# Patient Record
Sex: MALE | Race: WHITE | Employment: UNEMPLOYED | URBAN - NONMETROPOLITAN AREA
[De-identification: names, ages, dates, MRNs, and addresses within clinical notes are randomized per-mention and may not be internally consistent; named-entity substitution may affect disease eponyms.]

---

## 2017-01-19 ENCOUNTER — HOSPITAL ENCOUNTER (EMERGENCY)
Facility: HOSPITAL | Age: 42
Discharge: HOME | End: 2017-01-19
Attending: NURSE PRACTITIONER | Admitting: NURSE PRACTITIONER
Payer: COMMERCIAL

## 2017-01-19 VITALS
TEMPERATURE: 99.9 F | SYSTOLIC BLOOD PRESSURE: 141 MMHG | OXYGEN SATURATION: 96 % | DIASTOLIC BLOOD PRESSURE: 99 MMHG | RESPIRATION RATE: 18 BRPM

## 2017-01-19 DIAGNOSIS — J11.1 INFLUENZA-LIKE ILLNESS: ICD-10-CM

## 2017-01-19 LAB
FLUAV+FLUBV AG SPEC QL: NEGATIVE
FLUAV+FLUBV AG SPEC QL: NORMAL
SPECIMEN SOURCE: NORMAL

## 2017-01-19 PROCEDURE — 99213 OFFICE O/P EST LOW 20 MIN: CPT | Mod: 25

## 2017-01-19 PROCEDURE — 99213 OFFICE O/P EST LOW 20 MIN: CPT | Performed by: NURSE PRACTITIONER

## 2017-01-19 PROCEDURE — 87804 INFLUENZA ASSAY W/OPTIC: CPT | Performed by: FAMILY MEDICINE

## 2017-01-19 PROCEDURE — 71020 ZZHC CHEST TWO VIEWS, FRONT/LAT: CPT | Mod: TC

## 2017-01-19 RX ORDER — OSELTAMIVIR PHOSPHATE 75 MG/1
75 CAPSULE ORAL 2 TIMES DAILY
Qty: 10 CAPSULE | Refills: 0 | Status: SHIPPED | OUTPATIENT
Start: 2017-01-19 | End: 2017-01-24

## 2017-01-19 ASSESSMENT — ENCOUNTER SYMPTOMS
SORE THROAT: 1
DYSURIA: 0
FEVER: 1
SINUS PRESSURE: 1
ACTIVITY CHANGE: 0
DIZZINESS: 1
SHORTNESS OF BREATH: 1
VOMITING: 0
TROUBLE SWALLOWING: 0
CHILLS: 1
APPETITE CHANGE: 1
FATIGUE: 1
RHINORRHEA: 1
NAUSEA: 0
PSYCHIATRIC NEGATIVE: 1
COUGH: 1

## 2017-01-19 NOTE — ED AVS SNAPSHOT
[facility]    [address]    [place] [address]    Phone:  [phone]                                       Jay Jay Chatman   MRN: [id number]    Department:  [facility]   Date of Visit:  1/19/2017           Patient Information     Date Of Birth          1975        Your diagnoses for this visit were:     Influenza-like illness        You were seen by Jennifer Ortega NP.      Follow-up Information     Follow up with Leno Pathak MD.    Specialty:  Family Practice    Why:  As needed, If symptoms worsen    Contact information:    [facility]  [address]  [place] [address] [phone]          Follow up with [facility].    Specialty:  EMERGENCY MEDICINE    Why:  As needed, If symptoms worsen    Contact information:    [address]  [place] [address] [phone]    Additional information:    From [facility]: Take [address]. Turn left at [address]. Turn left at the first stoplight on [address]. At the first stop sign, take a right onto [address]. Take a left into the parking lot and continue through until you reach the North enterance of the building.       From [place]: Take [address]. Take the [address] ramp towards [place]. Turn left onto [address]. Take a slight right onto [address]. Turn left at the first stoplight on [address]. At the first stop sign, take a right onto [address]. Take a left into the parking lot and continue through until you reach the North enterance of the building.       From [place]: Take [address]. Take a right at [address]. At the first stop sign, take a right onto [address]. Take a left into the parking lot and continue through until you reach the North enterance of the building.         Discharge Instructions       As we discussed your symptoms are consistent with influenza.   Take Tamiflu as  directed.  Take tylenol and or ibuprofen for fever or discomfort.   Rest.   Increase fluid intake.   Good hand washing.   Work note.  Follow up with PCP with an increase in symptoms or concerns.   Return to urgent care or emergency department with any increase in symptoms or concerns.     Discharge References/Attachments     INFLUENZA  (ENGLISH)         Review of your medicines      START taking        Dose / Directions Last dose taken    oseltamivir 75 MG capsule   Commonly known as:  TAMIFLU   Dose:  75 mg   Quantity:  10 capsule        Take 1 capsule (75 mg) by mouth 2 times daily for 5 days   Refills:  0          Our records show that you are taking the medicines listed below. If these are incorrect, please call your family doctor or clinic.        Dose / Directions Last dose taken    amoxicillin 875 MG tablet   Commonly known as:  AMOXIL   Dose:  875 mg   Quantity:  20 tablet        Take 1 tablet (875 mg) by mouth 2 times daily   Refills:  0        mometasone 50 MCG/ACT spray   Commonly known as:  NASONEX   Quantity:  17 g        1 spray to each nostril BID for 5 days   Refills:  0        ZYRTEC PO   Dose:  1 tablet        Take 1 tablet by mouth daily as needed 10mg Tab   Refills:  0                Prescriptions were sent or printed at these locations (1 Prescription)                   [facility]  [address]   [address] [place] [address]    Telephone:  [phone]   Fax:  [phone]   Hours:                  E-Prescribed (1 of 1)         oseltamivir (TAMIFLU) 75 MG capsule                Procedures and tests performed during your visit     Chest XR,  PA & LAT    Influenza A/B antigen      Orders Needing Specimen Collection     None      Pending Results     Date and Time Order Name Status Description    1/19/2017 1941 Chest XR,  PA & LAT In process             Pending Culture Results     No orders found from 1/18/2017 to 1/20/2017.            Thank you for choosing  [facility]       Thank you for choosing [facility] for your care. Our goal is always to provide you with excellent care. Hearing back from our patients is one way we can continue to improve our services. Please take a few minutes to complete the written survey that you may receive in the mail after you visit with us. Thank you!        [facility] Information     [facility] gives you secure access to your electronic health record. If you see a primary care provider, you can also send messages to your care team and make appointments. If you have questions, please call your primary care clinic.  If you do not have a primary care provider, please call [phone] and they will assist you.        Care EveryWhere ID     This is your Care EveryWhere ID. This could be used by other organizations to access your [facility] medical records  [id number]        After Visit Summary       This is your record. Keep this with you and show to your community pharmacist(s) and doctor(s) at your next visit.

## 2017-01-19 NOTE — LETTER
[facility]  [address] [address]  Phone: [phone]    January 19, 2017        Jay Jay Chatman  [address] [place] [address]          To whom it may concern:    RE: Jay Jay Chatman    Patient was seen and treated today at our clinic.    Please excuse from work on 1- & 1- for illness. Able to return to work on 1-.      Sincerely,        ISAIAS Brweer  7:43 PM  January 19, 2017

## 2017-01-19 NOTE — ED AVS SNAPSHOT
[facility]    [address] [place] [address]    Phone:  [phone]                                       Jay Jay Chatman   MRN: [id number]    Department:  [facility]   Date of Visit:  1/19/2017           After Visit Summary Signature Page     I have received my discharge instructions, and my questions have been answered. I have discussed any challenges I see with this plan with the nurse or doctor.    ..........................................................................................................................................  Patient/Patient Representative Signature      ..........................................................................................................................................  Patient Representative Print Name and Relationship to Patient    ..................................................               ................................................  Date                                            Time    ..........................................................................................................................................  Reviewed by Signature/Title    ...................................................              ..............................................  Date                                                            Time

## 2017-01-20 NOTE — DISCHARGE INSTRUCTIONS
As we discussed your symptoms are consistent with influenza.   Take Tamiflu as directed.  Take tylenol and or ibuprofen for fever or discomfort.   Rest.   Increase fluid intake.   Good hand washing.   Work note.  Follow up with PCP with an increase in symptoms or concerns.   Return to urgent care or emergency department with any increase in symptoms or concerns.

## 2017-01-20 NOTE — ED PROVIDER NOTES
History     Chief Complaint   Patient presents with     Cough     Fever     The history is provided by the patient. No  was used.     Jay Jay Chatman is a 41 year old male who presents with a cough, chills, and fever that started yesterday. He has taken aleve with mild relief. Positive for fever, chills, or night sweats. Drinking well. Decreased appetite. Bowel and bladder are working well. He did not receive an influenza vaccine this year.     I have reviewed the Medications, Allergies, Past Medical and Surgical History, and Social History in the Epic system.    Review of Systems   Constitutional: Positive for fever, chills, appetite change and fatigue. Negative for activity change.   HENT: Positive for congestion, ear pain, postnasal drip, rhinorrhea, sinus pressure and sore throat. Negative for ear discharge and trouble swallowing.    Respiratory: Positive for cough and shortness of breath.         SOB with cough.    Gastrointestinal: Negative for nausea and vomiting.   Genitourinary: Negative for dysuria.   Skin: Negative for rash.   Neurological: Positive for dizziness.   Psychiatric/Behavioral: Negative.        Physical Exam   BP: 141/99 mmHg  Heart Rate: 100  Temp: 99.9  F (37.7  C)  Resp: 18  SpO2: 96 %  Physical Exam   Constitutional: He is oriented to person, place, and time. He appears well-developed and well-nourished. No distress.   HENT:   Right Ear: External ear normal.   Left Ear: External ear normal.   Mouth/Throat: Oropharynx is clear and moist. No oropharyngeal exudate.   Eyes: Conjunctivae and EOM are normal. Pupils are equal, round, and reactive to light. Right eye exhibits no discharge. Left eye exhibits no discharge.   Neck: Normal range of motion. Neck supple.   Cardiovascular: Normal rate, regular rhythm, normal heart sounds and intact distal pulses.    Pulmonary/Chest: Effort normal. No respiratory distress. He has no wheezes. He has no rales.   Abdominal: Soft. He  exhibits no distension.   Musculoskeletal: Normal range of motion.   Lymphadenopathy:     He has no cervical adenopathy.   Neurological: He is alert and oriented to person, place, and time. No cranial nerve deficit.   Skin: Skin is warm and dry. No rash noted. He is not diaphoretic.   Psychiatric: He has a normal mood and affect. His behavior is normal.   Nursing note and vitals reviewed.      ED Course   Procedures    Labs Ordered and Resulted from Time of ED Arrival Up to the Time of Departure from the ED   INFLUENZA A/B ANTIGEN     Results for orders placed or performed during the hospital encounter of 01/19/17   Chest XR,  PA & LAT    Narrative    CHEST TWO VIEWS    FINDINGS:  Lungs are clear.  Heart and pulmonary vessels are within  normal limits.    IMPRESSION:  NO EVIDENCE OF ACTIVE CARDIOPULMONARY DISEASE.  Exam Date: Jan 19, 2017 07:53:50 PM  Author: BRENDA SANDY  This report is final and signed     Influenza A/B antigen   Result Value Ref Range    Influenza A/B Agn Specimen Nares     Influenza A Negative NEG    Influenza B  NEG     Negative   Test results must be correlated with clinical data. If necessary, results   should be confirmed by a molecular assay or viral culture.         Assessments & Plan (with Medical Decision Making)     His symptoms are consistent with an influenza illness. Influenza swab was negative, but will treat with Tamiflu given symptoms. He verbalized understanding to plan of care. He will follow up with any increase in symptoms or concerns. He verbalized understanding.     I have reviewed the nursing notes.    I have reviewed the findings, diagnosis, plan and need for follow up with the patient.  Discharged in stable condition.     New Prescriptions    OSELTAMIVIR (TAMIFLU) 75 MG CAPSULE    Take 1 capsule (75 mg) by mouth 2 times daily for 5 days       Final diagnoses:   Influenza-like illness     As we discussed your symptoms are consistent with influenza.   Take Tamiflu as  directed.  Take tylenol and or ibuprofen for fever or discomfort.   Rest.   Increase fluid intake.   Good hand washing.   Work note.  Follow up with PCP with an increase in symptoms or concerns.   Return to urgent care or emergency department with any increase in symptoms or concerns.     ISAIAS Brewer  7:23 PM  January 19, 2017        Jennifer Ortega NP  01/22/17 1558

## 2020-02-13 ENCOUNTER — HOSPITAL ENCOUNTER (EMERGENCY)
Facility: HOSPITAL | Age: 45
Discharge: HOME | End: 2020-02-13
Attending: EMERGENCY MEDICINE | Admitting: EMERGENCY MEDICINE
Payer: COMMERCIAL

## 2020-02-13 ENCOUNTER — APPOINTMENT (OUTPATIENT)
Dept: GENERAL RADIOLOGY | Facility: HOSPITAL | Age: 45
End: 2020-02-13
Attending: EMERGENCY MEDICINE
Payer: COMMERCIAL

## 2020-02-13 VITALS
DIASTOLIC BLOOD PRESSURE: 100 MMHG | TEMPERATURE: 97.5 F | RESPIRATION RATE: 17 BRPM | SYSTOLIC BLOOD PRESSURE: 153 MMHG | OXYGEN SATURATION: 96 % | HEART RATE: 68 BPM

## 2020-02-13 DIAGNOSIS — R07.9 CHEST PAIN, UNSPECIFIED TYPE: ICD-10-CM

## 2020-02-13 LAB
ALBUMIN SERPL-MCNC: 3.9 G/DL (ref 3.4–5)
ALBUMIN UR-MCNC: NEGATIVE MG/DL
ALP SERPL-CCNC: 88 U/L (ref 40–150)
ALT SERPL W P-5'-P-CCNC: 49 U/L (ref 0–70)
ANION GAP SERPL CALCULATED.3IONS-SCNC: 7 MMOL/L (ref 3–14)
APPEARANCE UR: CLEAR
AST SERPL W P-5'-P-CCNC: 18 U/L (ref 0–45)
BASOPHILS # BLD AUTO: 0 10E9/L (ref 0–0.2)
BASOPHILS NFR BLD AUTO: 0.1 %
BILIRUB DIRECT SERPL-MCNC: <0.1 MG/DL (ref 0–0.2)
BILIRUB SERPL-MCNC: 0.4 MG/DL (ref 0.2–1.3)
BILIRUB UR QL STRIP: NEGATIVE
BUN SERPL-MCNC: 11 MG/DL (ref 7–30)
CALCIUM SERPL-MCNC: 8.8 MG/DL (ref 8.5–10.1)
CHLORIDE SERPL-SCNC: 104 MMOL/L (ref 94–109)
CO2 SERPL-SCNC: 27 MMOL/L (ref 20–32)
COLOR UR AUTO: NORMAL
CREAT SERPL-MCNC: 0.97 MG/DL (ref 0.66–1.25)
D DIMER PPP FEU-MCNC: <0.3 UG/ML FEU (ref 0–0.5)
DIFFERENTIAL METHOD BLD: NORMAL
EOSINOPHIL # BLD AUTO: 0.1 10E9/L (ref 0–0.7)
EOSINOPHIL NFR BLD AUTO: 1.4 %
ERYTHROCYTE [DISTWIDTH] IN BLOOD BY AUTOMATED COUNT: 12.5 % (ref 10–15)
GFR SERPL CREATININE-BSD FRML MDRD: >90 ML/MIN/{1.73_M2}
GLUCOSE SERPL-MCNC: 108 MG/DL (ref 70–99)
GLUCOSE UR STRIP-MCNC: NEGATIVE MG/DL
HCT VFR BLD AUTO: 47.1 % (ref 40–53)
HGB BLD-MCNC: 16 G/DL (ref 13.3–17.7)
HGB UR QL STRIP: NEGATIVE
IMM GRANULOCYTES # BLD: 0 10E9/L (ref 0–0.4)
IMM GRANULOCYTES NFR BLD: 0.3 %
KETONES UR STRIP-MCNC: NEGATIVE MG/DL
LACTATE BLD-SCNC: 1.3 MMOL/L (ref 0.7–2)
LEUKOCYTE ESTERASE UR QL STRIP: NEGATIVE
LIPASE SERPL-CCNC: 62 U/L (ref 73–393)
LYMPHOCYTES # BLD AUTO: 2.2 10E9/L (ref 0.8–5.3)
LYMPHOCYTES NFR BLD AUTO: 30.7 %
MCH RBC QN AUTO: 28.3 PG (ref 26.5–33)
MCHC RBC AUTO-ENTMCNC: 34 G/DL (ref 31.5–36.5)
MCV RBC AUTO: 83 FL (ref 78–100)
MONOCYTES # BLD AUTO: 0.5 10E9/L (ref 0–1.3)
MONOCYTES NFR BLD AUTO: 6.8 %
NEUTROPHILS # BLD AUTO: 4.4 10E9/L (ref 1.6–8.3)
NEUTROPHILS NFR BLD AUTO: 60.7 %
NITRATE UR QL: NEGATIVE
NRBC # BLD AUTO: 0 10*3/UL
NRBC BLD AUTO-RTO: 0 /100
PH UR STRIP: 6.5 PH (ref 4.7–8)
PLATELET # BLD AUTO: 227 10E9/L (ref 150–450)
POTASSIUM SERPL-SCNC: 3.4 MMOL/L (ref 3.4–5.3)
PROT SERPL-MCNC: 7.5 G/DL (ref 6.8–8.8)
RBC # BLD AUTO: 5.65 10E12/L (ref 4.4–5.9)
SODIUM SERPL-SCNC: 138 MMOL/L (ref 133–144)
SOURCE: NORMAL
SP GR UR STRIP: 1.02 (ref 1–1.03)
TROPONIN I SERPL-MCNC: <0.015 UG/L (ref 0–0.04)
UROBILINOGEN UR STRIP-MCNC: NORMAL MG/DL (ref 0–2)
WBC # BLD AUTO: 7.2 10E9/L (ref 4–11)

## 2020-02-13 PROCEDURE — 93010 ELECTROCARDIOGRAM REPORT: CPT | Performed by: INTERNAL MEDICINE

## 2020-02-13 PROCEDURE — 85379 FIBRIN DEGRADATION QUANT: CPT | Performed by: EMERGENCY MEDICINE

## 2020-02-13 PROCEDURE — 99285 EMERGENCY DEPT VISIT HI MDM: CPT | Mod: Z6 | Performed by: EMERGENCY MEDICINE

## 2020-02-13 PROCEDURE — 93005 ELECTROCARDIOGRAM TRACING: CPT

## 2020-02-13 PROCEDURE — 83605 ASSAY OF LACTIC ACID: CPT | Performed by: EMERGENCY MEDICINE

## 2020-02-13 PROCEDURE — 83690 ASSAY OF LIPASE: CPT | Performed by: EMERGENCY MEDICINE

## 2020-02-13 PROCEDURE — 71046 X-RAY EXAM CHEST 2 VIEWS: CPT | Mod: TC

## 2020-02-13 PROCEDURE — 84484 ASSAY OF TROPONIN QUANT: CPT | Performed by: EMERGENCY MEDICINE

## 2020-02-13 PROCEDURE — 36415 COLL VENOUS BLD VENIPUNCTURE: CPT | Performed by: EMERGENCY MEDICINE

## 2020-02-13 PROCEDURE — 81003 URINALYSIS AUTO W/O SCOPE: CPT | Performed by: EMERGENCY MEDICINE

## 2020-02-13 PROCEDURE — 85025 COMPLETE CBC W/AUTO DIFF WBC: CPT | Performed by: EMERGENCY MEDICINE

## 2020-02-13 PROCEDURE — 80048 BASIC METABOLIC PNL TOTAL CA: CPT | Performed by: EMERGENCY MEDICINE

## 2020-02-13 PROCEDURE — 80076 HEPATIC FUNCTION PANEL: CPT | Performed by: EMERGENCY MEDICINE

## 2020-02-13 PROCEDURE — 99285 EMERGENCY DEPT VISIT HI MDM: CPT | Mod: 25

## 2020-02-13 NOTE — DISCHARGE INSTRUCTIONS
Follow-up for stress test and ZIO Patch placement.  Thereafter follow-up with your primary doctor.  Return if any new or concerning symptoms.

## 2020-02-13 NOTE — ED AVS SNAPSHOT
[facility]  [address] [address]  Phone:  [phone]                                    Jay Jay Chatman   MRN: [id number]    Department:  [facility]   Date of Visit:  2/13/2020           After Visit Summary Signature Page    I have received my discharge instructions, and my questions have been answered. I have discussed any challenges I see with this plan with the nurse or doctor.    ..........................................................................................................................................  Patient/Patient Representative Signature      ..........................................................................................................................................  Patient Representative Print Name and Relationship to Patient    ..................................................               ................................................  Date                                   Time    ..........................................................................................................................................  Reviewed by Signature/Title    ...................................................              ..............................................  Date                                               Time          22EPIC Rev 08/18

## 2020-02-13 NOTE — ED PROVIDER NOTES
History     Chief Complaint   Patient presents with     Chest Pain     HPI  Jay Jay Chatman is a 44 year old male who presents with sharp pain in left chest, short lasting, seconds then gone, sometimes 2-3 at a time, occurred three times.  Sharp stabbing, not pressure.  No hx of similar.  No sob.  Mild nausea.  No vomiting.  No radiation.  No abd pain.  Stress test several years ago normal.  Pain at that time was different.  No abd pain.  No pain right now.  No caffeine or coffee.- only water and gatorade.  No medical problems otherwise.      Allergies:  No Known Allergies    Problem List:    Patient Active Problem List    Diagnosis Date Noted     ACP (advance care planning) 05/11/2016     Priority: Medium     Advance Care Planning 5/11/2016: ACP Review of Chart / Resources Provided:  Reviewed chart for advance care plan.  Jay Jay Chatman has no plan or code status on file. Discussed available resources and provided with information. Confirmed code status reflects current choices pending further ACP discussions.  Confirmed/documented legally designated decision makers.  Added by Ivone Jim             Low back pain, left L4-L5 radiculopathy 02/24/2016     Priority: Medium     Seasonal allergic rhinitis 08/25/2015     Priority: Medium        Past Medical History:    Past Medical History:   Diagnosis Date     Acute sinusitis, unspecified 10/09/2006       Past Surgical History:    Past Surgical History:   Procedure Laterality Date     ADENOIDECTOMY  04/2006     FESS  02/2010     TONSILLECTOMY  04/2006     ventilation tubes, bilateral x 2  04/2006       Family History:    Family History   Problem Relation Age of Onset     Diabetes Mother      Cerebrovascular Disease Mother      Heart Disease Mother         stents     Diabetes Father      Other - See Comments Brother 4        myocardial infarction     Heart Disease Brother         MI     Other - See Comments Sister         kidney problems; status post nephrectomy        Social History:  Marital Status:   [2]  Social History     Tobacco Use     Smoking status: Never Smoker     Smokeless tobacco: Never Used     Tobacco comment: no passive exposure   Substance Use Topics     Alcohol use: No     Drug use: No        Medications:    Cetirizine HCl (ZYRTEC PO)          Review of Systems   resp denies.  CV per hpi.  Gi nausea.   denies.  Eyes denies.  Remainder of complete 10 point review of systems negative.        Physical Exam   BP: (!) 172/110  Heart Rate: 83  Temp: 97.5  F (36.4  C)  Resp: 14  SpO2: 97 %      Physical Exam  Constitutional:       Appearance: He is well-developed.   HENT:      Head: Normocephalic and atraumatic.   Eyes:      Extraocular Movements: Extraocular movements intact.      Pupils: Pupils are equal, round, and reactive to light.   Neck:      Musculoskeletal: Normal range of motion.   Cardiovascular:      Rate and Rhythm: Normal rate.      Heart sounds: Normal heart sounds.   Pulmonary:      Effort: Pulmonary effort is normal. No tachypnea or respiratory distress.      Breath sounds: Normal breath sounds.   Chest:      Chest wall: No mass, tenderness or edema.   Abdominal:      Palpations: Abdomen is soft.      Comments: Nontender throughout.  Reexamination of the abdomen benign.   Musculoskeletal: Normal range of motion.   Skin:     General: Skin is warm and dry.   Neurological:      General: No focal deficit present.      Mental Status: He is alert and oriented to person, place, and time.   Psychiatric:         Mood and Affect: Mood normal.         Behavior: Behavior normal.       EKG read in real-time by the emergency physician timed 1252 shows no evidence for acute ST-T wave changes.  Rate 80, [place] interval 168, , .       Results for orders placed or performed during the hospital encounter of 02/13/20 (from the past 24 hour(s))   D dimer quantitative   Result Value Ref Range    D Dimer <0.3 0.0 - 0.50 ug/ml FEU   CBC with platelets  differential   Result Value Ref Range    WBC 7.2 4.0 - 11.0 10e9/L    RBC Count 5.65 4.4 - 5.9 10e12/L    Hemoglobin 16.0 13.3 - 17.7 g/dL    Hematocrit 47.1 40.0 - 53.0 %    MCV 83 78 - 100 fl    MCH 28.3 26.5 - 33.0 pg    MCHC 34.0 31.5 - 36.5 g/dL    RDW 12.5 10.0 - 15.0 %    Platelet Count 227 150 - 450 10e9/L    Diff Method Automated Method     % Neutrophils 60.7 %    % Lymphocytes 30.7 %    % Monocytes 6.8 %    % Eosinophils 1.4 %    % Basophils 0.1 %    % Immature Granulocytes 0.3 %    Nucleated RBCs 0 0 /100    Absolute Neutrophil 4.4 1.6 - 8.3 10e9/L    Absolute Lymphocytes 2.2 0.8 - 5.3 10e9/L    Absolute Monocytes 0.5 0.0 - 1.3 10e9/L    Absolute Eosinophils 0.1 0.0 - 0.7 10e9/L    Absolute Basophils 0.0 0.0 - 0.2 10e9/L    Abs Immature Granulocytes 0.0 0 - 0.4 10e9/L    Absolute Nucleated RBC 0.0    Basic metabolic panel   Result Value Ref Range    Sodium 138 133 - 144 mmol/L    Potassium 3.4 3.4 - 5.3 mmol/L    Chloride 104 94 - 109 mmol/L    Carbon Dioxide 27 20 - 32 mmol/L    Anion Gap 7 3 - 14 mmol/L    Glucose 108 (H) 70 - 99 mg/dL    Urea Nitrogen 11 7 - 30 mg/dL    Creatinine 0.97 0.66 - 1.25 mg/dL    GFR Estimate >90 >60 mL/min/[1.73_m2]    GFR Estimate If Black >90 >60 mL/min/[1.73_m2]    Calcium 8.8 8.5 - 10.1 mg/dL   Lipase   Result Value Ref Range    Lipase 62 (L) 73 - 393 U/L   Troponin I   Result Value Ref Range    Troponin I ES <0.015 0.000 - 0.045 ug/L   Lactic acid whole blood   Result Value Ref Range    Lactic Acid 1.3 0.7 - 2.0 mmol/L   Hepatic panel   Result Value Ref Range    Bilirubin Direct <0.1 0.0 - 0.2 mg/dL    Bilirubin Total 0.4 0.2 - 1.3 mg/dL    Albumin 3.9 3.4 - 5.0 g/dL    Protein Total 7.5 6.8 - 8.8 g/dL    Alkaline Phosphatase 88 40 - 150 U/L    ALT 49 0 - 70 U/L    AST 18 0 - 45 U/L   Chest XR,  PA & LAT    Narrative    PROCEDURE: XR CHEST 2 VW 2/13/2020 1:53 PM    HISTORY: cp    COMPARISONS: 1/19/2017.    TECHNIQUE: 2 views.    FINDINGS: Heart and pulmonary vasculature  are normal. No acute  infiltrate or effusion is seen.         Impression    IMPRESSION: No acute disease.    DION MURPYH MD   UA reflex to Microscopic and Culture   Result Value Ref Range    Color Urine Light Yellow     Appearance Urine Clear     Glucose Urine Negative NEG^Negative mg/dL    Bilirubin Urine Negative NEG^Negative    Ketones Urine Negative NEG^Negative mg/dL    Specific Gravity Urine 1.021 1.003 - 1.035    Blood Urine Negative NEG^Negative    pH Urine 6.5 4.7 - 8.0 pH    Protein Albumin Urine Negative NEG^Negative mg/dL    Urobilinogen mg/dL Normal 0.0 - 2.0 mg/dL    Nitrite Urine Negative NEG^Negative    Leukocyte Esterase Urine Negative NEG^Negative    Source Unspecified Urine        Medications - No data to display    Assessments & Plan (with Medical Decision Making)   44-year-old male presenting with chest pain lasting seconds that is stabbing and sharp.  EKG nondiagnostic.  Troponin negative.  Remainder of laboratories and chest x-ray nonrevealing.  Reexamination reassuring.  Differential diagnosis diagnosis by history includes PVCs prominently however patient without recurrence of symptoms here in the ED. patient notes no recurrence of symptoms.  He is pain-free.  Again symptoms lasted seconds.  At this juncture, I do believe the best course of action is to proceed to Zio patch on outpatient basis and stress test with plan for the patient to return if any new or concerning symptoms.  The patient agrees with above plan.  Additionally by review of systems at discharge, the patient notes he does have some abdominal pain and his laboratories were nonrevealing and abdominal examination is benign.  I  advised him to return if any recurrent abdominal pain is no pain at this juncture and no indication for CT in light of benign exam.     New Prescriptions    No medications on file       Final diagnoses:   Chest pain, unspecified type       2/13/2020   [facility]     Jairo George  MD [place]  02/13/20 [id number]

## 2020-02-13 NOTE — ED TRIAGE NOTES
States earlier in the day at work he had some sharp stabbing pains that have come and gone a handful of times today he points to the left side of his chest as to where he's having them. Patient states he was seen for chest pain previously but nothing was found, however today he states this pain is different. States the pain was at a 8 out of 10 when having these sharp spasm type pains. Patient states he thinks he may have some anxiety but never been diagnosed with it. Does endorse some nausea this morning but states he had a little yesterday thought maybe he was getting sick. Denies any actual emesis though.

## 2020-02-19 ENCOUNTER — HOSPITAL ENCOUNTER (OUTPATIENT)
Dept: CARDIOLOGY | Facility: HOSPITAL | Age: 45
Discharge: HOME | End: 2020-02-19
Attending: EMERGENCY MEDICINE | Admitting: EMERGENCY MEDICINE
Payer: COMMERCIAL

## 2020-02-19 ENCOUNTER — HOSPITAL ENCOUNTER (OUTPATIENT)
Dept: NUCLEAR MEDICINE | Facility: HOSPITAL | Age: 45
Setting detail: NUCLEAR MEDICINE
End: 2020-02-19
Attending: EMERGENCY MEDICINE
Payer: COMMERCIAL

## 2020-02-19 ENCOUNTER — HOSPITAL ENCOUNTER (OUTPATIENT)
Dept: NUCLEAR MEDICINE | Facility: HOSPITAL | Age: 45
Setting detail: NUCLEAR MEDICINE
Discharge: HOME | End: 2020-02-19
Attending: EMERGENCY MEDICINE | Admitting: EMERGENCY MEDICINE
Payer: COMMERCIAL

## 2020-02-19 ENCOUNTER — HOSPITAL ENCOUNTER (OUTPATIENT)
Dept: CARDIOLOGY | Facility: HOSPITAL | Age: 45
Setting detail: NUCLEAR MEDICINE
End: 2020-02-19
Attending: EMERGENCY MEDICINE
Payer: COMMERCIAL

## 2020-02-19 DIAGNOSIS — R07.9 CHEST PAIN, UNSPECIFIED TYPE: ICD-10-CM

## 2020-02-19 LAB
CV BLOOD PRESSURE: 72 %
CV STRESS MAX HR HE: 157
NUC STRESS EJECTION FRACTION: 68 %
RATE PRESSURE PRODUCT: NORMAL
STRESS ECHO BASELINE DIASTOLIC HE: 102
STRESS ECHO BASELINE HR: 66
STRESS ECHO BASELINE SYSTOLIC BP: 166
STRESS ECHO CALCULATED PERCENT HR: 90 %
STRESS ECHO LAST STRESS DIASTOLIC BP: 80
STRESS ECHO LAST STRESS SYSTOLIC BP: 200
STRESS ECHO POST ESTIMATED WORKLOAD: 10.1 METS
STRESS ECHO POST EXERCISE DUR MIN: 8 MIN
STRESS ECHO POST EXERCISE DUR SEC: 0 SEC
STRESS ECHO TARGET HR: 174

## 2020-02-19 PROCEDURE — 25800030 ZZH RX IP 258 OP 636: Performed by: INTERNAL MEDICINE

## 2020-02-19 PROCEDURE — 93017 CV STRESS TEST TRACING ONLY: CPT | Performed by: INTERNAL MEDICINE

## 2020-02-19 PROCEDURE — 93018 CV STRESS TEST I&R ONLY: CPT | Performed by: INTERNAL MEDICINE

## 2020-02-19 PROCEDURE — 93016 CV STRESS TEST SUPVJ ONLY: CPT | Performed by: INTERNAL MEDICINE

## 2020-02-19 PROCEDURE — A9500 TC99M SESTAMIBI: HCPCS | Performed by: RADIOLOGY

## 2020-02-19 PROCEDURE — 34300033 ZZH RX 343: Performed by: RADIOLOGY

## 2020-02-19 PROCEDURE — 0298T ZIO PATCH HOLTER ADULT PEDIATRIC GREATER THAN 48 HRS: CPT | Performed by: INTERNAL MEDICINE

## 2020-02-19 PROCEDURE — 78452 HT MUSCLE IMAGE SPECT MULT: CPT | Mod: TC

## 2020-02-19 PROCEDURE — 0296T ZIO PATCH HOLTER ADULT PEDIATRIC GREATER THAN 48 HRS: CPT | Mod: TC

## 2020-02-19 RX ORDER — SODIUM CHLORIDE 9 MG/ML
INJECTION, SOLUTION INTRAVENOUS ONCE
Status: COMPLETED | OUTPATIENT
Start: 2020-02-19 | End: 2020-02-19

## 2020-02-19 RX ADMIN — Medication 32.2 MILLICURIE: at 09:03

## 2020-02-19 RX ADMIN — SODIUM CHLORIDE: 9 INJECTION, SOLUTION INTRAVENOUS at 08:51

## 2020-02-19 RX ADMIN — Medication 10.8 MILLICURIE: at 06:58

## 2020-02-20 NOTE — PROGRESS NOTES
Subjective     Jay Jay Chatman is a 44 year old male who presents to clinic today for the following health issues:    HPI   Chest Pain      Onset: 2/13/2020 presented to the ER. Here for stress test results. Has Zio patch on for 2 weeks    Description (location/character/radiation/duration): mid chest pain    Intensity:  mild    Accompanying signs and symptoms:        Shortness of breath: no        Sweating: no        Nausea/vomitting: no        Palpitations: no        Other (fevers/chills/cough/heartburn/lightheadedness): no     History (similar episodes/previous evaluation): None    Precipitating or alleviating factors:       Worse with exertion: no        Worse with breathing: no        Related to eating: no        Better with burping: no     Therapies tried and outcome: None    Snoring/fatigue  Jay Jay has a history of excessive snoring as well as daytime sleepiness.  He has not had a previous sleep study.  He has had chest pain as well as elevated blood pressures.       Patient Active Problem List   Diagnosis     Seasonal allergic rhinitis     Low back pain, left L4-L5 radiculopathy     ACP (advance care planning)     Past Surgical History:   Procedure Laterality Date     ADENOIDECTOMY  04/2006     FESS  02/2010     TONSILLECTOMY  04/2006     ventilation tubes, bilateral x 2  04/2006       Social History     Tobacco Use     Smoking status: Never Smoker     Smokeless tobacco: Never Used     Tobacco comment: no passive exposure   Substance Use Topics     Alcohol use: No     Family History   Problem Relation Age of Onset     Diabetes Mother      Cerebrovascular Disease Mother      Heart Disease Mother         stents     Diabetes Father      Other - See Comments Brother 4        myocardial infarction     Heart Disease Brother         MI     Other - See Comments Sister         kidney problems; status post nephrectomy         Current Outpatient Medications   Medication Sig Dispense Refill     Cetirizine HCl (ZYRTEC PO)  Take 1 tablet by mouth daily as needed 10mg Tab       No Known Allergies  Recent Labs   Lab Test 02/13/20  1259 11/04/13  0415   A1C  --  5.1   LDL  --  147*   HDL  --  40   TRIG  --  102   ALT 49 64   CR 0.97 1.00   GFRESTIMATED >90 83   GFRESTBLACK >90 >90   POTASSIUM 3.4 3.7      BP Readings from Last 3 Encounters:   02/13/20 153/100   01/19/17 141/99   12/14/16 (!) 149/105    Wt Readings from Last 3 Encounters:   12/14/16 79.4 kg (175 lb)   05/11/16 80.7 kg (178 lb)   03/02/16 81.6 kg (180 lb)            Reviewed and updated as needed this visit by Provider         Review of Systems   ROS COMP: Constitutional, HEENT, cardiovascular, pulmonary, gi and gu systems are negative, except as otherwise noted.      Objective    There were no vitals taken for this visit.  There is no height or weight on file to calculate BMI.  Physical Exam  Vitals signs and nursing note reviewed.   Constitutional:       General: He is not in acute distress.     Appearance: He is well-developed.   HENT:      Head: Normocephalic and atraumatic.      Right Ear: External ear normal.      Left Ear: External ear normal.      Nose: Nose normal.   Neck:      Musculoskeletal: Neck supple.   Cardiovascular:      Rate and Rhythm: Normal rate and regular rhythm.      Heart sounds: Normal heart sounds. No murmur. No friction rub. No gallop.    Pulmonary:      Effort: Pulmonary effort is normal.      Breath sounds: Normal breath sounds.   Skin:     General: Skin is warm and dry.   Neurological:      Mental Status: He is alert and oriented to person, place, and time.   Psychiatric:         Mood and Affect: Mood normal.         Behavior: Behavior normal.         Thought Content: Thought content normal.          Diagnostic Test Results:  Labs reviewed in Epic        Assessment & Plan     1. Atypical chest pain  Reviewed NM stress test and ER evaluation.  He currently has a Ziopatch on.  Discussed potential etiologies. Appointment with Cardiology  scheduled for evaluation and recommendations for further management.   - CARDIOLOGY EVAL ADULT REFERRAL    2. Snoring  Discussed potential obstructive sleep apnea.  Appointment with Sleep Medicine scheduled for evaluation and recommendations for further management.   - SLEEP EVALUATION & MANAGEMENT REFERRAL - ADULT -[facility] - [place] [phone] (Age 5 and up); Future       See Patient Instructions    No follow-ups on file.    Leno Pathak MD  [facility] [place]

## 2020-02-21 ENCOUNTER — OFFICE VISIT (OUTPATIENT)
Dept: FAMILY MEDICINE | Facility: OTHER | Age: 45
End: 2020-02-21
Attending: FAMILY MEDICINE
Payer: COMMERCIAL

## 2020-02-21 VITALS
HEIGHT: 65 IN | HEART RATE: 75 BPM | WEIGHT: 193 LBS | SYSTOLIC BLOOD PRESSURE: 148 MMHG | OXYGEN SATURATION: 97 % | DIASTOLIC BLOOD PRESSURE: 88 MMHG | RESPIRATION RATE: 20 BRPM | BODY MASS INDEX: 32.15 KG/M2

## 2020-02-21 DIAGNOSIS — R07.89 ATYPICAL CHEST PAIN: ICD-10-CM

## 2020-02-21 DIAGNOSIS — R06.83 SNORING: Primary | ICD-10-CM

## 2020-02-21 PROCEDURE — 99203 OFFICE O/P NEW LOW 30 MIN: CPT | Performed by: FAMILY MEDICINE

## 2020-02-21 ASSESSMENT — MIFFLIN-ST. JEOR: SCORE: 1682.32

## 2020-02-21 ASSESSMENT — PAIN SCALES - GENERAL: PAINLEVEL: NO PAIN (0)

## 2020-02-21 NOTE — NURSING NOTE
"Chief Complaint   Patient presents with     Results       Initial BP (!) 148/88   Pulse 75   Resp 20   Ht 1.651 m (5' 5\")   Wt 87.5 kg (193 lb)   SpO2 97%   BMI 32.12 kg/m   Estimated body mass index is 32.12 kg/m  as calculated from the following:    Height as of this encounter: 1.651 m (5' 5\").    Weight as of this encounter: 87.5 kg (193 lb).  Medication Reconciliation: complete  Jacinta Jurado LPN    "

## 2020-03-02 ENCOUNTER — HEALTH MAINTENANCE LETTER (OUTPATIENT)
Age: 45
End: 2020-03-02

## 2020-03-09 ENCOUNTER — TELEPHONE (OUTPATIENT)
Dept: CARDIOLOGY | Facility: OTHER | Age: 45
End: 2020-03-09

## 2020-03-09 DIAGNOSIS — R07.9 CHEST PAIN, UNSPECIFIED TYPE: Primary | ICD-10-CM

## 2020-03-10 NOTE — PROGRESS NOTES
[facility]   CARDIOLOGY CONSULT     Jay Jay Chatman   1975  [id number]    Leno Pathak     Chief Complaint   Patient presents with     Consult     Atypical chest pain, Dr Pathak referring           HPI:   Mr. Chatman is a 44-year-old gentleman who is being seen for atypical chest pain and uncontrolled hypertension.  He also has a history of presumptive NAVIN and left axis deviation.    For the last year or so, he has been having noncardiac chest pain.  He describes sharp pain to his left precordium without radiation.  He describes a quick jab lasting only seconds.  May be affected by emotional situations but he is not able to clearly identify a triggering factor.  It does not appear to be affected by activity, food, position, and is not reproducible.    He was seen in the ER on 2/13/2020 as he had 3-4 episodes in an hour which is the most he has ever had.  He had somewhat of an extensive work-up with essentially negative EKG and troponin.  Labs and chest x-ray normal.  He was set up with a outpatient ZIO patch which has not been completed and stress test.    Stress test on 2/19/2020 was deficient of reversible ischemia with an EF of 72%.    He has a presumptive history of sleep apnea with uncontrolled hypertension.  We discussed starting a hydrochlorothiazide 25 mg on 3/11/2020 which he was agreeable to.    IMAGING RESULTS:   Stress test on 2/19/2020:  The nuclear stress test is negative for inducible myocardial ischemia or infarction.     Left ventricular function is normal.     The left ventricular ejection fraction at rest is 72%.  The left ventricular ejection fraction at stress is 68%.     A prior study was conducted on 11/12/2013.  This study has no change when compared with the prior study.    ALLERGIES:   No Known Allergies     PAST MEDICAL HISTORY:   Past Medical History:   Diagnosis Date     Acute sinusitis, unspecified 10/09/2006        PAST SURGICAL HISTORY:   Past Surgical History:    Procedure Laterality Date     ADENOIDECTOMY  04/2006     FESS  02/2010     TONSILLECTOMY  04/2006     ventilation tubes, bilateral x 2  04/2006        FAMILY HISTORY:   Family History   Problem Relation Age of Onset     Diabetes Mother      Cerebrovascular Disease Mother      Heart Disease Mother         stents     Diabetes Father      Other - See Comments Brother 4        myocardial infarction     Heart Disease Brother         MI     Other - See Comments Sister         kidney problems; status post nephrectomy        SOCIAL HISTORY:   Social History     Socioeconomic History     Marital status:      Spouse name: Not on file     Number of children: Not on file     Years of education: Not on file     Highest education level: Not on file   Occupational History     Occupation: foundary worker   Social Needs     Financial resource strain: Not on file     Food insecurity     Worry: Not on file     Inability: Not on file     Transportation needs     Medical: Not on file     Non-medical: Not on file   Tobacco Use     Smoking status: Never Smoker     Smokeless tobacco: Never Used     Tobacco comment: no passive exposure   Substance and Sexual Activity     Alcohol use: No     Drug use: No     Sexual activity: Yes     Partners: Female   Lifestyle     Physical activity     Days per week: Not on file     Minutes per session: Not on file     Stress: Not on file   Relationships     Social connections     Talks on phone: Not on file     Gets together: Not on file     Attends Taoism service: Not on file     Active member of club or organization: Not on file     Attends meetings of clubs or organizations: Not on file     Relationship status: Not on file     Intimate partner violence     Fear of current or ex partner: Not on file     Emotionally abused: Not on file     Physically abused: Not on file     Forced sexual activity: Not on file   Other Topics Concern      Service No     Blood Transfusions Yes      Comment: Permits if needed     Caffeine Concern Yes     Comment: 2 cups soda daily     Occupational Exposure No     Hobby Hazards No     Sleep Concern No     Stress Concern No     Weight Concern No     Special Diet No     Back Care No     Exercise No     Bike Helmet Not Asked     Seat Belt Yes     Self-Exams Not Asked     Parent/sibling w/ CABG, MI or angioplasty before 65F 55M? Yes     Comment: Brother   Social History Narrative     Not on file         CURRENT MEDICATIONS:   Prior to Admission medications    Medication Sig Start Date End Date Taking? Authorizing Provider   Cetirizine HCl (ZYRTEC PO) Take 1 tablet by mouth daily as needed 10mg Tab    Reported, Patient          ROS:   CONSTITUTIONAL: No weight loss, fever, chills, weakness or fatigue.   HEENT: Eyes: No visual changes. Ears, Nose, Throat: No hearing loss, congestion or difficulty swallowing.   CARDIOVASCULAR: (+) chest pain and chest discomfort but no chest pressure. (+) palpitations or lower extremity edema.   RESPIRATORY: No shortness of breath, dyspnea upon exertion, cough or sputum production.   GASTROINTESTINAL: No abdominal pain. No anorexia, nausea, vomiting or diarrhea.   NEUROLOGICAL: No headache, lightheadedness, dizziness, syncope, ataxia or weakness.   HEMATOLOGIC: No anemia, bleeding or bruising.   PSYCHIATRIC: No history of depression or anxiety.   ENDOCRINOLOGIC: No reports of sweating, cold or heat intolerance. No polyuria or polydipsia.   SKIN: No abnormal rashes or itching.       PHYSICAL EXAM:   GENERAL: The patient is a well-developed, well-nourished, in no apparent distress. Alert and oriented x3.   HEENT: Head is normocephalic and atraumatic. Eyes are symmetrical with normal visual tracking.  HEART: Regular rate and rhythm, S1S2 present without murmur, rub or gallop.   LUNGS: Respirations regular and unlabored. Clear to auscultation.   GI: Abdomen is soft and nondistended.   EXTREMITIES: No peripheral edema present.    MUSCULOSKELETAL: No joint swelling.   NEUROLOGIC: Alert and oriented X3.    SKIN: No jaundice. No rashes or visible skin lesions present.       LAB RESULTS:   No visits with results within 2 Month(s) from this visit.   Latest known visit with results is:   Office Visit on 05/11/2016   Component Date Value Ref Range Status     WBC 05/11/2016 7.2  4.0 - 11.0 10e9/L Final     RBC Count 05/11/2016 5.38  4.4 - 5.9 10e12/L Final     Hemoglobin 05/11/2016 15.6  13.3 - 17.7 g/dL Final     Hematocrit 05/11/2016 45.5  40.0 - 53.0 % Final     MCV 05/11/2016 85  78 - 100 fl Final     MCH 05/11/2016 29.0  26.5 - 33.0 pg Final     MCHC 05/11/2016 34.3  31.5 - 36.5 g/dL Final     RDW 05/11/2016 13.4  10.0 - 15.0 % Final     Platelet Count 05/11/2016 248  150 - 450 10e9/L Final     Diff Method 05/11/2016 Automated Method   Final     % Neutrophils 05/11/2016 55.2  % Final     % Lymphocytes 05/11/2016 35.7  % Final     % Monocytes 05/11/2016 6.5  % Final     % Eosinophils 05/11/2016 1.9  % Final     % Basophils 05/11/2016 0.4  % Final     % Immature Granulocytes 05/11/2016 0.3  % Final     Nucleated RBCs 05/11/2016 0  0 /100 Final     Absolute Neutrophil 05/11/2016 4.0  1.6 - 8.3 10e9/L Final     Absolute Lymphocytes 05/11/2016 2.6  0.8 - 5.3 10e9/L Final     Absolute Monocytes 05/11/2016 0.5  0.0 - 1.3 10e9/L Final     Absolute Eosinophils 05/11/2016 0.1  0.0 - 0.7 10e9/L Final     Absolute Basophils 05/11/2016 0.0  0.0 - 0.2 10e9/L Final     Abs Immature Granulocytes 05/11/2016 0.0  0 - 0.4 10e9/L Final     Absolute Nucleated RBC 05/11/2016 0.0   Final            ASSESSMENT:       ICD-10-CM    1. Other chest pain  R07.89    2. Benign essential hypertension  I10 hydrochlorothiazide (HYDRODIURIL) 25 MG tablet   3. NAVIN (obstructive sleep apnea)  G47.33    4. Left axis deviation  R94.31          PLAN:   1.  We had a long discussion about his symptoms.  His symptoms are clearly noncardiac.  They are not affected by activity.  He  describes them as sharp lasting only seconds.  He thinks there may be an emotional contribution to his symptoms.  He had a stress test on 2/19/2020 which was negative.  ZIO Patch is pending.  It is possible he could be feeling palpitations but seems less likely as the cause of his symptoms.  The conversation was reassuring and no additional testing was recommended.  2.  Secondary to uncontrolled hypertension, will start on hydrochlorothiazide 25 mg daily.  3.  He is to keep his appointment to be assessed for NAVIN.  4.  He is to check his blood pressure at home and maintain a log.  He is to present these at his next visit.  5.  Follow-up in the future on as-needed basis.      Thank you for allowing me to participate in the care of your patient. Please do not hesitate to contact me if you have any questions.     Cehma Tovar, DO

## 2020-03-11 ENCOUNTER — OFFICE VISIT (OUTPATIENT)
Dept: CARDIOLOGY | Facility: OTHER | Age: 45
End: 2020-03-11
Attending: INTERNAL MEDICINE
Payer: COMMERCIAL

## 2020-03-11 ENCOUNTER — APPOINTMENT (OUTPATIENT)
Dept: GENERAL RADIOLOGY | Facility: OTHER | Age: 45
End: 2020-03-11
Attending: INTERNAL MEDICINE
Payer: COMMERCIAL

## 2020-03-11 VITALS
BODY MASS INDEX: 32.15 KG/M2 | HEART RATE: 66 BPM | WEIGHT: 193 LBS | HEIGHT: 65 IN | SYSTOLIC BLOOD PRESSURE: 186 MMHG | TEMPERATURE: 97.8 F | DIASTOLIC BLOOD PRESSURE: 115 MMHG | OXYGEN SATURATION: 98 %

## 2020-03-11 DIAGNOSIS — G47.33 OSA (OBSTRUCTIVE SLEEP APNEA): ICD-10-CM

## 2020-03-11 DIAGNOSIS — R07.9 CHEST PAIN, UNSPECIFIED TYPE: ICD-10-CM

## 2020-03-11 DIAGNOSIS — R94.31 LEFT AXIS DEVIATION: ICD-10-CM

## 2020-03-11 DIAGNOSIS — R07.89 OTHER CHEST PAIN: Primary | ICD-10-CM

## 2020-03-11 DIAGNOSIS — I10 BENIGN ESSENTIAL HYPERTENSION: ICD-10-CM

## 2020-03-11 PROCEDURE — 99204 OFFICE O/P NEW MOD 45 MIN: CPT | Performed by: INTERNAL MEDICINE

## 2020-03-11 PROCEDURE — 93000 ELECTROCARDIOGRAM COMPLETE: CPT | Performed by: INTERNAL MEDICINE

## 2020-03-11 RX ORDER — HYDROCHLOROTHIAZIDE 25 MG/1
25 TABLET ORAL DAILY
Qty: 90 TABLET | Refills: 3 | Status: SHIPPED | OUTPATIENT
Start: 2020-03-11 | End: 2021-03-15

## 2020-03-11 ASSESSMENT — MIFFLIN-ST. JEOR: SCORE: 1682.32

## 2020-03-11 ASSESSMENT — PAIN SCALES - GENERAL: PAINLEVEL: NO PAIN (0)

## 2020-03-11 NOTE — NURSING NOTE
"Chief Complaint   Patient presents with     Consult     Atypical chest pain, Dr Pathak referring        Initial BP (!) 170/107 (BP Location: Left arm, Patient Position: Sitting, Cuff Size: Adult Regular)   Pulse 67   Temp 97.8  F (36.6  C) (Tympanic)   Ht 1.651 m (5' 5\")   Wt 87.5 kg (193 lb)   SpO2 98%   BMI 32.12 kg/m   Estimated body mass index is 32.12 kg/m  as calculated from the following:    Height as of this encounter: 1.651 m (5' 5\").    Weight as of this encounter: 87.5 kg (193 lb).  Medication Reconciliation: complete     Ivone Campbell LPN    "

## 2020-03-11 NOTE — PATIENT INSTRUCTIONS
You were seen by Dr. Tovar, 03/11/20.     1.  Please begin taking hydrochlorothiazide 25 mg daily.      2. Please continue all other medications as they have been prescribed.     3. If you develop new or worsening symptoms, please call the cardiology office as you may need to be evaluated.    4. Continue to check your blood pressures at home.     5. Follow through with your sleep study.     You will follow up with Dr. Tovar as needed.       Please call the cardiology office with problems, questions, or concerns at [phone].    If you experience chest pain, chest pressure, chest tightness, shortness of breath, fainting, lightheadedness, nausea, vomiting, or other concerning symptoms, please report to the Emergency Department or call 911. These symptoms may be emergent, and best treated in the Emergency Department.       Sanjiv MATIAS RN  Cardiology   [facility]  [phone]

## 2021-03-15 DIAGNOSIS — I10 BENIGN ESSENTIAL HYPERTENSION: ICD-10-CM

## 2021-03-15 RX ORDER — HYDROCHLOROTHIAZIDE 25 MG/1
TABLET ORAL
Qty: 30 TABLET | Refills: 0 | Status: SHIPPED | OUTPATIENT
Start: 2021-03-15 | End: 2021-03-31

## 2021-03-15 NOTE — TELEPHONE ENCOUNTER
Hydrochlorothiazide      Last Written Prescription Date:  03/11/2020  Last Fill Quantity: 90,   # refills: 3  Last Office Visit: 02/21/2020  Future Office visit:

## 2021-03-29 NOTE — PROGRESS NOTES
Assessment & Plan     Essential hypertension  Goals reviewed.  Continue home BP monitoring. Increase hydrochlorothiazide to 50 mg daily. Follow up telehealth one month.   - CBC with platelets differential  - Comprehensive metabolic panel  - Lipid Profile  - TSH with free T4 reflex  - UA with Microscopic reflex to Culture  - hydrochlorothiazide (HYDRODIURIL) 50 MG tablet; Take 1 tablet (50 mg) by mouth daily    Snoring  Discussed sleep study. Referral made.  - SLEEP EVALUATION & MANAGEMENT REFERRAL - ADULT -[facility] - [place] [phone] (Age 5 and up); Future  [id number]}     See Patient Instructions    No follow-ups on file.    Leno Pathak MD  [facility] - [place]    Subjective   Jay Jay is a 45 year old who presents for the following health issues    HPI     Hypertension Follow-up      Do you check your blood pressure regularly outside of the clinic? No     Are you following a low salt diet? No    Are your blood pressures ever more than 140 on the top number (systolic) OR more   than 90 on the bottom number (diastolic), for example 140/90? Yes    Jay Jay is seen in follow up of hypertension.  He has not been checking his home blood pressure. It has been treated with hydrochlorothiazide.  His previous evaluation for atypical chest pain was negative for cardiac causes.  He had Cardiology consultation.      Snoring  Jay Jay has a ;history of excessive snoring.  This is accompanied by excessive daytime fatigue as well as a history of hypertension.  He has not had previous sleep study.      Review of Systems   Constitutional, HEENT, cardiovascular, pulmonary, gi and gu systems are negative, except as otherwise noted.      Objective    BP (!) 150/94 (BP Location: Left arm, Patient Position: Sitting, Cuff Size: Adult Regular)   Pulse 64   Temp 96.9  F (36.1  C) (Tympanic)   Resp 18   Wt 90.7 kg (200 lb)   SpO2 98%   BMI 33.28 kg/m    Body mass index is 33.28 kg/m .  Physical Exam  Vitals  signs and nursing note reviewed.   Constitutional:       Appearance: He is well-developed.   Neurological:      Mental Status: He is alert and oriented to person, place, and time.   Psychiatric:         Mood and Affect: Mood normal.         Behavior: Behavior normal.         Thought Content: Thought content normal.        Other exam not completed    Hospital Outpatient Visit on 02/19/2020   Component Date Value Ref Range Status     Target HR 02/19/2020 174   Final     Baseline Systolic BP 02/19/2020 166   Final     Baseline Diastolic BP 02/19/2020 102   Final     Last Stress Systolic BP 02/19/2020 200   Final     Last Stress Diastolic BP 02/19/2020 80   Final     Baseline HR 02/19/2020 66   Final     Max HR 02/19/2020 157   Final     Calculated Percent HR 02/19/2020 90  % Final     Exercise duration (min) 02/19/2020 8  min Final     Exercise duration (sec) 02/19/2020 0  sec Final     Estimated workload 02/19/2020 10.1  METS Final     Rate Pressure Product 02/19/2020 31,400.0   Final     Nuc Rest EF 02/19/2020 72  % Final     Left Ventricular EF 02/19/2020 68  % Final

## 2021-03-31 ENCOUNTER — OFFICE VISIT (OUTPATIENT)
Dept: FAMILY MEDICINE | Facility: OTHER | Age: 46
End: 2021-03-31
Attending: FAMILY MEDICINE
Payer: COMMERCIAL

## 2021-03-31 VITALS
SYSTOLIC BLOOD PRESSURE: 150 MMHG | BODY MASS INDEX: 33.28 KG/M2 | TEMPERATURE: 96.9 F | OXYGEN SATURATION: 98 % | DIASTOLIC BLOOD PRESSURE: 94 MMHG | HEART RATE: 64 BPM | RESPIRATION RATE: 18 BRPM | WEIGHT: 200 LBS

## 2021-03-31 DIAGNOSIS — R06.83 SNORING: ICD-10-CM

## 2021-03-31 DIAGNOSIS — I10 ESSENTIAL HYPERTENSION: Primary | ICD-10-CM

## 2021-03-31 LAB
ALBUMIN SERPL-MCNC: 3.8 G/DL (ref 3.4–5)
ALBUMIN UR-MCNC: 10 MG/DL
ALP SERPL-CCNC: 82 U/L (ref 40–150)
ALT SERPL W P-5'-P-CCNC: 51 U/L (ref 0–70)
ANION GAP SERPL CALCULATED.3IONS-SCNC: 4 MMOL/L (ref 3–14)
APPEARANCE UR: CLEAR
AST SERPL W P-5'-P-CCNC: 19 U/L (ref 0–45)
BACTERIA #/AREA URNS HPF: ABNORMAL /HPF
BASOPHILS # BLD AUTO: 0 10E9/L (ref 0–0.2)
BASOPHILS NFR BLD AUTO: 0.1 %
BILIRUB SERPL-MCNC: 0.5 MG/DL (ref 0.2–1.3)
BILIRUB UR QL STRIP: NEGATIVE
BUN SERPL-MCNC: 12 MG/DL (ref 7–30)
CALCIUM SERPL-MCNC: 8.9 MG/DL (ref 8.5–10.1)
CHLORIDE SERPL-SCNC: 103 MMOL/L (ref 94–109)
CHOLEST SERPL-MCNC: 197 MG/DL
CO2 SERPL-SCNC: 30 MMOL/L (ref 20–32)
COLOR UR AUTO: ABNORMAL
CREAT SERPL-MCNC: 0.85 MG/DL (ref 0.66–1.25)
DIFFERENTIAL METHOD BLD: ABNORMAL
EOSINOPHIL # BLD AUTO: 0.3 10E9/L (ref 0–0.7)
EOSINOPHIL NFR BLD AUTO: 3.9 %
ERYTHROCYTE [DISTWIDTH] IN BLOOD BY AUTOMATED COUNT: 12.3 % (ref 10–15)
GFR SERPL CREATININE-BSD FRML MDRD: >90 ML/MIN/{1.73_M2}
GLUCOSE SERPL-MCNC: 92 MG/DL (ref 70–99)
GLUCOSE UR STRIP-MCNC: NEGATIVE MG/DL
HCT VFR BLD AUTO: 51.2 % (ref 40–53)
HDLC SERPL-MCNC: 41 MG/DL
HGB BLD-MCNC: 17.2 G/DL (ref 13.3–17.7)
HGB UR QL STRIP: NEGATIVE
IMM GRANULOCYTES # BLD: 0 10E9/L (ref 0–0.4)
IMM GRANULOCYTES NFR BLD: 0.4 %
KETONES UR STRIP-MCNC: NEGATIVE MG/DL
LDLC SERPL CALC-MCNC: 133 MG/DL
LEUKOCYTE ESTERASE UR QL STRIP: NEGATIVE
LYMPHOCYTES # BLD AUTO: 3 10E9/L (ref 0.8–5.3)
LYMPHOCYTES NFR BLD AUTO: 39 %
MCH RBC QN AUTO: 28.7 PG (ref 26.5–33)
MCHC RBC AUTO-ENTMCNC: 33.6 G/DL (ref 31.5–36.5)
MCV RBC AUTO: 86 FL (ref 78–100)
MONOCYTES # BLD AUTO: 0.5 10E9/L (ref 0–1.3)
MONOCYTES NFR BLD AUTO: 6.4 %
MUCOUS THREADS #/AREA URNS LPF: PRESENT /LPF
NEUTROPHILS # BLD AUTO: 3.8 10E9/L (ref 1.6–8.3)
NEUTROPHILS NFR BLD AUTO: 50.2 %
NITRATE UR QL: NEGATIVE
NONHDLC SERPL-MCNC: 156 MG/DL
NRBC # BLD AUTO: 0 10*3/UL
NRBC BLD AUTO-RTO: 0 /100
PH UR STRIP: 7 PH (ref 4.7–8)
PLATELET # BLD AUTO: 257 10E9/L (ref 150–450)
POTASSIUM SERPL-SCNC: 3.7 MMOL/L (ref 3.4–5.3)
PROT SERPL-MCNC: 7.8 G/DL (ref 6.8–8.8)
RBC # BLD AUTO: 5.99 10E12/L (ref 4.4–5.9)
RBC #/AREA URNS AUTO: <1 /HPF (ref 0–2)
SODIUM SERPL-SCNC: 137 MMOL/L (ref 133–144)
SOURCE: ABNORMAL
SP GR UR STRIP: 1.03 (ref 1–1.03)
SQUAMOUS #/AREA URNS AUTO: 0 /HPF (ref 0–1)
TRIGL SERPL-MCNC: 117 MG/DL
TSH SERPL DL<=0.005 MIU/L-ACNC: 1.21 MU/L (ref 0.4–4)
UROBILINOGEN UR STRIP-MCNC: NORMAL MG/DL (ref 0–2)
WBC # BLD AUTO: 7.7 10E9/L (ref 4–11)
WBC #/AREA URNS AUTO: 1 /HPF (ref 0–5)

## 2021-03-31 PROCEDURE — 81001 URINALYSIS AUTO W/SCOPE: CPT | Performed by: FAMILY MEDICINE

## 2021-03-31 PROCEDURE — 80050 GENERAL HEALTH PANEL: CPT | Performed by: FAMILY MEDICINE

## 2021-03-31 PROCEDURE — 80061 LIPID PANEL: CPT | Performed by: FAMILY MEDICINE

## 2021-03-31 PROCEDURE — 36415 COLL VENOUS BLD VENIPUNCTURE: CPT | Performed by: FAMILY MEDICINE

## 2021-03-31 PROCEDURE — 99214 OFFICE O/P EST MOD 30 MIN: CPT | Performed by: FAMILY MEDICINE

## 2021-03-31 RX ORDER — HYDROCHLOROTHIAZIDE 50 MG/1
50 TABLET ORAL DAILY
Qty: 90 TABLET | Refills: 1 | Status: SHIPPED | OUTPATIENT
Start: 2021-03-31 | End: 2021-10-13

## 2021-03-31 ASSESSMENT — PAIN SCALES - GENERAL: PAINLEVEL: NO PAIN (0)

## 2021-03-31 ASSESSMENT — PATIENT HEALTH QUESTIONNAIRE - PHQ9: SUM OF ALL RESPONSES TO PHQ QUESTIONS 1-9: 3

## 2021-03-31 NOTE — NURSING NOTE
"Chief Complaint   Patient presents with     Hypertension       Initial BP (!) 150/94 (BP Location: Left arm, Patient Position: Sitting, Cuff Size: Adult Regular)   Pulse 64   Temp 96.9  F (36.1  C) (Tympanic)   Resp 18   Wt 90.7 kg (200 lb)   SpO2 98%   BMI 33.28 kg/m   Estimated body mass index is 33.28 kg/m  as calculated from the following:    Height as of 3/11/20: 1.651 m (5' 5\").    Weight as of this encounter: 90.7 kg (200 lb).  Medication Reconciliation: complete  Rah Fischer LPN  "

## 2021-10-13 DIAGNOSIS — I10 ESSENTIAL HYPERTENSION: ICD-10-CM

## 2021-10-13 RX ORDER — HYDROCHLOROTHIAZIDE 50 MG/1
TABLET ORAL
Qty: 30 TABLET | Refills: 0 | Status: SHIPPED | OUTPATIENT
Start: 2021-10-13 | End: 2021-11-12

## 2021-10-13 NOTE — TELEPHONE ENCOUNTER
HCTZ      Last Written Prescription Date:  03/31/21  Last Fill Quantity: 90,   # refills: 1  Last Office Visit: 03/31/21  Future Office visit:

## 2021-11-12 DIAGNOSIS — I10 ESSENTIAL HYPERTENSION: ICD-10-CM

## 2021-11-12 RX ORDER — HYDROCHLOROTHIAZIDE 50 MG/1
TABLET ORAL
Qty: 30 TABLET | Refills: 0 | Status: SHIPPED | OUTPATIENT
Start: 2021-11-12 | End: 2021-12-17

## 2021-11-12 NOTE — TELEPHONE ENCOUNTER
HCTZ  Last Written Prescription Date: 10/13/21  Last Fill Quantity: 30 # of Refills: 0  Last Office Visit: 3/31/21

## 2021-11-23 ENCOUNTER — TELEPHONE (OUTPATIENT)
Dept: FAMILY MEDICINE | Facility: OTHER | Age: 46
End: 2021-11-23
Payer: COMMERCIAL

## 2021-11-23 NOTE — TELEPHONE ENCOUNTER
8:04 AM    Reason for Call: Phone Call    Description: Patient called regarding his appt he has with Dr. Pathak tomorrow - he says that there is covid in his household and is wondering if he can change his appt to a telephone visit. Please call him back regarding this    Was an appointment offered for this call? No  If yes : Appointment type              Date    Preferred method for responding to this message: Telephone Call  What is your phone number ?[phone]    If we cannot reach you directly, may we leave a detailed response at the number you provided? Yes    Can this message wait until your PCP/provider returns, if available today? Sandra Arizmendi

## 2021-12-17 ENCOUNTER — OFFICE VISIT (OUTPATIENT)
Dept: FAMILY MEDICINE | Facility: OTHER | Age: 46
End: 2021-12-17
Attending: FAMILY MEDICINE
Payer: COMMERCIAL

## 2021-12-17 VITALS
OXYGEN SATURATION: 97 % | HEART RATE: 94 BPM | RESPIRATION RATE: 18 BRPM | WEIGHT: 185 LBS | DIASTOLIC BLOOD PRESSURE: 86 MMHG | SYSTOLIC BLOOD PRESSURE: 136 MMHG | TEMPERATURE: 98.2 F | BODY MASS INDEX: 30.79 KG/M2

## 2021-12-17 DIAGNOSIS — J01.01 ACUTE RECURRENT MAXILLARY SINUSITIS: ICD-10-CM

## 2021-12-17 DIAGNOSIS — I10 BENIGN ESSENTIAL HYPERTENSION: Primary | ICD-10-CM

## 2021-12-17 PROCEDURE — 99214 OFFICE O/P EST MOD 30 MIN: CPT | Performed by: FAMILY MEDICINE

## 2021-12-17 RX ORDER — AZITHROMYCIN 250 MG/1
TABLET, FILM COATED ORAL
Qty: 6 TABLET | Refills: 0 | Status: SHIPPED | OUTPATIENT
Start: 2021-12-17 | End: 2022-08-04

## 2021-12-17 RX ORDER — HYDROCHLOROTHIAZIDE 50 MG/1
50 TABLET ORAL DAILY
Qty: 90 TABLET | Refills: 1 | Status: SHIPPED | OUTPATIENT
Start: 2021-12-17 | End: 2022-09-06

## 2021-12-17 ASSESSMENT — PAIN SCALES - GENERAL: PAINLEVEL: NO PAIN (0)

## 2021-12-17 NOTE — PROGRESS NOTES
Assessment & Plan     Benign essential hypertension  Goals reviewed.  Continue home BP monitoring and same medication regimen.  Follow up 6 months.      Acute recurrent maxillary sinusitis  Zithromax as written. Continue allergy regimen  - azithromycin (ZITHROMAX) 250 MG tablet; Two tablets first day, then one tablet daily for four days.       See Patient Instructions    No follow-ups on file.    Leno Pathak MD  [facility] [place] Goyal is a 46 year old who presents for the following health issues  accompanied by his spouse.    HPI     Hypertension Follow-up      Do you check your blood pressure regularly outside of the clinic? No     Are you following a low salt diet? No    Are your blood pressures ever more than 140 on the top number (systolic) OR more   than 90 on the bottom number (diastolic), for example 140/90? Yes      How many servings of fruits and vegetables do you eat daily?  2-3    On average, how many sweetened beverages do you drink each day (Examples: soda, juice, sweet tea, etc.  Do NOT count diet or artificially sweetened beverages)?   1    How many days per week do you exercise enough to make your heart beat faster? 3 or less    How many minutes a day do you exercise enough to make your heart beat faster? 9 or less    How many days per week do you miss taking your medication? 0    Jay Jay is seen in follow up in follow up of hypertension.  He generally feels well.  He is recovering from covid 19.    He continues with sinus pressure, congestion and cough.    Review of Systems   Constitutional, HEENT, cardiovascular, pulmonary, gi and gu systems are negative, except as otherwise noted.      Objective    /86 (BP Location: Left arm, Patient Position: Sitting, Cuff Size: Adult Large)   Pulse 94   Temp 98.2  F (36.8  C) (Tympanic)   Resp 18   Wt 83.9 kg (185 lb)   SpO2 97%   BMI 30.79 kg/m    Body mass index is 30.79 kg/m .  Physical Exam  Vitals and nursing  note reviewed.   Constitutional:       General: He is not in acute distress.     Appearance: He is well-developed.   HENT:      Head: Normocephalic and atraumatic.      Right Ear: External ear normal.      Left Ear: External ear normal.      Nose: Nose normal.      Mouth/Throat:      Pharynx: No oropharyngeal exudate.   Pulmonary:      Effort: Pulmonary effort is normal.      Breath sounds: Normal breath sounds. No wheezing or rales.   Musculoskeletal:      Cervical back: Neck supple.   Lymphadenopathy:      Cervical: No cervical adenopathy.   Skin:     General: Skin is warm and dry.   Neurological:      Mental Status: He is alert and oriented to person, place, and time.   Psychiatric:         Mood and Affect: Mood normal.         Behavior: Behavior normal.         Thought Content: Thought content normal.        Other exam not repeated

## 2021-12-17 NOTE — NURSING NOTE
"Chief Complaint   Patient presents with     Recheck Medication       Initial /86 (BP Location: Left arm, Patient Position: Sitting, Cuff Size: Adult Large)   Pulse 94   Temp 98.2  F (36.8  C) (Tympanic)   Resp 18   Wt 83.9 kg (185 lb)   SpO2 97%   BMI 30.79 kg/m   Estimated body mass index is 30.79 kg/m  as calculated from the following:    Height as of 3/11/20: 1.651 m (5' 5\").    Weight as of this encounter: 83.9 kg (185 lb).  Medication Reconciliation: complete  Watson Crowder LPN  "

## 2022-08-04 ENCOUNTER — OFFICE VISIT (OUTPATIENT)
Dept: FAMILY MEDICINE | Facility: OTHER | Age: 47
End: 2022-08-04
Attending: STUDENT IN AN ORGANIZED HEALTH CARE EDUCATION/TRAINING PROGRAM
Payer: COMMERCIAL

## 2022-08-04 VITALS
OXYGEN SATURATION: 95 % | DIASTOLIC BLOOD PRESSURE: 100 MMHG | WEIGHT: 205 LBS | TEMPERATURE: 99.1 F | SYSTOLIC BLOOD PRESSURE: 138 MMHG | BODY MASS INDEX: 32.95 KG/M2 | HEART RATE: 80 BPM | HEIGHT: 66 IN

## 2022-08-04 DIAGNOSIS — E66.09 CLASS 1 OBESITY DUE TO EXCESS CALORIES WITH SERIOUS COMORBIDITY AND BODY MASS INDEX (BMI) OF 32.0 TO 32.9 IN ADULT: ICD-10-CM

## 2022-08-04 DIAGNOSIS — I10 BENIGN ESSENTIAL HYPERTENSION: Primary | ICD-10-CM

## 2022-08-04 DIAGNOSIS — E66.811 CLASS 1 OBESITY DUE TO EXCESS CALORIES WITH SERIOUS COMORBIDITY AND BODY MASS INDEX (BMI) OF 32.0 TO 32.9 IN ADULT: ICD-10-CM

## 2022-08-04 DIAGNOSIS — E78.5 HYPERLIPIDEMIA LDL GOAL <100: ICD-10-CM

## 2022-08-04 PROCEDURE — 99213 OFFICE O/P EST LOW 20 MIN: CPT | Performed by: STUDENT IN AN ORGANIZED HEALTH CARE EDUCATION/TRAINING PROGRAM

## 2022-08-04 RX ORDER — LOSARTAN POTASSIUM 25 MG/1
25 TABLET ORAL DAILY
Qty: 30 TABLET | Refills: 1 | Status: SHIPPED | OUTPATIENT
Start: 2022-08-04 | End: 2022-09-30

## 2022-08-04 ASSESSMENT — PAIN SCALES - GENERAL: PAINLEVEL: NO PAIN (0)

## 2022-08-04 NOTE — PROGRESS NOTES
"  Assessment & Plan     Benign essential hypertension  Blood pressure is high today  Continue losartan  We will continue to follow closely and adjust as needed for blood pressure to be in target range  - Comprehensive metabolic panel; Future  - losartan (COZAAR) 25 MG tablet; Take 1 tablet (25 mg) by mouth daily  - Albumin Random Urine Quantitative with Creat Ratio; Future    Class 1 obesity due to excess calories with serious comorbidity and body mass index (BMI) of 32.0 to 32.9 in adult  BMI of 33  Discussed lifestyle and dietary changes  He is due for hemoglobin A1c to assess for prediabetes diabetes  - Hemoglobin A1c; Future    Hyperlipidemia LDL goal <100  Most recent lipid profile March 31, 2021 shows LDL of 133  I would like for him to repeat fasting lipid profile.  He can do so at the time of his next visit which will be a preventative visit  Currently not on a statin  - Lipid Profile (Chol, Trig, HDL, LDL calc); Future       BMI:   Estimated body mass index is 32.99 kg/m  as calculated from the following:    Height as of this encounter: 1.679 m (5' 6.1\").    Weight as of this encounter: 93 kg (205 lb).   Weight management plan: Discussed healthy diet and exercise guidelines      Liliana Braga MD  [facility] - YASMINE Goyal is a 47 year old male, presenting for the following health issues:  Establish Care and Hypertension      HPI     Establish care     Stabbing pain in the foot. Bruise type muscle pain- has been ongoing for several years.   Had sinus surgery in 2005- Dr. Pathak- with history easier   Has trouble falling asleep    Sinuses worse because of snoring- Has a lot of allergies.    Allergy shots and drops  Gets sinus infections in the spring.  November to early spring.      Impotence issue for several months. Has been having trouble with getting and maintaining erection.  No claudication.  No history of smoking.  No history of coronary artery disease.    Soles of " "the feet him.  Hurts more after being on feet for a while.       Brother had heart attack- late 40s.  4V bypass.      Hypertension Follow-up      Do you check your blood pressure regularly outside of the clinic? Yes     Are you following a low salt diet? No    Are your blood pressures ever more than 140 on the top number (systolic) OR more   than 90 on the bottom number (diastolic), for example 140/90? Yes      How many servings of fruits and vegetables do you eat daily?  2-3    On average, how many sweetened beverages do you drink each day (Examples: soda, juice, sweet tea, etc.  Do NOT count diet or artificially sweetened beverages)?   3    How many days per week do you exercise enough to make your heart beat faster? 7    How many minutes a day do you exercise enough to make your heart beat faster? 20 - 29    How many days per week do you miss taking your medication? 0      Review of Systems   Constitutional, HEENT, cardiovascular, pulmonary, gi and gu systems are negative, except as otherwise noted.      Objective    BP (!) 142/100 (BP Location: Left arm, Patient Position: Chair, Cuff Size: Adult Large)   Pulse 80   Temp 99.1  F (37.3  C) (Tympanic)   Ht 1.679 m (5' 6.1\")   Wt 93 kg (205 lb)   SpO2 95%   BMI 32.99 kg/m    Body mass index is 32.99 kg/m .  Physical Exam   GENERAL: healthy, alert and no distress  EYES: Eyes grossly normal to inspection, PERRL and conjunctivae and sclerae normal  HENT: ear canals and TM's normal, nose and mouth without ulcers or lesions  NECK: no adenopathy, no asymmetry, masses, or scars and thyroid normal to palpation  RESP: lungs clear to auscultation - no rales, rhonchi or wheezes  CV: regular rate and rhythm, normal S1 S2, no S3 or S4, no murmur, click or rub, no peripheral edema and peripheral pulses strong  ABDOMEN: soft, nontender, no hepatosplenomegaly, no masses and bowel sounds normal  MS: no gross musculoskeletal defects noted, no edema  SKIN: no suspicious lesions " or rashes  NEURO: Normal strength and tone, mentation intact and speech normal  PSYCH: mentation appears normal, affect normal/bright

## 2022-08-04 NOTE — NURSING NOTE
"Chief Complaint   Patient presents with     Establish Care     Hypertension       Initial BP (!) 142/100 (BP Location: Left arm, Patient Position: Chair, Cuff Size: Adult Large)   Pulse 80   Temp 99.1  F (37.3  C) (Tympanic)   Ht 1.679 m (5' 6.1\")   Wt 93 kg (205 lb)   SpO2 95%   BMI 32.99 kg/m   Estimated body mass index is 32.99 kg/m  as calculated from the following:    Height as of this encounter: 1.679 m (5' 6.1\").    Weight as of this encounter: 93 kg (205 lb).  Medication Reconciliation: complete  Sariah Alcaraz LPN  "

## 2022-08-19 ENCOUNTER — ALLIED HEALTH/NURSE VISIT (OUTPATIENT)
Dept: FAMILY MEDICINE | Facility: OTHER | Age: 47
End: 2022-08-19
Attending: STUDENT IN AN ORGANIZED HEALTH CARE EDUCATION/TRAINING PROGRAM
Payer: COMMERCIAL

## 2022-08-19 ENCOUNTER — TELEPHONE (OUTPATIENT)
Dept: FAMILY MEDICINE | Facility: OTHER | Age: 47
End: 2022-08-19

## 2022-08-19 DIAGNOSIS — I10 HTN (HYPERTENSION): Primary | ICD-10-CM

## 2022-08-19 PROCEDURE — 99207 PR NO CHARGE LOS: CPT

## 2022-08-19 NOTE — TELEPHONE ENCOUNTER
Patient came in for a blood pressure check:    bp  142/90 pulse 71  He has follow up appointment in September.

## 2022-08-22 NOTE — TELEPHONE ENCOUNTER
I think we should increase the dose to 50 mg per day.  If patient agreeable, will send script to pharmacy

## 2022-09-06 ENCOUNTER — OFFICE VISIT (OUTPATIENT)
Dept: FAMILY MEDICINE | Facility: OTHER | Age: 47
End: 2022-09-06
Attending: STUDENT IN AN ORGANIZED HEALTH CARE EDUCATION/TRAINING PROGRAM
Payer: COMMERCIAL

## 2022-09-06 VITALS
HEIGHT: 66 IN | WEIGHT: 201 LBS | RESPIRATION RATE: 15 BRPM | TEMPERATURE: 97.7 F | OXYGEN SATURATION: 98 % | DIASTOLIC BLOOD PRESSURE: 85 MMHG | HEART RATE: 62 BPM | SYSTOLIC BLOOD PRESSURE: 110 MMHG | BODY MASS INDEX: 32.3 KG/M2

## 2022-09-06 DIAGNOSIS — I10 ESSENTIAL HYPERTENSION: Primary | ICD-10-CM

## 2022-09-06 DIAGNOSIS — E78.5 HYPERLIPIDEMIA LDL GOAL <100: ICD-10-CM

## 2022-09-06 DIAGNOSIS — N52.9 ERECTILE DYSFUNCTION, UNSPECIFIED ERECTILE DYSFUNCTION TYPE: ICD-10-CM

## 2022-09-06 DIAGNOSIS — E66.811 CLASS 1 OBESITY DUE TO EXCESS CALORIES WITH SERIOUS COMORBIDITY AND BODY MASS INDEX (BMI) OF 32.0 TO 32.9 IN ADULT: ICD-10-CM

## 2022-09-06 DIAGNOSIS — E66.09 CLASS 1 OBESITY DUE TO EXCESS CALORIES WITH SERIOUS COMORBIDITY AND BODY MASS INDEX (BMI) OF 32.0 TO 32.9 IN ADULT: ICD-10-CM

## 2022-09-06 DIAGNOSIS — I10 BENIGN ESSENTIAL HYPERTENSION: ICD-10-CM

## 2022-09-06 LAB
ALBUMIN SERPL-MCNC: 3.7 G/DL (ref 3.4–5)
ALP SERPL-CCNC: 82 U/L (ref 40–150)
ALT SERPL W P-5'-P-CCNC: 53 U/L (ref 0–70)
ANION GAP SERPL CALCULATED.3IONS-SCNC: 5 MMOL/L (ref 3–14)
AST SERPL W P-5'-P-CCNC: 20 U/L (ref 0–45)
BILIRUB SERPL-MCNC: 0.7 MG/DL (ref 0.2–1.3)
BUN SERPL-MCNC: 10 MG/DL (ref 7–30)
CALCIUM SERPL-MCNC: 9 MG/DL (ref 8.5–10.1)
CHLORIDE BLD-SCNC: 104 MMOL/L (ref 94–109)
CHOLEST SERPL-MCNC: 172 MG/DL
CO2 SERPL-SCNC: 28 MMOL/L (ref 20–32)
CREAT SERPL-MCNC: 0.86 MG/DL (ref 0.66–1.25)
CREAT UR-MCNC: 128 MG/DL
EST. AVERAGE GLUCOSE BLD GHB EST-MCNC: 111 MG/DL
FASTING STATUS PATIENT QL REPORTED: YES
GFR SERPL CREATININE-BSD FRML MDRD: >90 ML/MIN/1.73M2
GLUCOSE BLD-MCNC: 90 MG/DL (ref 70–99)
HBA1C MFR BLD: 5.5 % (ref 0–5.6)
HDLC SERPL-MCNC: 43 MG/DL
LDLC SERPL CALC-MCNC: 104 MG/DL
MICROALBUMIN UR-MCNC: 6 MG/L
MICROALBUMIN/CREAT UR: 4.69 MG/G CR (ref 0–17)
NONHDLC SERPL-MCNC: 129 MG/DL
POTASSIUM BLD-SCNC: 3.9 MMOL/L (ref 3.4–5.3)
PROT SERPL-MCNC: 7.3 G/DL (ref 6.8–8.8)
SODIUM SERPL-SCNC: 137 MMOL/L (ref 133–144)
TRIGL SERPL-MCNC: 126 MG/DL

## 2022-09-06 PROCEDURE — 82043 UR ALBUMIN QUANTITATIVE: CPT | Performed by: STUDENT IN AN ORGANIZED HEALTH CARE EDUCATION/TRAINING PROGRAM

## 2022-09-06 PROCEDURE — 80053 COMPREHEN METABOLIC PANEL: CPT | Performed by: STUDENT IN AN ORGANIZED HEALTH CARE EDUCATION/TRAINING PROGRAM

## 2022-09-06 PROCEDURE — 83036 HEMOGLOBIN GLYCOSYLATED A1C: CPT | Performed by: STUDENT IN AN ORGANIZED HEALTH CARE EDUCATION/TRAINING PROGRAM

## 2022-09-06 PROCEDURE — 99214 OFFICE O/P EST MOD 30 MIN: CPT | Performed by: STUDENT IN AN ORGANIZED HEALTH CARE EDUCATION/TRAINING PROGRAM

## 2022-09-06 PROCEDURE — 36415 COLL VENOUS BLD VENIPUNCTURE: CPT | Performed by: STUDENT IN AN ORGANIZED HEALTH CARE EDUCATION/TRAINING PROGRAM

## 2022-09-06 PROCEDURE — 80061 LIPID PANEL: CPT | Performed by: STUDENT IN AN ORGANIZED HEALTH CARE EDUCATION/TRAINING PROGRAM

## 2022-09-06 PROCEDURE — 84403 ASSAY OF TOTAL TESTOSTERONE: CPT | Performed by: STUDENT IN AN ORGANIZED HEALTH CARE EDUCATION/TRAINING PROGRAM

## 2022-09-06 PROCEDURE — 84270 ASSAY OF SEX HORMONE GLOBUL: CPT | Performed by: STUDENT IN AN ORGANIZED HEALTH CARE EDUCATION/TRAINING PROGRAM

## 2022-09-06 ASSESSMENT — PAIN SCALES - GENERAL: PAINLEVEL: NO PAIN (0)

## 2022-09-06 NOTE — PROGRESS NOTES
"  Assessment & Plan     Benign essential hypertension  Blood pressure at goal  Continue current regimen with Losartan 25 mg QD  - Comprehensive metabolic panel  - Albumin Random Urine Quantitative with Creat Ratio    Class 1 obesity due to excess calories with serious comorbidity and body mass index (BMI) of 32.0 to 32.9 in adult  BMI 32, high risk for metabolic syndrome  He is working on weight loss through healthier eating and exercise  - Hemoglobin A1c    Hyperlipidemia LDL goal <100  Due for lipid profile  - Lipid Profile (Chol, Trig, HDL, LDL calc)    Erectile dysfunction, unspecified erectile dysfunction type  Ongoing for at least several months now.  Has been having issues with achieving and maintaining erection  Patient requesting testosterone check, I think this is not unreasonable given his symptoms  Will also check other labs: HA1c, lipid profile, thyroid  Possible depression?  - Testosterone Free and Total    No follow-ups on file.    Liliana Braga MD  [facility] - YASMINE Goyal is a 47 year old male, presenting for the following health issues:  Hypertension      HPI     Go for walks every day.  Has been trying to eat healthy.  Has not been watching his sodium intake.      Still has ongoing impotence.  He would like to have his testosterone level checked today    Hypertension Follow-up      Do you check your blood pressure regularly outside of the clinic? Yes     Are you following a low salt diet? No    Are your blood pressures ever more than 140 on the top number (systolic) OR more   than 90 on the bottom number (diastolic), for example 140/90? Yes      Review of Systems   Constitutional, HEENT, cardiovascular, pulmonary, gi and gu systems are negative, except as otherwise noted.      Objective    /85 (BP Location: Right arm, Patient Position: Chair)   Pulse 62   Temp 97.7  F (36.5  C)   Resp 15   Ht 1.676 m (5' 6\")   Wt 91.2 kg (201 lb)   SpO2 98%   BMI 32.44 " kg/m    Body mass index is 32.44 kg/m .  Physical Exam   GENERAL: healthy, alert and no distress  EYES: Eyes grossly normal to inspection, PERRL and conjunctivae and sclerae normal  HENT: ear canals and TM's normal, nose and mouth without ulcers or lesions  NECK: no adenopathy, no asymmetry, masses, or scars and thyroid normal to palpation  RESP: lungs clear to auscultation - no rales, rhonchi or wheezes  CV: regular rate and rhythm, normal S1 S2, no S3 or S4, no murmur, click or rub, no peripheral edema and peripheral pulses strong  ABDOMEN: soft, nontender, no hepatosplenomegaly, no masses and bowel sounds normal  MS: no gross musculoskeletal defects noted, no edema  SKIN: no suspicious lesions or rashes  NEURO: Normal strength and tone, mentation intact and speech normal  PSYCH: mentation appears normal, affect slightly flat

## 2022-09-06 NOTE — NURSING NOTE
"Chief Complaint   Patient presents with     Hypertension       Initial /85 (BP Location: Right arm, Patient Position: Chair)   Pulse 62   Temp 97.7  F (36.5  C)   Resp 15   Ht 1.676 m (5' 6\")   Wt 91.2 kg (201 lb)   SpO2 98%   BMI 32.44 kg/m   Estimated body mass index is 32.44 kg/m  as calculated from the following:    Height as of this encounter: 1.676 m (5' 6\").    Weight as of this encounter: 91.2 kg (201 lb).  Medication Reconciliation: complete  Alessia Chinchilla LPN    "

## 2022-09-07 LAB — SHBG SERPL-SCNC: 31 NMOL/L (ref 11–80)

## 2022-09-08 LAB
TESTOST FREE SERPL-MCNC: 7.17 NG/DL
TESTOST SERPL-MCNC: 346 NG/DL (ref 240–950)

## 2022-09-15 NOTE — TELEPHONE ENCOUNTER
Dr. Melgoza routed to me and not to the  nursing pool. The patient had a follow up on 9/6/2022. hydrochlorothiazide was discontinued on med list.

## 2022-09-30 DIAGNOSIS — I10 BENIGN ESSENTIAL HYPERTENSION: ICD-10-CM

## 2022-09-30 RX ORDER — LOSARTAN POTASSIUM 25 MG/1
TABLET ORAL
Qty: 30 TABLET | Refills: 0 | Status: SHIPPED | OUTPATIENT
Start: 2022-09-30 | End: 2022-11-03

## 2022-09-30 NOTE — TELEPHONE ENCOUNTER
Losartan (Cozaar) 25 MG tablet      Last Written Prescription Date:  8/4/22  Last Fill Quantity: 30,   # refills: 1  Last Office Visit: 9/6/22  Future Office visit:

## 2022-11-01 DIAGNOSIS — I10 BENIGN ESSENTIAL HYPERTENSION: ICD-10-CM

## 2022-11-03 RX ORDER — LOSARTAN POTASSIUM 25 MG/1
TABLET ORAL
Qty: 30 TABLET | Refills: 4 | Status: SHIPPED | OUTPATIENT
Start: 2022-11-03 | End: 2023-04-04

## 2022-12-20 ENCOUNTER — MYC MEDICAL ADVICE (OUTPATIENT)
Dept: FAMILY MEDICINE | Facility: OTHER | Age: 47
End: 2022-12-20

## 2022-12-20 DIAGNOSIS — N52.9 ERECTILE DYSFUNCTION, UNSPECIFIED ERECTILE DYSFUNCTION TYPE: Primary | ICD-10-CM

## 2022-12-21 RX ORDER — SILDENAFIL 50 MG/1
50 TABLET, FILM COATED ORAL DAILY PRN
Qty: 30 TABLET | Refills: 0 | Status: SHIPPED | OUTPATIENT
Start: 2022-12-21 | End: 2024-01-07

## 2022-12-21 NOTE — TELEPHONE ENCOUNTER
Chart reviewed.  Previous office visit to discuss erectile dysfunction and lab screening normal.    Agree with trial of PDE 5 inhibitor and follow-up with PCP.    Erectile dysfunction, unspecified erectile dysfunction type  - sildenafil (VIAGRA) 50 MG tablet; Take 1 tablet (50 mg) by mouth daily as needed (Take 1 tab 30-60 minutes prior to sexual intercourse; once daily as needed)  Dispense: 30 tablet; Refill: 0    Baldemar Gaspar MD  [facility] [place]

## 2023-03-24 ENCOUNTER — OFFICE VISIT (OUTPATIENT)
Dept: FAMILY MEDICINE | Facility: OTHER | Age: 48
End: 2023-03-24
Attending: STUDENT IN AN ORGANIZED HEALTH CARE EDUCATION/TRAINING PROGRAM
Payer: COMMERCIAL

## 2023-03-24 VITALS
OXYGEN SATURATION: 98 % | WEIGHT: 195.8 LBS | TEMPERATURE: 100.2 F | HEART RATE: 76 BPM | SYSTOLIC BLOOD PRESSURE: 142 MMHG | BODY MASS INDEX: 31.6 KG/M2 | DIASTOLIC BLOOD PRESSURE: 88 MMHG | RESPIRATION RATE: 18 BRPM

## 2023-03-24 DIAGNOSIS — Z12.11 ENCOUNTER FOR SCREENING COLONOSCOPY: ICD-10-CM

## 2023-03-24 DIAGNOSIS — I10 ESSENTIAL HYPERTENSION: Primary | ICD-10-CM

## 2023-03-24 DIAGNOSIS — M25.562 ACUTE PAIN OF LEFT KNEE: ICD-10-CM

## 2023-03-24 PROCEDURE — 99213 OFFICE O/P EST LOW 20 MIN: CPT | Performed by: STUDENT IN AN ORGANIZED HEALTH CARE EDUCATION/TRAINING PROGRAM

## 2023-03-24 ASSESSMENT — PAIN SCALES - GENERAL: PAINLEVEL: NO PAIN (0)

## 2023-03-24 NOTE — PROGRESS NOTES
"  Assessment & Plan     Essential hypertension  Blood pressure above goal.    142/88 in clinic today.  However, patient acutely stressed with recent family member's suicide attempt.  Previously well controlled  Advise continued monitoring at home and if consistently 130/80 or higher, recommend adjusting medication  Historically blood pressure has been well controlled on current dose.  Reviewed DASH diet.  Emphasized healthy diet and regular exercise to promote weight loss.    Encounter for screening colonoscopy  Due for colonoscopy.  Would like to wait for this until summer.  - Adult GI  Referral - Procedure Only; Future    Left knee pain  Acute onset x 1 week.  No re flag s/s.   Suspect strain or sprain.  Continue to monitor.  Advise on activity modification, RICE and OTC NSAIDS as needed for pain control  RTC in 2-3 weeks if symptoms worsen or fail to improve.    Patient is agreeable with this plan.         BMI:   Estimated body mass index is 31.6 kg/m  as calculated from the following:    Height as of 9/6/22: 1.676 m (5' 6\").    Weight as of this encounter: 88.8 kg (195 lb 12.8 oz).   Weight management plan: Discussed healthy diet and exercise guidelines      Return in about 6 months (around 9/24/2023) for Routine preventive.    Liliana Braga MD  [facility] - Y[place]INE Goyal is a 47 year old, presenting for the following health issues:  Recheck Medication and Hypertension  No flowsheet data found.  HPI     Left knee has been bothering him lately- Pain ongoing for the last week or so.  Gives out- sharp pain under knee cap     Hypertension Follow-up      Do you check your blood pressure regularly outside of the clinic? No     Are you following a low salt diet? No    Are your blood pressures ever more than 140 on the top number (systolic) OR more   than 90 on the bottom number (diastolic), for example 140/90? n/a      How many servings of fruits and vegetables do you eat " daily?  0-1    On average, how many sweetened beverages do you drink each day (Examples: soda, juice, sweet tea, etc.  Do NOT count diet or artificially sweetened beverages)?   2    How many days per week do you exercise enough to make your heart beat faster? 3 or less    How many minutes a day do you exercise enough to make your heart beat faster? 9 or less    How many days per week do you miss taking your medication? 0        Review of Systems   Constitutional, HEENT, cardiovascular, pulmonary, gi and gu systems are negative, except as otherwise noted.      Objective    BP (!) 142/88   Pulse 76   Temp 100.2  F (37.9  C)   Resp 18   Wt 88.8 kg (195 lb 12.8 oz)   SpO2 98%   BMI 31.60 kg/m    Body mass index is 31.6 kg/m .  Physical Exam   GENERAL: healthy, alert and no distress  EYES: Eyes grossly normal to inspection, PERRL and conjunctivae and sclerae normal  HENT: ear canals and TM's normal, nose and mouth without ulcers or lesions  NECK: no adenopathy, no asymmetry, masses, or scars and thyroid normal to palpation  RESP: lungs clear to auscultation - no rales, rhonchi or wheezes  CV: regular rate and rhythm, normal S1 S2, no S3 or S4, no murmur, click or rub, no peripheral edema and peripheral pulses strong  ABDOMEN: soft, nontender, no hepatosplenomegaly, no masses and bowel sounds normal  MS: no gross musculoskeletal defects noted, no edema, Left and right knee normal strength and normal AROM.    SKIN: no suspicious lesions or rashes  NEURO: Normal strength and tone, mentation intact and speech normal  PSYCH: mentation appears normal, affect normal/bright

## 2023-03-28 ENCOUNTER — TELEPHONE (OUTPATIENT)
Dept: FAMILY MEDICINE | Facility: OTHER | Age: 48
End: 2023-03-28

## 2023-03-31 DIAGNOSIS — I10 BENIGN ESSENTIAL HYPERTENSION: ICD-10-CM

## 2023-04-04 ENCOUNTER — TELEPHONE (OUTPATIENT)
Dept: FAMILY MEDICINE | Facility: OTHER | Age: 48
End: 2023-04-04

## 2023-04-04 DIAGNOSIS — Z12.11 ENCOUNTER FOR SCREENING COLONOSCOPY: Primary | ICD-10-CM

## 2023-04-04 RX ORDER — LOSARTAN POTASSIUM 25 MG/1
TABLET ORAL
Qty: 30 TABLET | Refills: 0 | Status: SHIPPED | OUTPATIENT
Start: 2023-04-04 | End: 2023-05-05

## 2023-04-04 RX ORDER — BISACODYL 5 MG/1
10 TABLET, DELAYED RELEASE ORAL ONCE
Qty: 2 TABLET | Refills: 0 | Status: SHIPPED | OUTPATIENT
Start: 2023-04-04 | End: 2023-04-04

## 2023-04-04 NOTE — TELEPHONE ENCOUNTER
Patient scheduled screening colonoscopy 06/30/23 with Dr. Arellano, instructions for bowel prep mailed today.

## 2023-04-04 NOTE — TELEPHONE ENCOUNTER
Pt of     Losartan     Last Written Prescription Date: 11.3.2022   Last Fill Quantity: 30,   # refills: 4  Last Office Visit: 3.24.2023  Future Office visit:       Routing refill request to provider for review/approval because:  Angiotensin-II Receptors Failed 03/31/2023 10:36 AM   Protocol Details  Last blood pressure under 140/90 in past 12 months       Tsering Schroeder RN

## 2023-04-04 NOTE — TELEPHONE ENCOUNTER
Screening Questions for the Scheduling of Screening Colonoscopies     (If Colonoscopy is diagnostic, Provider should review the chart before scheduling.)    Are you younger than 50 or older than 80?  Yes, 47 year old    Do you take aspirin or fish oil?  No (if yes, tell patient to stop 1 week prior to Colonoscopy)    Do you take warfarin (Coumadin), clopidogrel (Plavix), apixaban (Eliquis), dabigatram (Pradaxa), rivaroxaban (Xarelto) or any blood thinner? No    Do you use oxygen at home?  No    Do you have kidney disease? No    Are you on dialysis? No    Have you had a stroke or heart attack in the last year? No    Have you had a stent in your heart or any blood vessel in the last year? No    Have you had a transplant of any organ?  No    Have you had a colonoscopy or upper endoscopy (EGD) before?  No         Date of scheduled Colonoscopy: 6/30/23    Provider: Dr Arellano     [facility]

## 2023-05-04 DIAGNOSIS — I10 BENIGN ESSENTIAL HYPERTENSION: ICD-10-CM

## 2023-05-05 RX ORDER — LOSARTAN POTASSIUM 25 MG/1
TABLET ORAL
Qty: 30 TABLET | Refills: 0 | Status: SHIPPED | OUTPATIENT
Start: 2023-05-05 | End: 2023-06-07

## 2023-06-05 DIAGNOSIS — I10 BENIGN ESSENTIAL HYPERTENSION: ICD-10-CM

## 2023-06-06 ENCOUNTER — TELEPHONE (OUTPATIENT)
Dept: FAMILY MEDICINE | Facility: OTHER | Age: 48
End: 2023-06-06

## 2023-06-07 RX ORDER — LOSARTAN POTASSIUM 25 MG/1
TABLET ORAL
Qty: 30 TABLET | Refills: 0 | Status: SHIPPED | OUTPATIENT
Start: 2023-06-07 | End: 2023-07-05

## 2023-06-07 NOTE — TELEPHONE ENCOUNTER
losartan      Last Written Prescription Date:  5/5/23  Last Fill Quantity: 30,   # refills: 0  Last Office Visit: 3/24/23  Future Office visit:

## 2023-07-03 DIAGNOSIS — I10 BENIGN ESSENTIAL HYPERTENSION: ICD-10-CM

## 2023-07-05 RX ORDER — LOSARTAN POTASSIUM 25 MG/1
TABLET ORAL
Qty: 30 TABLET | Refills: 0 | Status: SHIPPED | OUTPATIENT
Start: 2023-07-05 | End: 2023-08-10

## 2023-07-05 NOTE — TELEPHONE ENCOUNTER
Losartan 25 mg      Last Written Prescription Date:  6/7/23  Last Fill Quantity: 30,   # refills: 0  Last Office Visit: 3/24/23  Future Office visit:    Next 5 appointments (look out 90 days)    Sep 26, 2023  4:00 PM  (Arrive by 3:45 PM)  PHYSICAL with Liliana Braga MD  [facility] ([facility] ) [address] [place] [address] [place]  [place] [address]  [phone]           Routing refill request to provider for review/approval because:  Angiotensin-II Receptors Failed      Last blood pressure under 140/90 in past 12 months        BP Readings from Last 3 Encounters:   03/24/23 (!) 142/88   09/06/22 110/85   08/04/22 (!) 138/100

## 2023-08-07 DIAGNOSIS — I10 BENIGN ESSENTIAL HYPERTENSION: ICD-10-CM

## 2023-08-09 NOTE — TELEPHONE ENCOUNTER
losartan (COZAAR) 25 MG tablet 30 tablet 0 7/5/2023       Last Office Visit: 03/24/23  Future Office visit:       Routing refill request to provider for review/approval because:

## 2023-08-10 RX ORDER — LOSARTAN POTASSIUM 25 MG/1
TABLET ORAL
Qty: 30 TABLET | Refills: 0 | Status: SHIPPED | OUTPATIENT
Start: 2023-08-10 | End: 2023-09-05

## 2023-09-02 DIAGNOSIS — I10 BENIGN ESSENTIAL HYPERTENSION: ICD-10-CM

## 2023-09-05 RX ORDER — LOSARTAN POTASSIUM 25 MG/1
TABLET ORAL
Qty: 30 TABLET | Refills: 0 | Status: SHIPPED | OUTPATIENT
Start: 2023-09-05 | End: 2023-10-02

## 2023-09-05 NOTE — TELEPHONE ENCOUNTER
Losartan      Last Written Prescription Date:  8/10/23  Last Fill Quantity: 30,   # refills: 0  Last Office Visit: 3/24/23  Future Office visit:    Next 5 appointments (look out 90 days)      Nov 17, 2023  3:30 PM  (Arrive by 3:15 PM)  SHORT with Liliana Braga MD  [facility] [place] ([facility] [place] ) [address]  [place] [address]  [phone]             Routing refill request to provider for review/approval because:

## 2023-09-11 ENCOUNTER — HOSPITAL ENCOUNTER (EMERGENCY)
Facility: HOSPITAL | Age: 48
Discharge: HOME | End: 2023-09-11
Attending: PHYSICIAN ASSISTANT | Admitting: PHYSICIAN ASSISTANT
Payer: COMMERCIAL

## 2023-09-11 VITALS
SYSTOLIC BLOOD PRESSURE: 137 MMHG | TEMPERATURE: 97.7 F | DIASTOLIC BLOOD PRESSURE: 98 MMHG | HEART RATE: 58 BPM | RESPIRATION RATE: 17 BRPM | OXYGEN SATURATION: 98 %

## 2023-09-11 DIAGNOSIS — M25.512 LEFT SHOULDER PAIN: ICD-10-CM

## 2023-09-11 DIAGNOSIS — R20.2 LEFT HAND PARESTHESIA: ICD-10-CM

## 2023-09-11 LAB
ANION GAP SERPL CALCULATED.3IONS-SCNC: 10 MMOL/L (ref 7–15)
BASOPHILS # BLD AUTO: 0 10E3/UL (ref 0–0.2)
BASOPHILS NFR BLD AUTO: 0 %
BUN SERPL-MCNC: 12.9 MG/DL (ref 6–20)
CALCIUM SERPL-MCNC: 9.3 MG/DL (ref 8.6–10)
CHLORIDE SERPL-SCNC: 99 MMOL/L (ref 98–107)
CREAT SERPL-MCNC: 0.89 MG/DL (ref 0.67–1.17)
DEPRECATED HCO3 PLAS-SCNC: 27 MMOL/L (ref 22–29)
EGFRCR SERPLBLD CKD-EPI 2021: >90 ML/MIN/1.73M2
EOSINOPHIL # BLD AUTO: 0.1 10E3/UL (ref 0–0.7)
EOSINOPHIL NFR BLD AUTO: 1 %
ERYTHROCYTE [DISTWIDTH] IN BLOOD BY AUTOMATED COUNT: 12.9 % (ref 10–15)
GLUCOSE SERPL-MCNC: 99 MG/DL (ref 70–99)
HCT VFR BLD AUTO: 45.7 % (ref 40–53)
HGB BLD-MCNC: 15.3 G/DL (ref 13.3–17.7)
HOLD SPECIMEN: NORMAL
IMM GRANULOCYTES # BLD: 0 10E3/UL
IMM GRANULOCYTES NFR BLD: 1 %
LYMPHOCYTES # BLD AUTO: 1.8 10E3/UL (ref 0.8–5.3)
LYMPHOCYTES NFR BLD AUTO: 21 %
MCH RBC QN AUTO: 28.9 PG (ref 26.5–33)
MCHC RBC AUTO-ENTMCNC: 33.5 G/DL (ref 31.5–36.5)
MCV RBC AUTO: 86 FL (ref 78–100)
MONOCYTES # BLD AUTO: 0.5 10E3/UL (ref 0–1.3)
MONOCYTES NFR BLD AUTO: 6 %
NEUTROPHILS # BLD AUTO: 5.8 10E3/UL (ref 1.6–8.3)
NEUTROPHILS NFR BLD AUTO: 71 %
NRBC # BLD AUTO: 0 10E3/UL
NRBC BLD AUTO-RTO: 0 /100
PLATELET # BLD AUTO: 224 10E3/UL (ref 150–450)
POTASSIUM SERPL-SCNC: 3.9 MMOL/L (ref 3.4–5.3)
RBC # BLD AUTO: 5.29 10E6/UL (ref 4.4–5.9)
SODIUM SERPL-SCNC: 136 MMOL/L (ref 136–145)
TROPONIN T SERPL HS-MCNC: <6 NG/L
WBC # BLD AUTO: 8.2 10E3/UL (ref 4–11)

## 2023-09-11 PROCEDURE — 99284 EMERGENCY DEPT VISIT MOD MDM: CPT

## 2023-09-11 PROCEDURE — 84484 ASSAY OF TROPONIN QUANT: CPT | Performed by: PHYSICIAN ASSISTANT

## 2023-09-11 PROCEDURE — 80048 BASIC METABOLIC PNL TOTAL CA: CPT | Performed by: PHYSICIAN ASSISTANT

## 2023-09-11 PROCEDURE — 93010 ELECTROCARDIOGRAM REPORT: CPT | Performed by: INTERNAL MEDICINE

## 2023-09-11 PROCEDURE — 36415 COLL VENOUS BLD VENIPUNCTURE: CPT | Performed by: PHYSICIAN ASSISTANT

## 2023-09-11 PROCEDURE — 93005 ELECTROCARDIOGRAM TRACING: CPT

## 2023-09-11 PROCEDURE — 85025 COMPLETE CBC W/AUTO DIFF WBC: CPT | Performed by: PHYSICIAN ASSISTANT

## 2023-09-11 PROCEDURE — 99284 EMERGENCY DEPT VISIT MOD MDM: CPT | Performed by: PHYSICIAN ASSISTANT

## 2023-09-11 RX ORDER — HYDROMORPHONE HYDROCHLORIDE 1 MG/ML
0.3 INJECTION, SOLUTION INTRAMUSCULAR; INTRAVENOUS; SUBCUTANEOUS ONCE
Status: DISCONTINUED | OUTPATIENT
Start: 2023-09-11 | End: 2023-09-11

## 2023-09-11 ASSESSMENT — ENCOUNTER SYMPTOMS
FATIGUE: 0
ACTIVITY CHANGE: 0
COUGH: 0
BRUISES/BLEEDS EASILY: 0
CHILLS: 0
ABDOMINAL PAIN: 0
FEVER: 0
NECK STIFFNESS: 0
HEADACHES: 0
NUMBNESS: 1
SHORTNESS OF BREATH: 0
APPETITE CHANGE: 0
WEAKNESS: 0
PALPITATIONS: 0
CHEST TIGHTNESS: 0

## 2023-09-11 ASSESSMENT — ACTIVITIES OF DAILY LIVING (ADL): ADLS_ACUITY_SCORE: 35

## 2023-09-11 NOTE — ED PROVIDER NOTES
"  History     Chief Complaint   Patient presents with    Shoulder Pain     The history is provided by the patient.     Jay Jay Chatman is a 48 year old male who presented to the emergency department ambulatory along with significant other for evaluation of left shoulder pain with radiation to his left arm.  Some mild left-sided neck pain.  Also has numbness and tingling of his left fourth and fifth digit.  No falls.  No chest pain.  No chest pressure.  No fevers.  Negative stress test in 2020.  No history of coronary disease.  The patient tells me \"this has happened a few times before in the past.\"    Allergies:  No Known Allergies    Problem List:    Patient Active Problem List    Diagnosis Date Noted    Benign essential hypertension 03/11/2020     Priority: Medium    NAVIN (obstructive sleep apnea) 03/11/2020     Priority: Medium    Other chest pain 03/11/2020     Priority: Medium    Left axis deviation 03/11/2020     Priority: Medium    Lumbar disc herniation 05/23/2016     Priority: Medium    ACP (advance care planning) 05/11/2016     Priority: Medium     Advance Care Planning 5/11/2016: ACP Review of Chart / Resources Provided:  Reviewed chart for advance care plan.  Jay Jay Chatman has no plan or code status on file. Discussed available resources and provided with information. Confirmed code status reflects current choices pending further ACP discussions.  Confirmed/documented legally designated decision makers.  Added by Ivone Jim            Low back pain, left L4-L5 radiculopathy 02/24/2016     Priority: Medium    Seasonal allergic rhinitis 08/25/2015     Priority: Medium        Past Medical History:    Past Medical History:   Diagnosis Date    Acute sinusitis, unspecified 10/09/2006       Past Surgical History:    Past Surgical History:   Procedure Laterality Date    ADENOIDECTOMY  04/2006    FESS  02/2010    TONSILLECTOMY  04/2006    ventilation tubes, bilateral x 2  04/2006       Family History:  "   Family History   Problem Relation Age of Onset    Diabetes Mother     Cerebrovascular Disease Mother     Heart Disease Mother         stents    Diabetes Father     Other - See Comments Brother 4        myocardial infarction    Heart Disease Brother         MI    Other - See Comments Sister         kidney problems; status post nephrectomy       Social History:  Marital Status:   [2]  Social History     Tobacco Use    Smoking status: Never    Smokeless tobacco: Never    Tobacco comments:     no passive exposure   Substance Use Topics    Alcohol use: No    Drug use: No        Medications:    Cetirizine HCl (ZYRTEC PO)  losartan (COZAAR) 25 MG tablet  sildenafil (VIAGRA) 50 MG tablet          Review of Systems   Constitutional:  Negative for activity change, appetite change, chills, fatigue and fever.   Respiratory:  Negative for cough, chest tightness and shortness of breath.    Cardiovascular:  Negative for chest pain and palpitations.   Gastrointestinal:  Negative for abdominal pain.   Genitourinary: Negative.    Musculoskeletal:  Negative for neck stiffness.   Skin: Negative.    Neurological:  Positive for numbness. Negative for weakness and headaches.   Hematological:  Does not bruise/bleed easily.       Physical Exam   BP: (!) 165/116  Pulse: 67  Temp: 97.2  F (36.2  C)  Resp: 16  SpO2: 97 %      Physical Exam  Vitals and nursing note reviewed.   Constitutional:       General: He is not in acute distress.     Appearance: Normal appearance. He is normal weight. He is not ill-appearing, toxic-appearing or diaphoretic.   Cardiovascular:      Rate and Rhythm: Normal rate and regular rhythm.   Pulmonary:      Effort: Pulmonary effort is normal.      Breath sounds: Normal breath sounds.   Musculoskeletal:      Cervical back: Normal range of motion and neck supple.      Comments: No weakness   Skin:     General: Skin is warm and dry.      Capillary Refill: Capillary refill takes less than 2 seconds.    Neurological:      General: No focal deficit present.      Mental Status: He is alert and oriented to person, place, and time.   Psychiatric:         Mood and Affect: Mood normal.         ED Course              ED Course as of 09/11/23 1932   Mon Sep 11, 2023   1815 My independent review of the EKG shows a normal sinus rhythm at a rate of 70.  Normal WV interval.  Normal QRS duration.  Normal QTc.  Normal axis.  There are no concerning ST segments.  There are no concerning T waves.  There is no evidence of ectopy, preexcitation, or ischemia.  There is no change from 2020.   1926 Broad differential diagnosis was considered and includes but is not limited to cervical radiculopathy, shoulder etiology, ulnar neuropathy, ACS.     Procedures              Critical Care time:  none               Results for orders placed or performed during the hospital encounter of 09/11/23 (from the past 24 hour(s))   EKG 12 lead   Result Value Ref Range    Systolic Blood Pressure  mmHg    Diastolic Blood Pressure  mmHg    Ventricular Rate 70 BPM    Atrial Rate 70 BPM    WV Interval 160 ms    QRS Duration 94 ms     ms    QTc 412 ms    P Axis 48 degrees    R AXIS -12 degrees    T Axis 33 degrees    Interpretation ECG       Sinus rhythm  Possible Left atrial enlargement  Incomplete right bundle branch block  Borderline ECG  No previous ECGs available     CBC with platelets differential    Narrative    The following orders were created for panel order CBC with platelets differential.  Procedure                               Abnormality         Status                     ---------                               -----------         ------                     CBC with platelets and d...[[id number]]                      Final result                 Please view results for these tests on the individual orders.   Basic metabolic panel   Result Value Ref Range    Sodium 136 136 - 145 mmol/L    Potassium 3.9 3.4 - 5.3 mmol/L    Chloride 99 98  - 107 mmol/L    Carbon Dioxide (CO2) 27 22 - 29 mmol/L    Anion Gap 10 7 - 15 mmol/L    Urea Nitrogen 12.9 6.0 - 20.0 mg/dL    Creatinine 0.89 0.67 - 1.17 mg/dL    Calcium 9.3 8.6 - 10.0 mg/dL    Glucose 99 70 - 99 mg/dL    GFR Estimate >90 >60 mL/min/1.73m2   Troponin T, High Sensitivity   Result Value Ref Range    Troponin T, High Sensitivity <6 <=22 ng/L   CBC with platelets and differential   Result Value Ref Range    WBC Count 8.2 4.0 - 11.0 10e3/uL    RBC Count 5.29 4.40 - 5.90 10e6/uL    Hemoglobin 15.3 13.3 - 17.7 g/dL    Hematocrit 45.7 40.0 - 53.0 %    MCV 86 78 - 100 fL    MCH 28.9 26.5 - 33.0 pg    MCHC 33.5 31.5 - 36.5 g/dL    RDW 12.9 10.0 - 15.0 %    Platelet Count 224 150 - 450 10e3/uL    % Neutrophils 71 %    % Lymphocytes 21 %    % Monocytes 6 %    % Eosinophils 1 %    % Basophils 0 %    % Immature Granulocytes 1 %    NRBCs per 100 WBC 0 <1 /100    Absolute Neutrophils 5.8 1.6 - 8.3 10e3/uL    Absolute Lymphocytes 1.8 0.8 - 5.3 10e3/uL    Absolute Monocytes 0.5 0.0 - 1.3 10e3/uL    Absolute Eosinophils 0.1 0.0 - 0.7 10e3/uL    Absolute Basophils 0.0 0.0 - 0.2 10e3/uL    Absolute Immature Granulocytes 0.0 <=0.4 10e3/uL    Absolute NRBCs 0.0 10e3/uL   Extra Tube    Narrative    The following orders were created for panel order Extra Tube.  Procedure                               Abnormality         Status                     ---------                               -----------         ------                     Extra Blue Top Tube[[id number]]                              In process                 Extra Red Top Tube[[id number]]                               In process                 Extra Heparinized Syringe[[id number]]                        In process                   Please view results for these tests on the individual orders.       Medications - No data to display      Assessments & Plan (with Medical Decision Making)   48-year-old male with what he describes to be as recurrent symptoms of mild  "left neck discomfort, left shoulder pain, and left fourth and fifth paresthesias.  No falls.  No headache.  No weakness.  No difficulty speaking or walking.  Work-up as above.  Troponin is undetectable.  EKG is unchanged.  The patient requested discharge home as he said \"I just want to make sure it was not my heart.\"  We had a long detailed discussion.  I do not believe is secondary to vertebral artery dissection, CVA, or similar presentation.  He is having no chest discomfort or chest pressure.  Do not believe that imaging of the chest is required at this time.  He was advised to follow-up in the clinic this week for recheck.  He was advised return to this emergency department for any new or worsening symptoms.  The patient voiced understanding and was agreeable.    This document was prepared using a combination of typing and voice generated software.  While every attempt was made for accuracy, spelling and grammatical errors may exist.     I have reviewed the nursing notes.    I have reviewed the findings, diagnosis, plan and need for follow up with the patient.           Medical Decision Making  The patient's presentation was of moderate complexity (an undiagnosed new problem with uncertain diagnosis).    The patient's evaluation involved:  review of 3+ test result(s) ordered prior to this encounter (multiple previous labs and ER visits and clinic visit.)  strong consideration of a test (CT neck and head) that was ultimately deferred  ordering and/or review of 3+ test(s) in this encounter (multiple labs and EKG)    The patient's management necessitated only low risk treatment.        New Prescriptions    No medications on file       Final diagnoses:   Left hand paresthesia   Left shoulder pain       9/11/2023   [facility]       Brooke Guzman PA-C  09/11/23 1932    "

## 2023-09-11 NOTE — ED NOTES
Patient presents ambulatory to the ER for evaluation of left neck, arm numbness and tingling for a couple days. Patient reports associated nausea, fatigue. Pain worsened with activity yesterday. Has a cardiac history

## 2023-09-11 NOTE — ED TRIAGE NOTES
Reports left shoulder discomfort with tingling that radiates into the left hand. Reports Hx of PVCs and has had this discomfort before, was told it may be related to his heart when he was seen in the past. Came on when he was walking at 1630 today and has decreased in severity since. Some SOB and nausea.

## 2023-09-12 NOTE — DISCHARGE INSTRUCTIONS
Your symptoms are consistent with cervical radiculopathy involving the C8 distribution.  Feel free to research this online.  Your EKG and troponin level today ruled out any significant heart etiology.  Rest and stay hydrated.  Follow-up in the clinic.  Please return here for any new or worsening symptoms.

## 2023-09-13 NOTE — DISCHARGE INSTRUCTIONS
Colonoscopy: What to Expect at Home  Your Recovery  After a colonoscopy, you'll stay at the clinic until you wake up. Then you can go home. But you'll need to arrange for a ride. Your doctor will tell you when you can eat and do your other usual activities.  Your doctor will talk to you about when you'll need your next colonoscopy. Your doctor can help you decide how often you need to be checked. This will depend on the results of your test and your risk for colorectal cancer.  After the test, you may be bloated or have gas pains. You may need to pass gas. If a biopsy was done or a polyp was removed, you may have streaks of blood in your stool (feces) for a few days. Problems such as heavy rectal bleeding may not occur until several weeks after the test. This isn't common. But it can happen after polyps are removed.  This care sheet gives you a general idea about how long it will take for you to recover. But each person recovers at a different pace. Follow the steps below to get better as quickly as possible.  How can you care for yourself at home?  Activity    Rest when you feel tired.     You can do your normal activities when it feels okay to do so.   Diet    Follow your doctor's directions for eating.     Unless your doctor has told you not to, drink plenty of fluids. This helps to replace the fluids that were lost during the colon prep.     Do not drink alcohol.   Medicines    Your doctor will tell you if and when you can restart your medicines. You will also be given instructions about taking any new medicines.     If you stopped taking aspirin or some other blood thinner, your doctor will tell you when to start taking it again.     If polyps were removed or a biopsy was done during the test, your doctor may tell you not to take aspirin or other anti-inflammatory medicines for a few days. These include ibuprofen (Advil, Motrin) and naproxen (Aleve).   Other instructions    For your safety, do not drive or  "operate machinery until the medicine wears off and you can think clearly. Your doctor may tell you not to drive or operate machinery until the day after your test.     Do not sign legal documents or make major decisions until the medicine wears off and you can think clearly. The anesthesia can make it hard for you to fully understand what you are agreeing to.   Follow-up care is a key part of your treatment and safety. Be sure to make and go to all appointments, and call your doctor if you are having problems. It's also a good idea to know your test results and keep a list of the medicines you take.  When should you call for help?   Call 911 anytime you think you may need emergency care. For example, call if:    You passed out (lost consciousness).     You pass maroon or bloody stools.     You have trouble breathing.   Call your doctor now or seek immediate medical care if:    You have pain that does not get better after you take pain medicine.     You are sick to your stomach or cannot drink fluids.     You have new or worse belly pain.     You have blood in your stools.     You have a fever.     You cannot pass stools or gas.   Watch closely for changes in your health, and be sure to contact your doctor if you have any problems.  Where can you learn more?  Go to https://www.[employer].net/patiented  Enter E264 in the search box to learn more about \"Colonoscopy: What to Expect at Home.\"  Current as of: March 1, 2023               Content Version: 13.7    1507-4777 [employer].   Care instructions adapted under license by your healthcare professional. If you have questions about a medical condition or this instruction, always ask your healthcare professional. [employer] disclaims any warranty or liability for your use of this information.        Post-Anesthesia Patient Instructions    IMMEDIATELY FOLLOWING SURGERY:  Do not drive or operate machinery for the first twenty four hours after " surgery.  Do not make any important decisions for twenty four hours after surgery or while taking narcotic pain medications or sedatives.  If you develop intractable nausea and vomiting or a severe headache please notify your doctor immediately.    FOLLOW-UP:  Please make an appointment with your surgeon as instructed. You do not need to follow up with anesthesia unless specifically instructed to do so.    WOUND CARE INSTRUCTIONS (if applicable):  Keep a dry clean dressing on the anesthesia/puncture wound site if there is drainage.  Once the wound has quit draining you may leave it open to air.  Generally you should leave the bandage intact for twenty four hours unless there is drainage.  If the epidural site drains for more than 36-48 hours please call the anesthesia department.    QUESTIONS?:  Please feel free to call your physician or the hospital  if you have any questions, and they will be happy to assist you.

## 2023-09-14 ENCOUNTER — ANESTHESIA (OUTPATIENT)
Dept: SURGERY | Facility: HOSPITAL | Age: 48
End: 2023-09-14
Payer: COMMERCIAL

## 2023-09-14 ENCOUNTER — ANESTHESIA EVENT (OUTPATIENT)
Dept: SURGERY | Facility: HOSPITAL | Age: 48
End: 2023-09-14
Payer: COMMERCIAL

## 2023-09-14 ENCOUNTER — HOSPITAL ENCOUNTER (OUTPATIENT)
Facility: HOSPITAL | Age: 48
Discharge: HOME | End: 2023-09-14
Attending: SURGERY | Admitting: SURGERY
Payer: COMMERCIAL

## 2023-09-14 VITALS
SYSTOLIC BLOOD PRESSURE: 142 MMHG | OXYGEN SATURATION: 98 % | TEMPERATURE: 97.5 F | WEIGHT: 184 LBS | RESPIRATION RATE: 16 BRPM | HEIGHT: 65 IN | DIASTOLIC BLOOD PRESSURE: 95 MMHG | HEART RATE: 55 BPM | BODY MASS INDEX: 30.66 KG/M2

## 2023-09-14 PROCEDURE — 999N000141 HC STATISTIC PRE-PROCEDURE NURSING ASSESSMENT: Performed by: SURGERY

## 2023-09-14 PROCEDURE — 370N000017 HC ANESTHESIA TECHNICAL FEE, PER MIN: Performed by: SURGERY

## 2023-09-14 PROCEDURE — 710N000012 HC RECOVERY PHASE 2, PER MINUTE: Performed by: SURGERY

## 2023-09-14 PROCEDURE — 45380 COLONOSCOPY AND BIOPSY: CPT | Performed by: SURGERY

## 2023-09-14 PROCEDURE — 360N000075 HC SURGERY LEVEL 2, PER MIN: Performed by: SURGERY

## 2023-09-14 PROCEDURE — 272N000001 HC OR GENERAL SUPPLY STERILE: Performed by: SURGERY

## 2023-09-14 PROCEDURE — 45380 COLONOSCOPY AND BIOPSY: CPT | Performed by: NURSE ANESTHETIST, CERTIFIED REGISTERED

## 2023-09-14 PROCEDURE — 88305 TISSUE EXAM BY PATHOLOGIST: CPT | Mod: 26 | Performed by: PATHOLOGY

## 2023-09-14 PROCEDURE — 250N000009 HC RX 250: Performed by: NURSE ANESTHETIST, CERTIFIED REGISTERED

## 2023-09-14 PROCEDURE — 88305 TISSUE EXAM BY PATHOLOGIST: CPT | Mod: TC | Performed by: SURGERY

## 2023-09-14 PROCEDURE — 250N000011 HC RX IP 250 OP 636: Performed by: NURSE ANESTHETIST, CERTIFIED REGISTERED

## 2023-09-14 RX ORDER — LIDOCAINE HYDROCHLORIDE 20 MG/ML
INJECTION, SOLUTION INFILTRATION; PERINEURAL PRN
Status: DISCONTINUED | OUTPATIENT
Start: 2023-09-14 | End: 2023-09-14

## 2023-09-14 RX ORDER — OXYCODONE HYDROCHLORIDE 5 MG/1
5 TABLET ORAL
Status: DISCONTINUED | OUTPATIENT
Start: 2023-09-14 | End: 2023-09-14 | Stop reason: HOSPADM

## 2023-09-14 RX ORDER — GLYCOPYRROLATE 0.2 MG/ML
INJECTION, SOLUTION INTRAMUSCULAR; INTRAVENOUS PRN
Status: DISCONTINUED | OUTPATIENT
Start: 2023-09-14 | End: 2023-09-14

## 2023-09-14 RX ORDER — PROPOFOL 10 MG/ML
INJECTION, EMULSION INTRAVENOUS PRN
Status: DISCONTINUED | OUTPATIENT
Start: 2023-09-14 | End: 2023-09-14

## 2023-09-14 RX ADMIN — PROPOFOL 50 MG: 10 INJECTION, EMULSION INTRAVENOUS at 09:14

## 2023-09-14 RX ADMIN — MIDAZOLAM 1 MG: 1 INJECTION INTRAMUSCULAR; INTRAVENOUS at 09:07

## 2023-09-14 RX ADMIN — PROPOFOL 70 MG: 10 INJECTION, EMULSION INTRAVENOUS at 09:08

## 2023-09-14 RX ADMIN — GLYCOPYRROLATE 0.2 MG: 0.2 INJECTION, SOLUTION INTRAMUSCULAR; INTRAVENOUS at 09:15

## 2023-09-14 RX ADMIN — LIDOCAINE HYDROCHLORIDE 80 MG: 20 INJECTION, SOLUTION INFILTRATION; PERINEURAL at 09:07

## 2023-09-14 RX ADMIN — PROPOFOL 30 MG: 10 INJECTION, EMULSION INTRAVENOUS at 09:11

## 2023-09-14 ASSESSMENT — LIFESTYLE VARIABLES: TOBACCO_USE: 1

## 2023-09-14 ASSESSMENT — ACTIVITIES OF DAILY LIVING (ADL): ADLS_ACUITY_SCORE: 35

## 2023-09-14 NOTE — ANESTHESIA CARE TRANSFER NOTE
Patient: Jay Jay Chatman    Procedure: Procedure(s):  COLONOSCOPY WITH POLYPECTOMY       Diagnosis: Encounter for screening colonoscopy [Z12.11]  Diagnosis Additional Information: No value filed.    Anesthesia Type:   MAC     Note:    Oropharynx: oropharynx clear of all foreign objects and spontaneously breathing  Level of Consciousness: drowsy  Oxygen Supplementation: room air    Independent Airway: airway patency satisfactory and stable  Dentition: dentition unchanged  Vital Signs Stable: post-procedure vital signs reviewed and stable  Report to RN Given: handoff report given  Patient transferred to: Phase II    Handoff Report: Identifed the Patient, Identified the Reponsible Provider, Reviewed the pertinent medical history, Discussed the surgical course, Reviewed Intra-OP anesthesia mangement and issues during anesthesia, Set expectations for post-procedure period and Allowed opportunity for questions and acknowledgement of understanding  Vitals:  Vitals Value Taken Time   BP     Temp     Pulse     Resp     SpO2         Electronically Signed By: JASMYNE BUNN CRNA  September 14, 2023  9:22 AM

## 2023-09-14 NOTE — ANESTHESIA PREPROCEDURE EVALUATION
Anesthesia Pre-Procedure Evaluation    Patient: Jay Jay Chatman   MRN: [id number] : 1975        Procedure : Procedure(s):  COLONOSCOPY          Past Medical History:   Diagnosis Date     Acute sinusitis, unspecified 10/09/2006      Past Surgical History:   Procedure Laterality Date     ADENOIDECTOMY  2006     FESS  2010     TONSILLECTOMY  2006     ventilation tubes, bilateral x 2  2006      No Known Allergies   Social History     Tobacco Use     Smoking status: Some Days     Types: Cigarettes     Smokeless tobacco: Never     Tobacco comments:     no passive exposure   Substance Use Topics     Alcohol use: No      Wt Readings from Last 1 Encounters:   23 88.8 kg (195 lb 12.8 oz)        Anesthesia Evaluation   Pt has had prior anesthetic. Type: General.        ROS/MED HX  ENT/Pulmonary:     (+) sleep apnea,          allergic rhinitis,     tobacco use, Current use,                      Neurologic: Comment: Spinal surgery in the past      Cardiovascular:     (+)  hypertension- -   -  - -                                 Previous cardiac testing   Echo: Date: Results:    Stress Test:  Date: Results:    ECG Reviewed:  Date: 23 Results:  Nsr left axis deviation, incomplete RBBB  Cath:  Date: Results:      METS/Exercise Tolerance: >4 METS    Hematologic:  - neg hematologic  ROS     Musculoskeletal:   (+)  arthritis,             GI/Hepatic:     (+)        bowel prep,            Renal/Genitourinary:  - neg Renal ROS     Endo:  - neg endo ROS     Psychiatric/Substance Use:     (+) psychiatric history anxiety       Infectious Disease:  - neg infectious disease ROS     Malignancy:  - neg malignancy ROS     Other:  - neg other ROS          Physical Exam    Airway        Mallampati: II   TM distance: > 3 FB   Neck ROM: full   Mouth opening: > 3 cm    Respiratory Devices and Support         Dental       (+) Multiple crowns, permanant bridges      Cardiovascular   cardiovascular exam normal           Pulmonary   pulmonary exam normal            OUTSIDE LABS:  CBC:   Lab Results   Component Value Date    WBC 8.2 09/11/2023    WBC 7.7 03/31/2021    HGB 15.3 09/11/2023    HGB 17.2 03/31/2021    HCT 45.7 09/11/2023    HCT 51.2 03/31/2021     09/11/2023     03/31/2021     BMP:   Lab Results   Component Value Date     09/11/2023     09/06/2022    POTASSIUM 3.9 09/11/2023    POTASSIUM 3.9 09/06/2022    CHLORIDE 99 09/11/2023    CHLORIDE 104 09/06/2022    CO2 27 09/11/2023    CO2 28 09/06/2022    BUN 12.9 09/11/2023    BUN 10 09/06/2022    CR 0.89 09/11/2023    CR 0.86 09/06/2022    GLC 99 09/11/2023    GLC 90 09/06/2022     COAGS: No results found for: PTT, INR, FIBR  POC: No results found for: BGM, HCG, HCGS  HEPATIC:   Lab Results   Component Value Date    ALBUMIN 3.7 09/06/2022    PROTTOTAL 7.3 09/06/2022    ALT 53 09/06/2022    AST 20 09/06/2022    ALKPHOS 82 09/06/2022    BILITOTAL 0.7 09/06/2022     OTHER:   Lab Results   Component Value Date    LACT 1.3 02/13/2020    A1C 5.5 09/06/2022    LAKSHMI 9.3 09/11/2023    LIPASE 62 (L) 02/13/2020    TSH 1.21 03/31/2021       Anesthesia Plan    ASA Status:  3    NPO Status:  NPO Appropriate    Anesthesia Type: MAC.     - Reason for MAC: straight local not clinically adequate   Induction: Intravenous, Propofol.   Maintenance: Balanced.        Consents    Anesthesia Plan(s) and associated risks, benefits, and realistic alternatives discussed. Questions answered and patient/representative(s) expressed understanding.     - Discussed: Risks, Benefits and Alternatives for BOTH SEDATION and the PROCEDURE were discussed     - Discussed with:  Patient      - Extended Intubation/Ventilatory Support Discussed: No.      - Patient is DNR/DNI Status: No     Use of blood products discussed: No .     Postoperative Care    Pain management: IV analgesics.        Comments:    Other Comments: Risks and benefits of MAC anesthetic discussed including dental damage,  aspiration, loss of airway, conversion to general anesthetic, CV complications, MI, stroke, death. Pt wishes to proceed.             Festus Chew

## 2023-09-14 NOTE — ANESTHESIA POSTPROCEDURE EVALUATION
Patient: Jay Jay Chatman    Procedure: Procedure(s):  COLONOSCOPY WITH POLYPECTOMY       Anesthesia Type:  MAC    Note:  Disposition: Outpatient   Postop Pain Control: Uneventful            Sign Out: Well controlled pain   PONV: No   Neuro/Psych: Uneventful            Sign Out: Acceptable/Baseline neuro status   Airway/Respiratory: Uneventful            Sign Out: Acceptable/Baseline resp. status   CV/Hemodynamics: Uneventful            Sign Out: Acceptable CV status; No obvious hypovolemia; No obvious fluid overload   Other NRE: NONE   DID A NON-ROUTINE EVENT OCCUR? No       Last vitals:  Vitals Value Taken Time   /100 09/14/23 0945   Temp     Pulse 59 09/14/23 0945   Resp 16 09/14/23 0945   SpO2 96 % 09/14/23 0946   Vitals shown include unvalidated device data.    Electronically Signed By: JASMYNE Kat CRNA  September 14, 2023  9:55 AM

## 2023-09-14 NOTE — H&P
HISTORY AND PHYSICAL - ENDOSCOPY   9/14/2023    Patient : Jay Jay Chatman    Planned Procedures : Colonoscopy    This is a 48 year old male with a need for a colonoscopy for Screening colonoscopy.      Last colonoscopy : Never  Family history of colon cancer : NO  Family history of colon polyps : NO  Personal history of colon cancer : NO  Personal history of colon polyps : NO  Rectal bleeding : NO  Changes in bowel habits :NO  Personal history of inflammatory bowel disease : NO    Past Medical History:  Past Medical History:   Diagnosis Date    Acute sinusitis, unspecified 10/09/2006       Past Surgical History:  Past Surgical History:   Procedure Laterality Date    ADENOIDECTOMY  04/2006    FESS  02/2010    TONSILLECTOMY  04/2006    ventilation tubes, bilateral x 2  04/2006       Family History History:  Family History   Problem Relation Age of Onset    Diabetes Mother     Cerebrovascular Disease Mother     Heart Disease Mother         stents    Diabetes Father     Other - See Comments Brother 4        myocardial infarction    Heart Disease Brother         MI    Other - See Comments Sister         kidney problems; status post nephrectomy       History of Tobacco Use:  History   Smoking Status    Some Days    Types: Cigarettes   Smokeless Tobacco    Never       Current Medications:  No current outpatient medications on file.       Allergies:  No Known Allergies    ROS:  Constitutional: negative  Eyes: negative  Ears, nose, mouth, throat, and face: negative  Respiratory: negative  Cardiovascular: negative  Gastrointestinal: negative  Genitourinary:negative  Integument/breast: negative  Hematologic/lymphatic: negative  Musculoskeletal: positive for neck and back problems, positive for arthritis  Neurological: negative  Behavioral/Psych: positive for tobacco use  Endocrine: negative  Allergic/Immunologic: negative    PHYSICAL EXAM:     Vital signs: /96   Pulse 62   Temp 97.5  F (36.4  C) (Tympanic)   Resp 16    "Ht 1.651 m (5' 5\")   Wt 83.5 kg (184 lb)   SpO2 99%   BMI 30.62 kg/m     BMI: Body mass index is 30.62 kg/m .   General: Normal, healthy, cooperative, in no acute distress, alert   Skin: no rashes   Lungs: clear to auscultation   CV: Regular rate and rhythm   Abdominal: non-distended, soft, non-tender to palpation   Extremities: No cyanosis, clubbing or edema noted bilaterally in Upper and Lower Extremities   Neurological: without deficit    Assessment:   48 year old male with need of a colonoscopy for Screening colonoscopy:    Plan:   Will proceed with doing a colonoscopy.      The risks, benefits, and alternatives to the planned procedure were fully discussed with the patient and/or the patient's representative(s). The risks of bleeding, infection, death, missing pathology, the need for additional procedures intra-operatively, the possible need for intra-operative consults, the possible need for transfusion therapy, cardiopulmonary compromise, the possible need for additional surgery for a complication were discussed with the patient and/or the patient's representative(s). The patient's and/or patient's representative(s) questions were addressed and answered. Informed consent was obtained from the patient and/or the patient's representative(s). The patient and/or the patient's representative(s) consent to proceed.    Specific risks:  Risks include but are not limited to bleeding, perforation, missing lesions, need for additional procedures, reaction to anesthesia.  All the patients questions were answered.  The patient consents to proceed.  The procedures will be scheduled.      "

## 2023-09-14 NOTE — OP NOTE
REPORT OF OPERATION  DATE OF PROCEDURE: 9/14/2023    PATIENT: Jay Jay Chatman    SURGERY PERFORMED:   Colonoscopy     with biopsy    without use of cauterized snare    without tattooing    PREOPERATIVE DIAGNOSIS: Screening colonoscopy    POSTOPERATIVE DIAGNOSIS:    Same   Colon mass, Ascending colon   Diverticulosis was identified.   Hemorrhoids  were  identified.    SURGEON: Jez Alvarez MD    ASSISTANTS: None    ANESTHESIA: Monitored Anesthesia Care    COMPLICATIONS: None apparent    TRANSFUSIONS: None     TISSUE TO PATHOLOGY: Polyps from Ascending colon were sent to pathology for pathological diagnosis.    FINDINGS: Colon mass, Ascending colon.  Diverticulosis was identified.  Hemorrhoids were not  identified.    INDICATIONS: This is a 48 year old male in need of a colonoscopy for Screening colonoscopy.  The patient will be taken to the endoscopy suite for that procedure.    DESCRIPTIONS OF PROCEDURE IN DETAIL: After consent was obtained the patient was taken to the endoscopy suite and placed in the left lateral decubitus position.  The patient was identified and the correct patient was confirmed.  Monitored Anesthesia Care was given.  A time out was performed verifying the correct patient and the correct procedure.  The entire operative team was in agreement.  All necessary equipment and supplies were in the room.    Rectal exam was performed and no lesions of the anal canal were noted.  The colonoscope was inserted into the anus and passed without difficulty to the cecum.  The cecum was identified by the ileocecal valve, the coalescence of the tinea and the appendiceal orifice.  Upon withdrawal all walls of the colon were visualized.  A large polyp/mass was  identified at Ascending colon.  Biopsies were taken.  No other polyps were noted.  Tattooing their of the location was not done.  Large colon masses and colitis were not seen.colon  Diverticulosis was seen.  Upon reaching the rectum the scope was  retroflexed and internal hemorrhoids  were  seen.  The scope was straightened back out and removed from the patient.  The patient was then taken to the recovery room in stable condition tolerating the procedure well.      Prep: fair    Withdrawal time was 5 minutes.

## 2023-09-14 NOTE — OR NURSING
Patient and responsible adult given discharge instructions with no questions regarding instructions. Letitia score 20/20. Pain level 2/10, pt states tolerable.  Discharged from unit via walking. Patient discharged to home with spouse. Tolerating PO intake.

## 2023-09-16 LAB
ATRIAL RATE - MUSE: 70 BPM
DIASTOLIC BLOOD PRESSURE - MUSE: NORMAL MMHG
INTERPRETATION ECG - MUSE: NORMAL
P AXIS - MUSE: 48 DEGREES
PR INTERVAL - MUSE: 160 MS
QRS DURATION - MUSE: 94 MS
QT - MUSE: 382 MS
QTC - MUSE: 412 MS
R AXIS - MUSE: -12 DEGREES
SYSTOLIC BLOOD PRESSURE - MUSE: NORMAL MMHG
T AXIS - MUSE: 33 DEGREES
VENTRICULAR RATE- MUSE: 70 BPM

## 2023-09-18 LAB
PATH REPORT.COMMENTS IMP SPEC: NORMAL
PATH REPORT.FINAL DX SPEC: NORMAL
PATH REPORT.GROSS SPEC: NORMAL
PATH REPORT.MICROSCOPIC SPEC OTHER STN: NORMAL
PATH REPORT.RELEVANT HX SPEC: NORMAL
PHOTO IMAGE: NORMAL

## 2023-09-20 ENCOUNTER — OFFICE VISIT (OUTPATIENT)
Dept: SURGERY | Facility: OTHER | Age: 48
End: 2023-09-20
Attending: SURGERY
Payer: COMMERCIAL

## 2023-09-20 ENCOUNTER — PREP FOR PROCEDURE (OUTPATIENT)
Dept: SURGERY | Facility: OTHER | Age: 48
End: 2023-09-20

## 2023-09-20 VITALS
OXYGEN SATURATION: 98 % | HEIGHT: 67 IN | TEMPERATURE: 99 F | RESPIRATION RATE: 14 BRPM | WEIGHT: 183 LBS | SYSTOLIC BLOOD PRESSURE: 120 MMHG | HEART RATE: 75 BPM | DIASTOLIC BLOOD PRESSURE: 86 MMHG | BODY MASS INDEX: 28.72 KG/M2

## 2023-09-20 DIAGNOSIS — Z86.0100 HISTORY OF COLONIC POLYPS: Primary | ICD-10-CM

## 2023-09-20 DIAGNOSIS — D12.2 ADENOMATOUS POLYP OF ASCENDING COLON: Primary | ICD-10-CM

## 2023-09-20 PROCEDURE — 99214 OFFICE O/P EST MOD 30 MIN: CPT | Performed by: SURGERY

## 2023-09-20 RX ORDER — BISACODYL 5 MG/1
10 TABLET, DELAYED RELEASE ORAL SEE ADMIN INSTRUCTIONS
Qty: 4 TABLET | Refills: 0 | Status: ON HOLD | OUTPATIENT
Start: 2023-09-20 | End: 2023-10-17

## 2023-09-20 RX ORDER — NEOMYCIN SULFATE 500 MG/1
TABLET ORAL
Qty: 6 TABLET | Refills: 0 | Status: ON HOLD | OUTPATIENT
Start: 2023-09-20 | End: 2023-10-17

## 2023-09-20 RX ORDER — ERYTHROMYCIN 250 MG/1
TABLET, COATED ORAL
Qty: 12 TABLET | Refills: 0 | Status: ON HOLD | OUTPATIENT
Start: 2023-09-20 | End: 2023-10-17

## 2023-09-20 ASSESSMENT — PAIN SCALES - GENERAL: PAINLEVEL: NO PAIN (0)

## 2023-09-20 NOTE — PROGRESS NOTES
"CLINIC NOTE - POST-OP ENDOSCOPY  9/20/2023    Patient:Jay Jay Chatman    Procedure: Colonoscopy with biopsies    This is a 48 year old male who is 2 weeks s/p Colonoscopy with biopsies.  At the time of endoscopy a large polyp was noted in the ascending colon.  Pathology returned as benign.     Current Medications:  Current Outpatient Medications   Medication Sig Dispense Refill    Cetirizine HCl (ZYRTEC PO) Take 1 tablet by mouth daily as needed 10mg Tab      losartan (COZAAR) 25 MG tablet TAKE 1 TABLET BY MOUTH DAILY 30 tablet 0    sildenafil (VIAGRA) 50 MG tablet Take 1 tablet (50 mg) by mouth daily as needed (Take 1 tab 30-60 minutes prior to sexual intercourse; once daily as needed) 30 tablet 0       Allergies:  No Known Allergies    PHYSICAL EXAM:   Vital signs: /86 (BP Location: Right arm, Cuff Size: Adult Regular)   Pulse 75   Temp 99  F (37.2  C)   Resp 14   Ht 1.689 m (5' 6.5\")   Wt 83 kg (183 lb)   SpO2 98%   BMI 29.09 kg/m     Weight: [unfilled]   BMI: Body mass index is 29.09 kg/m .   General: Normal, healthy, cooperative, in no acute distress, alert   Lungs: clear to auscultation   CV: Regular rate and rhythm without murmer   Abdominal: abdomen is soft without significant tenderness, masses, organomegaly or guarding       PATHOLOGY:  Case Report   Date Value Ref Range Status   09/14/2023   Final    Surgical Pathology Report                         Case: [id number]                                  Authorizing Provider:  Jez Alvarez MD  Collected:           09/14/2023 09:15 AM          Ordering Location:     [facility] Operating [facility]     Received:            09/14/2023 11:59 AM          Pathologist:           Michael Martinez DO                                                         Specimen:    Large Intestine, Colon, Ascending                                                           Final Diagnosis   Date Value Ref Range Status   09/14/2023   Final    A.  Ascending colon, polyp, " polypectomy:  -Tubular adenoma.         ASSESSMENT:    48 year old male who is 2 weeks s/p Colonoscopy with biopsies.  With a large unresectable tubular adenoma in the ascending colon.    PLAN:   Discussed options with him at the end of that discussion we decided on a right hemicolectomy.    The risks, benefits, and alternatives to the planned procedure were fully discussed with the patient and/or the patient's representative(s). The risks of bleeding, infection, death, missing pathology, the need for additional procedures intra-operatively, the possible need for intra-operative consults, the possible need for transfusion therapy, cardiopulmonary compromise, the possible need for additional surgery for a complication were discussed with the patient and/or the patient's representative(s). The patient's and/or patient's representative(s) questions were addressed and answered. Informed consent was obtained from the patient and/or the patient's representative(s). The patient and/or the patient's representative(s) consent to proceed.

## 2023-09-20 NOTE — PATIENT INSTRUCTIONS
Thank you for allowing Dr. Alvarez and our surgical team to participate in your care. Please call our health unit coordinator at [phone] with scheduling questions or the nurse at [phone] with any other questions or concerns.    You have been scheduled for: LAPAROSCOPIC RIGHT SIDED HEMECHOLECTOMY with  on OCTOBER 16TH.   You will use GOLYTELY bowel prep.    SURGERY EDUCATION NURSE TO CALL OCTOBER 10TH 3:00    Please see handout for additional instruction.  You WILL need a pre-operative appointment with your primary care provider.  You may call [phone] or [phone] with any questions.

## 2023-09-29 DIAGNOSIS — I10 BENIGN ESSENTIAL HYPERTENSION: ICD-10-CM

## 2023-09-29 NOTE — TELEPHONE ENCOUNTER
Losartan (Cozaar) 25 MG tablet    Last Written Prescription Date:  09/05/2023  Last Fill Quantity: 30,   # refills: 0  Last Office Visit: 03/24/2023

## 2023-10-02 RX ORDER — LOSARTAN POTASSIUM 25 MG/1
TABLET ORAL
Qty: 30 TABLET | Refills: 5 | Status: ON HOLD | OUTPATIENT
Start: 2023-10-02 | End: 2023-10-17

## 2023-10-06 ENCOUNTER — OFFICE VISIT (OUTPATIENT)
Dept: FAMILY MEDICINE | Facility: OTHER | Age: 48
End: 2023-10-06
Attending: STUDENT IN AN ORGANIZED HEALTH CARE EDUCATION/TRAINING PROGRAM
Payer: COMMERCIAL

## 2023-10-06 VITALS
WEIGHT: 191.1 LBS | TEMPERATURE: 98.6 F | OXYGEN SATURATION: 98 % | HEART RATE: 73 BPM | BODY MASS INDEX: 30.38 KG/M2 | DIASTOLIC BLOOD PRESSURE: 88 MMHG | SYSTOLIC BLOOD PRESSURE: 136 MMHG

## 2023-10-06 DIAGNOSIS — F41.9 ANXIETY: ICD-10-CM

## 2023-10-06 DIAGNOSIS — Z01.818 PREOP GENERAL PHYSICAL EXAM: Primary | ICD-10-CM

## 2023-10-06 DIAGNOSIS — G47.33 OSA (OBSTRUCTIVE SLEEP APNEA): Chronic | ICD-10-CM

## 2023-10-06 DIAGNOSIS — D12.6 COLON ADENOMA: ICD-10-CM

## 2023-10-06 DIAGNOSIS — I10 BENIGN ESSENTIAL HYPERTENSION: ICD-10-CM

## 2023-10-06 LAB
ANION GAP SERPL CALCULATED.3IONS-SCNC: 9 MMOL/L (ref 7–15)
BASO+EOS+MONOS # BLD AUTO: NORMAL 10*3/UL
BASO+EOS+MONOS NFR BLD AUTO: NORMAL %
BASOPHILS # BLD AUTO: 0 10E3/UL (ref 0–0.2)
BASOPHILS NFR BLD AUTO: 0 %
BUN SERPL-MCNC: 17.2 MG/DL (ref 6–20)
CALCIUM SERPL-MCNC: 9.3 MG/DL (ref 8.6–10)
CHLORIDE SERPL-SCNC: 104 MMOL/L (ref 98–107)
CREAT SERPL-MCNC: 0.92 MG/DL (ref 0.67–1.17)
DEPRECATED HCO3 PLAS-SCNC: 27 MMOL/L (ref 22–29)
EGFRCR SERPLBLD CKD-EPI 2021: >90 ML/MIN/1.73M2
EOSINOPHIL # BLD AUTO: 0.2 10E3/UL (ref 0–0.7)
EOSINOPHIL NFR BLD AUTO: 2 %
ERYTHROCYTE [DISTWIDTH] IN BLOOD BY AUTOMATED COUNT: 12.7 % (ref 10–15)
GLUCOSE SERPL-MCNC: 86 MG/DL (ref 70–99)
HCT VFR BLD AUTO: 46.1 % (ref 40–53)
HGB BLD-MCNC: 15.6 G/DL (ref 13.3–17.7)
IMM GRANULOCYTES # BLD: 0 10E3/UL
IMM GRANULOCYTES NFR BLD: 0 %
LYMPHOCYTES # BLD AUTO: 2.5 10E3/UL (ref 0.8–5.3)
LYMPHOCYTES NFR BLD AUTO: 33 %
MCH RBC QN AUTO: 29.1 PG (ref 26.5–33)
MCHC RBC AUTO-ENTMCNC: 33.8 G/DL (ref 31.5–36.5)
MCV RBC AUTO: 86 FL (ref 78–100)
MONOCYTES # BLD AUTO: 0.5 10E3/UL (ref 0–1.3)
MONOCYTES NFR BLD AUTO: 6 %
NEUTROPHILS # BLD AUTO: 4.4 10E3/UL (ref 1.6–8.3)
NEUTROPHILS NFR BLD AUTO: 59 %
NRBC # BLD AUTO: 0 10E3/UL
NRBC BLD AUTO-RTO: 0 /100
PLATELET # BLD AUTO: 227 10E3/UL (ref 150–450)
POTASSIUM SERPL-SCNC: 3.8 MMOL/L (ref 3.4–5.3)
RBC # BLD AUTO: 5.36 10E6/UL (ref 4.4–5.9)
SODIUM SERPL-SCNC: 140 MMOL/L (ref 135–145)
WBC # BLD AUTO: 7.5 10E3/UL (ref 4–11)

## 2023-10-06 PROCEDURE — 85025 COMPLETE CBC W/AUTO DIFF WBC: CPT | Performed by: STUDENT IN AN ORGANIZED HEALTH CARE EDUCATION/TRAINING PROGRAM

## 2023-10-06 PROCEDURE — 36415 COLL VENOUS BLD VENIPUNCTURE: CPT | Performed by: STUDENT IN AN ORGANIZED HEALTH CARE EDUCATION/TRAINING PROGRAM

## 2023-10-06 PROCEDURE — 80048 BASIC METABOLIC PNL TOTAL CA: CPT | Performed by: STUDENT IN AN ORGANIZED HEALTH CARE EDUCATION/TRAINING PROGRAM

## 2023-10-06 PROCEDURE — 99214 OFFICE O/P EST MOD 30 MIN: CPT | Performed by: STUDENT IN AN ORGANIZED HEALTH CARE EDUCATION/TRAINING PROGRAM

## 2023-10-06 RX ORDER — POLYETHYLENE GLYCOL 3350, SODIUM SULFATE ANHYDROUS, SODIUM BICARBONATE, SODIUM CHLORIDE, POTASSIUM CHLORIDE 236; 22.74; 6.74; 5.86; 2.97 G/4L; G/4L; G/4L; G/4L; G/4L
POWDER, FOR SOLUTION ORAL
Status: ON HOLD | COMMUNITY
Start: 2023-09-21 | End: 2023-10-17

## 2023-10-06 ASSESSMENT — PAIN SCALES - GENERAL: PAINLEVEL: NO PAIN (0)

## 2023-10-06 NOTE — PATIENT INSTRUCTIONS
Before Your Surgery     Call your surgeon if there is any change in your health. This includes signs of a cold or flu (such as a sore throat, runny nose, cough, rash, fever, urinary symptoms).   If you take prescribed drugs: Follow your doctor s orders about which medicines to take and which to stop until after surgery.  HOLD supplements one week prior to surgery.   HOLD viagra day of surgery  HOLD losartan day of surgery.   Okay to continue Zoloft day of surgery  Do not smoke, drink alcohol or take over the counter medicine (unless your surgeon or primary care doctor tells you to) for the 24 hours before and after surgery. Smoking can increase your risk of an infection. Nicotine patches are safe to use. NSAIDS like ibuprofen (Advil, Motrin) should be held one day before surgery. Celebrex should be held 3 days before surgery. Naproxen (Aleve) should be held four days before surgery.   Do not take supplements/vitamins day prior and morning of surgery. Some supplements/vitamins can interfere with anesthesia.   Eating and drinking prior to surgery: follow the instructions from your surgeon  Take a shower or bath the night before surgery and morning of surgery. Use the soap your surgeon gave you to gently clean your skin night before and morning of surgery. If you do not have soap from your surgeon, use your regular soap.[facility] patients can receive free Chlorhexidine Gluconate 4% soap for surgery at an outpatient [facility] pharmacy.  Do not shave the surgery site.  Wear clean pajamas and have clean sheets on your bed.  Brush teeth morning of surgery to reduce risk of infection.

## 2023-10-06 NOTE — H&P (VIEW-ONLY)
[facility] [place]  [address]  [place] [address]  Phone: [phone]  Primary Provider: Liliana Braga  Pre-op Performing Provider: EMANUEL DUONG    PREOPERATIVE EVALUATION:  Today's date: 10/6/2023    Jay Jay is a 48 year old male who presents for a preoperative evaluation.      Surgical Information:  Surgery/Procedure: lapararoscopic Right Hemicolectomy  Surgery Location: [facility]  Surgeon: Dr. Jez Alvarez  Surgery Date: 10/16/2023  Time of Surgery: Unknown  Where patient plans to recover: At a TCU (Transitional Care Unit)  Fax number for surgical facility: Note does not need to be faxed, will be available electronically in Epic.    Assessment & Plan     The proposed surgical procedure is considered INTERMEDIATE risk.    Preop general physical exam  Optimized for procedure.   Labs within normal limits; EKG last month stable with no ischemic findings and NSR.   METS >4, no cardiac symptoms.   Has done well with prior surgeries.   - CBC with Platelets & Differential; Future  - Basic metabolic panel; Future  - Basic metabolic panel  - CBC with Platelets & Differential    Colon adenoma  Reason for surgery.     Benign essential hypertension  Stable and well controlled.   Continues on losartan 25mg daily.     NAVIN (obstructive sleep apnea)  Noted.     Anxiety  Pervasive, daily anxiety symptoms leading to occasional panic. Patient and wife interested in medication option. Reviewed options. Discussed risks and benefits. Agreed to trial Zoloft - will start with 25mg x7 days then increase to 50mg. Follow up in one month. Reach out sooner with concerns.   - sertraline (ZOLOFT) 50 MG tablet; Take 0.5 tablets (25 mg) by mouth daily for 7 days, THEN 1 tablet (50 mg) daily for 28 days.        Risks and Recommendations:  The patient has the following additional risks and recommendations for perioperative complications:  Pulmonary:    - Active nicotine user, advised smoking cessation    Antiplatelet or  Anticoagulation Medication Instructions:   - Patient is on no antiplatelet or anticoagulation medications.    Additional Medication Instructions:  Patient is to take all scheduled medications on the day of surgery EXCEPT for modifications listed below:   - ACE/ARB: HOLD on day of surgery (minimum 11 hours for general anesthesia).   - SSRIs, SNRIs, TCAs, Antipsychotics: Continue without modification.    - sildenafil: HOLD for 24 hours.    RECOMMENDATION:  APPROVAL GIVEN to proceed with proposed procedure, without further diagnostic evaluation.    Ordering of each unique test  Prescription drug management      Subjective       HPI related to upcoming procedure: patient underwent screening colonoscopy 9/14/23 and was found to have a large polyp in the ascending colon. Pathology revealed a tubular adenoma that was unresectable at time of colonoscopy. Planning to proceed with right hemicolectomy.     Has been feeling well with no fevers, congestion, cough, or other URI symptoms.   No ischemic cardiac history. Denies any shortness of breath, chest pain, or dyspnea on exertion.   Is able to function at >4 METS. Is very active with stairs daily and no exertional symptoms.   Has had prior surgeries with general anesthesia and did well.           10/6/2023     2:35 PM   Preop Questions   1. Have you ever had a heart attack or stroke? No   2. Have you ever had surgery on your heart or blood vessels, such as a stent placement, a coronary artery bypass, or surgery on an artery in your head, neck, heart, or legs? No   3. Do you have chest pain with activity? No   4. Do you have a history of  heart failure? No   5. Do you currently have a cold, bronchitis or symptoms of other infection? No   6. Do you have a cough, shortness of breath, or wheezing? No   7. Do you or anyone in your family have previous history of blood clots? No   8. Do you or does anyone in your family have a serious bleeding problem such as prolonged bleeding  following surgeries or cuts? No   9. Have you ever had problems with anemia or been told to take iron pills? No   10. Have you had any abnormal blood loss such as black, tarry or bloody stools? No   11. Have you ever had a blood transfusion? No   12. Are you willing to have a blood transfusion if it is medically needed before, during, or after your surgery? Yes   13. Have you or any of your relatives ever had problems with anesthesia? No   14. Do you have sleep apnea, excessive snoring or daytime drowsiness? No   15. Do you have any artifical heart valves or other implanted medical devices like a pacemaker, defibrillator, or continuous glucose monitor? No   16. Do you have artificial joints? No   17. Are you allergic to latex? No     Health Care Directive:  Patient does not have a Health Care Directive or Living Will: Discussed advance care planning with patient; however, patient declined at this time.    Preoperative Review of :   reviewed - no record of controlled substances prescribed.      Status of Chronic Conditions:  HYPERTENSION - Patient has longstanding history of HTN , currently denies any symptoms referable to elevated blood pressure. Specifically denies chest pain, palpitations, dyspnea, orthopnea, PND or peripheral edema. Blood pressure readings have been in normal range. Current medication regimen is as listed below. Patient denies any side effects of medication.     Patient and his wife endorsing increased anxiety. Will get panic symptoms, jittery occasionally. Usually happens a few times per week. Usually is able to calm down. Mind races at night. Trouble relaxing at night.has trouble letting go of thoughts. Strong family history of anxiety. Hydroxyzine for one son, daughter on lamictal.    Review of Systems  Constitutional, neuro, ENT, endocrine, pulmonary, cardiac, gastrointestinal, genitourinary, musculoskeletal, integument and psychiatric systems are negative, except as otherwise  noted.    Patient Active Problem List    Diagnosis Date Noted    Benign essential hypertension 03/11/2020     Priority: Medium    NAVIN (obstructive sleep apnea) 03/11/2020     Priority: Medium    Other chest pain 03/11/2020     Priority: Medium    Left axis deviation 03/11/2020     Priority: Medium    Lumbar disc herniation 05/23/2016     Priority: Medium    Low back pain, left L4-L5 radiculopathy 02/24/2016     Priority: Medium    Seasonal allergic rhinitis 08/25/2015     Priority: Medium      Past Medical History:   Diagnosis Date    Acute sinusitis, unspecified 10/09/2006     Past Surgical History:   Procedure Laterality Date    ADENOIDECTOMY  04/01/2006    COLONOSCOPY N/A 09/14/2023    Procedure: COLONOSCOPY WITH POLYPECTOMY;  Surgeon: Jez Alvarez MD;  Location: [facility] OR    DISKECTOMY, LUMBAR, ADDTNL SP      FESS  02/01/2010    TONSILLECTOMY  04/01/2006    ventilation tubes, bilateral x 2  04/01/2006     Current Outpatient Medications   Medication Sig Dispense Refill    bisacodyl (DULCOLAX) 5 MG EC tablet Take 2 tablets (10 mg) by mouth See Admin Instructions 2 Dulcolax tabs po hs 2 nights before surg, 2 dulcolax tabs po 3pm day before surg, 6pm day before surg 8 oz golytely every 10 min until jug is 1/2 gone refrigerate, 6 hrs before arrival golytely 8 oz q 10 min 4 tablet 0    Cetirizine HCl (ZYRTEC PO) Take 1 tablet by mouth daily as needed 10mg Tab      erythromycin (E-MYCIN) 250 MG tablet Take 4 tablets at 1400, take 4 tablets at 1500, take 4 tablets at 2200 on day prior to surgery. 12 tablet 0    losartan (COZAAR) 25 MG tablet TAKE 1 TABLET BY MOUTH DAILY 30 tablet 5    neomycin (MYCIFRADIN) 500 MG tablet Take 2 tablets at 1400, take 2 tablets at 1500, and take 2 tablets at 2200 on day prior to surgery 6 tablet 0    PEG 3350-KCl-NaBcb-NaCl-NaSulf (POLYETHYLENE GLYCOL) 236 g solution       sertraline (ZOLOFT) 50 MG tablet Take 0.5 tablets (25 mg) by mouth daily for 7 days, THEN 1 tablet (50 mg) daily  for 28 days. 32 tablet 0    sildenafil (VIAGRA) 50 MG tablet Take 1 tablet (50 mg) by mouth daily as needed (Take 1 tab 30-60 minutes prior to sexual intercourse; once daily as needed) 30 tablet 0       No Known Allergies     Social History     Tobacco Use    Smoking status: Some Days     Types: Cigarettes    Smokeless tobacco: Never    Tobacco comments:     no passive exposure   Substance Use Topics    Alcohol use: No     Family History   Problem Relation Age of Onset    Diabetes Mother     Cerebrovascular Disease Mother     Heart Disease Mother         stents    Diabetes Father     Other - See Comments Brother 4        myocardial infarction    Heart Disease Brother         MI    Other - See Comments Sister         kidney problems; status post nephrectomy     History   Drug Use No         Objective     /88 (BP Location: Right arm, Patient Position: Sitting, Cuff Size: Adult Regular)   Pulse 73   Temp 98.6  F (37  C) (Tympanic)   Wt 86.7 kg (191 lb 1.6 oz)   SpO2 98%   BMI 30.38 kg/m      Physical Exam    GENERAL APPEARANCE: healthy, alert and no distress     EYES: EOMI,  PERRL     HENT: ear canals and TM's normal and nose and mouth without ulcers or lesions     NECK: no adenopathy, no asymmetry, masses, or scars and thyroid normal to palpation     RESP: lungs clear to auscultation - no rales, rhonchi or wheezes     CV: regular rates and rhythm, normal S1 S2, no S3 or S4 and no murmur, click or rub; no peripheral edema      ABDOMEN:  soft, nontender, no HSM or masses and bowel sounds normal     MS: extremities normal- no gross deformities noted, no evidence of inflammation in joints, FROM in all extremities.     SKIN: no suspicious lesions or rashes     NEURO: Normal strength and tone, sensory exam grossly normal, mentation intact and speech normal     PSYCH: mentation appears normal. and affect normal/bright     LYMPHATICS: No cervical adenopathy    Recent Labs   Lab Test 09/11/23  1840 09/06/22  1058    HGB 15.3  --      --     137   POTASSIUM 3.9 3.9   CR 0.89 0.86   A1C  --  5.5        Diagnostics:  Recent Results (from the past 24 hour(s))   Basic metabolic panel    Collection Time: 10/06/23  3:15 PM   Result Value Ref Range    Sodium 140 135 - 145 mmol/L    Potassium 3.8 3.4 - 5.3 mmol/L    Chloride 104 98 - 107 mmol/L    Carbon Dioxide (CO2) 27 22 - 29 mmol/L    Anion Gap 9 7 - 15 mmol/L    Urea Nitrogen 17.2 6.0 - 20.0 mg/dL    Creatinine 0.92 0.67 - 1.17 mg/dL    GFR Estimate >90 >60 mL/min/1.73m2    Calcium 9.3 8.6 - 10.0 mg/dL    Glucose 86 70 - 99 mg/dL   CBC with platelets and differential    Collection Time: 10/06/23  3:15 PM   Result Value Ref Range    WBC Count 7.5 4.0 - 11.0 10e3/uL    RBC Count 5.36 4.40 - 5.90 10e6/uL    Hemoglobin 15.6 13.3 - 17.7 g/dL    Hematocrit 46.1 40.0 - 53.0 %    MCV 86 78 - 100 fL    MCH 29.1 26.5 - 33.0 pg    MCHC 33.8 31.5 - 36.5 g/dL    RDW 12.7 10.0 - 15.0 %    Platelet Count 227 150 - 450 10e3/uL    % Neutrophils 59 %    % Lymphocytes 33 %    % Monocytes 6 %    Mids % (Monos, Eos, Basos)      % Eosinophils 2 %    % Basophils 0 %    % Immature Granulocytes 0 %    NRBCs per 100 WBC 0 <1 /100    Absolute Neutrophils 4.4 1.6 - 8.3 10e3/uL    Absolute Lymphocytes 2.5 0.8 - 5.3 10e3/uL    Absolute Monocytes 0.5 0.0 - 1.3 10e3/uL    Mids Abs (Monos, Eos, Basos)      Absolute Eosinophils 0.2 0.0 - 0.7 10e3/uL    Absolute Basophils 0.0 0.0 - 0.2 10e3/uL    Absolute Immature Granulocytes 0.0 <=0.4 10e3/uL    Absolute NRBCs 0.0 10e3/uL      No EKG this visit, completed in the last 90 days.  EKG: appears normal, NSR, normal axis, normal intervals, no acute ST/T changes c/w ischemia, no LVH by voltage criteria    Revised Cardiac Risk Index (RCRI):  The patient has the following serious cardiovascular risks for perioperative complications:   - No serious cardiac risks = 0 points     RCRI Interpretation: 0 points: Class I (very low risk - 0.4% complication rate)          Signed Electronically by: Sosa Miranda MD  Copy of this evaluation report is provided to requesting physician.

## 2023-10-06 NOTE — PROGRESS NOTES
[facility] - [place]  [address]  [place] [address]  Phone: [phone]  Primary Provider: Liliana Braga  Pre-op Performing Provider: EMANUEL DUONG    PREOPERATIVE EVALUATION:  Today's date: 10/6/2023    Jay Jay is a 48 year old male who presents for a preoperative evaluation.      Surgical Information:  Surgery/Procedure: lapararoscopic Right Hemicolectomy  Surgery Location: [facility]  Surgeon: Dr. Jez Alvarez  Surgery Date: 10/16/2023  Time of Surgery: Unknown  Where patient plans to recover: At a TCU (Transitional Care Unit)  Fax number for surgical facility: Note does not need to be faxed, will be available electronically in Epic.    Assessment & Plan     The proposed surgical procedure is considered INTERMEDIATE risk.    Preop general physical exam  Optimized for procedure.   Labs within normal limits; EKG last month stable with no ischemic findings and NSR.   METS >4, no cardiac symptoms.   Has done well with prior surgeries.   - CBC with Platelets & Differential; Future  - Basic metabolic panel; Future  - Basic metabolic panel  - CBC with Platelets & Differential    Colon adenoma  Reason for surgery.     Benign essential hypertension  Stable and well controlled.   Continues on losartan 25mg daily.     NAVIN (obstructive sleep apnea)  Noted.     Anxiety  Pervasive, daily anxiety symptoms leading to occasional panic. Patient and wife interested in medication option. Reviewed options. Discussed risks and benefits. Agreed to trial Zoloft - will start with 25mg x7 days then increase to 50mg. Follow up in one month. Reach out sooner with concerns.   - sertraline (ZOLOFT) 50 MG tablet; Take 0.5 tablets (25 mg) by mouth daily for 7 days, THEN 1 tablet (50 mg) daily for 28 days.        Risks and Recommendations:  The patient has the following additional risks and recommendations for perioperative complications:  Pulmonary:    - Active nicotine user, advised smoking cessation    Antiplatelet or  Anticoagulation Medication Instructions:   - Patient is on no antiplatelet or anticoagulation medications.    Additional Medication Instructions:  Patient is to take all scheduled medications on the day of surgery EXCEPT for modifications listed below:   - ACE/ARB: HOLD on day of surgery (minimum 11 hours for general anesthesia).   - SSRIs, SNRIs, TCAs, Antipsychotics: Continue without modification.    - sildenafil: HOLD for 24 hours.    RECOMMENDATION:  APPROVAL GIVEN to proceed with proposed procedure, without further diagnostic evaluation.    Ordering of each unique test  Prescription drug management      Subjective       HPI related to upcoming procedure: patient underwent screening colonoscopy 9/14/23 and was found to have a large polyp in the ascending colon. Pathology revealed a tubular adenoma that was unresectable at time of colonoscopy. Planning to proceed with right hemicolectomy.     Has been feeling well with no fevers, congestion, cough, or other URI symptoms.   No ischemic cardiac history. Denies any shortness of breath, chest pain, or dyspnea on exertion.   Is able to function at >4 METS. Is very active with stairs daily and no exertional symptoms.   Has had prior surgeries with general anesthesia and did well.           10/6/2023     2:35 PM   Preop Questions   1. Have you ever had a heart attack or stroke? No   2. Have you ever had surgery on your heart or blood vessels, such as a stent placement, a coronary artery bypass, or surgery on an artery in your head, neck, heart, or legs? No   3. Do you have chest pain with activity? No   4. Do you have a history of  heart failure? No   5. Do you currently have a cold, bronchitis or symptoms of other infection? No   6. Do you have a cough, shortness of breath, or wheezing? No   7. Do you or anyone in your family have previous history of blood clots? No   8. Do you or does anyone in your family have a serious bleeding problem such as prolonged bleeding  following surgeries or cuts? No   9. Have you ever had problems with anemia or been told to take iron pills? No   10. Have you had any abnormal blood loss such as black, tarry or bloody stools? No   11. Have you ever had a blood transfusion? No   12. Are you willing to have a blood transfusion if it is medically needed before, during, or after your surgery? Yes   13. Have you or any of your relatives ever had problems with anesthesia? No   14. Do you have sleep apnea, excessive snoring or daytime drowsiness? No   15. Do you have any artifical heart valves or other implanted medical devices like a pacemaker, defibrillator, or continuous glucose monitor? No   16. Do you have artificial joints? No   17. Are you allergic to latex? No     Health Care Directive:  Patient does not have a Health Care Directive or Living Will: Discussed advance care planning with patient; however, patient declined at this time.    Preoperative Review of :   reviewed - no record of controlled substances prescribed.      Status of Chronic Conditions:  HYPERTENSION - Patient has longstanding history of HTN , currently denies any symptoms referable to elevated blood pressure. Specifically denies chest pain, palpitations, dyspnea, orthopnea, PND or peripheral edema. Blood pressure readings have been in normal range. Current medication regimen is as listed below. Patient denies any side effects of medication.     Patient and his wife endorsing increased anxiety. Will get panic symptoms, jittery occasionally. Usually happens a few times per week. Usually is able to calm down. Mind races at night. Trouble relaxing at night.has trouble letting go of thoughts. Strong family history of anxiety. Hydroxyzine for one son, daughter on lamictal.    Review of Systems  Constitutional, neuro, ENT, endocrine, pulmonary, cardiac, gastrointestinal, genitourinary, musculoskeletal, integument and psychiatric systems are negative, except as otherwise  noted.    Patient Active Problem List    Diagnosis Date Noted    Benign essential hypertension 03/11/2020     Priority: Medium    NAVIN (obstructive sleep apnea) 03/11/2020     Priority: Medium    Other chest pain 03/11/2020     Priority: Medium    Left axis deviation 03/11/2020     Priority: Medium    Lumbar disc herniation 05/23/2016     Priority: Medium    Low back pain, left L4-L5 radiculopathy 02/24/2016     Priority: Medium    Seasonal allergic rhinitis 08/25/2015     Priority: Medium      Past Medical History:   Diagnosis Date    Acute sinusitis, unspecified 10/09/2006     Past Surgical History:   Procedure Laterality Date    ADENOIDECTOMY  04/01/2006    COLONOSCOPY N/A 09/14/2023    Procedure: COLONOSCOPY WITH POLYPECTOMY;  Surgeon: Jez Alvarez MD;  Location: [facility] OR    DISKECTOMY, LUMBAR, ADDTNL SP      FESS  02/01/2010    TONSILLECTOMY  04/01/2006    ventilation tubes, bilateral x 2  04/01/2006     Current Outpatient Medications   Medication Sig Dispense Refill    bisacodyl (DULCOLAX) 5 MG EC tablet Take 2 tablets (10 mg) by mouth See Admin Instructions 2 Dulcolax tabs po hs 2 nights before surg, 2 dulcolax tabs po 3pm day before surg, 6pm day before surg 8 oz golytely every 10 min until jug is 1/2 gone refrigerate, 6 hrs before arrival golytely 8 oz q 10 min 4 tablet 0    Cetirizine HCl (ZYRTEC PO) Take 1 tablet by mouth daily as needed 10mg Tab      erythromycin (E-MYCIN) 250 MG tablet Take 4 tablets at 1400, take 4 tablets at 1500, take 4 tablets at 2200 on day prior to surgery. 12 tablet 0    losartan (COZAAR) 25 MG tablet TAKE 1 TABLET BY MOUTH DAILY 30 tablet 5    neomycin (MYCIFRADIN) 500 MG tablet Take 2 tablets at 1400, take 2 tablets at 1500, and take 2 tablets at 2200 on day prior to surgery 6 tablet 0    PEG 3350-KCl-NaBcb-NaCl-NaSulf (POLYETHYLENE GLYCOL) 236 g solution       sertraline (ZOLOFT) 50 MG tablet Take 0.5 tablets (25 mg) by mouth daily for 7 days, THEN 1 tablet (50 mg) daily  for 28 days. 32 tablet 0    sildenafil (VIAGRA) 50 MG tablet Take 1 tablet (50 mg) by mouth daily as needed (Take 1 tab 30-60 minutes prior to sexual intercourse; once daily as needed) 30 tablet 0       No Known Allergies     Social History     Tobacco Use    Smoking status: Some Days     Types: Cigarettes    Smokeless tobacco: Never    Tobacco comments:     no passive exposure   Substance Use Topics    Alcohol use: No     Family History   Problem Relation Age of Onset    Diabetes Mother     Cerebrovascular Disease Mother     Heart Disease Mother         stents    Diabetes Father     Other - See Comments Brother 4        myocardial infarction    Heart Disease Brother         MI    Other - See Comments Sister         kidney problems; status post nephrectomy     History   Drug Use No         Objective     /88 (BP Location: Right arm, Patient Position: Sitting, Cuff Size: Adult Regular)   Pulse 73   Temp 98.6  F (37  C) (Tympanic)   Wt 86.7 kg (191 lb 1.6 oz)   SpO2 98%   BMI 30.38 kg/m      Physical Exam    GENERAL APPEARANCE: healthy, alert and no distress     EYES: EOMI,  PERRL     HENT: ear canals and TM's normal and nose and mouth without ulcers or lesions     NECK: no adenopathy, no asymmetry, masses, or scars and thyroid normal to palpation     RESP: lungs clear to auscultation - no rales, rhonchi or wheezes     CV: regular rates and rhythm, normal S1 S2, no S3 or S4 and no murmur, click or rub; no peripheral edema      ABDOMEN:  soft, nontender, no HSM or masses and bowel sounds normal     MS: extremities normal- no gross deformities noted, no evidence of inflammation in joints, FROM in all extremities.     SKIN: no suspicious lesions or rashes     NEURO: Normal strength and tone, sensory exam grossly normal, mentation intact and speech normal     PSYCH: mentation appears normal. and affect normal/bright     LYMPHATICS: No cervical adenopathy    Recent Labs   Lab Test 09/11/23  1840 09/06/22  1058    HGB 15.3  --      --     137   POTASSIUM 3.9 3.9   CR 0.89 0.86   A1C  --  5.5        Diagnostics:  Recent Results (from the past 24 hour(s))   Basic metabolic panel    Collection Time: 10/06/23  3:15 PM   Result Value Ref Range    Sodium 140 135 - 145 mmol/L    Potassium 3.8 3.4 - 5.3 mmol/L    Chloride 104 98 - 107 mmol/L    Carbon Dioxide (CO2) 27 22 - 29 mmol/L    Anion Gap 9 7 - 15 mmol/L    Urea Nitrogen 17.2 6.0 - 20.0 mg/dL    Creatinine 0.92 0.67 - 1.17 mg/dL    GFR Estimate >90 >60 mL/min/1.73m2    Calcium 9.3 8.6 - 10.0 mg/dL    Glucose 86 70 - 99 mg/dL   CBC with platelets and differential    Collection Time: 10/06/23  3:15 PM   Result Value Ref Range    WBC Count 7.5 4.0 - 11.0 10e3/uL    RBC Count 5.36 4.40 - 5.90 10e6/uL    Hemoglobin 15.6 13.3 - 17.7 g/dL    Hematocrit 46.1 40.0 - 53.0 %    MCV 86 78 - 100 fL    MCH 29.1 26.5 - 33.0 pg    MCHC 33.8 31.5 - 36.5 g/dL    RDW 12.7 10.0 - 15.0 %    Platelet Count 227 150 - 450 10e3/uL    % Neutrophils 59 %    % Lymphocytes 33 %    % Monocytes 6 %    Mids % (Monos, Eos, Basos)      % Eosinophils 2 %    % Basophils 0 %    % Immature Granulocytes 0 %    NRBCs per 100 WBC 0 <1 /100    Absolute Neutrophils 4.4 1.6 - 8.3 10e3/uL    Absolute Lymphocytes 2.5 0.8 - 5.3 10e3/uL    Absolute Monocytes 0.5 0.0 - 1.3 10e3/uL    Mids Abs (Monos, Eos, Basos)      Absolute Eosinophils 0.2 0.0 - 0.7 10e3/uL    Absolute Basophils 0.0 0.0 - 0.2 10e3/uL    Absolute Immature Granulocytes 0.0 <=0.4 10e3/uL    Absolute NRBCs 0.0 10e3/uL      No EKG this visit, completed in the last 90 days.  EKG: appears normal, NSR, normal axis, normal intervals, no acute ST/T changes c/w ischemia, no LVH by voltage criteria    Revised Cardiac Risk Index (RCRI):  The patient has the following serious cardiovascular risks for perioperative complications:   - No serious cardiac risks = 0 points     RCRI Interpretation: 0 points: Class I (very low risk - 0.4% complication rate)          Signed Electronically by: Sosa Miarnda MD  Copy of this evaluation report is provided to requesting physician.

## 2023-10-10 NOTE — OR NURSING
Instructed to hold NSAIDs, ASA, vitamins & Supplements starting 10/10, Continue other medications as prescribed up to night before surgery - No medications to take day of surgery.

## 2023-10-11 ENCOUNTER — ANESTHESIA EVENT (OUTPATIENT)
Dept: SURGERY | Facility: HOSPITAL | Age: 48
End: 2023-10-11
Payer: COMMERCIAL

## 2023-10-11 ASSESSMENT — LIFESTYLE VARIABLES: TOBACCO_USE: 1

## 2023-10-11 NOTE — ANESTHESIA PREPROCEDURE EVALUATION
Anesthesia Pre-Procedure Evaluation    Patient: Jay Jay Chatman   MRN: [id number] : 1975        Procedure : Procedure(s):  Laparoscopic Right Hemicolectomy          Past Medical History:   Diagnosis Date     Acute sinusitis, unspecified 10/09/2006      Past Surgical History:   Procedure Laterality Date     ADENOIDECTOMY  2006     COLONOSCOPY N/A 2023    Procedure: COLONOSCOPY WITH POLYPECTOMY;  Surgeon: Jez Alvarez MD;  Location: [facility]     DISKECTOMY, LUMBAR, ADDTNL SP       FESS  2010     TONSILLECTOMY  2006     ventilation tubes, bilateral x 2  2006      No Known Allergies   Social History     Tobacco Use     Smoking status: Some Days     Types: Cigarettes     Smokeless tobacco: Never     Tobacco comments:     no passive exposure   Substance Use Topics     Alcohol use: No      Wt Readings from Last 1 Encounters:   10/06/23 86.7 kg (191 lb 1.6 oz)        Anesthesia Evaluation   Pt has had prior anesthetic.     No history of anesthetic complications       ROS/MED HX  ENT/Pulmonary:     (+) sleep apnea, doesn't use CPAP,         allergic rhinitis,     tobacco use, Current use,                      Neurologic:  - neg neurologic ROS     Cardiovascular:     (+)  hypertension- Peripheral Vascular Disease (ED)-- Other.   -  - -                                 Previous cardiac testing   Echo: Date: Results:    Stress Test:  Date: Results:    ECG Reviewed:  Date: Results:  EKG: appears normal, NSR, normal axis, normal intervals, no acute ST/T changes c/w ischemia, no LVH by voltage criteria  Cath:  Date: Results:      METS/Exercise Tolerance:     Hematologic:  - neg hematologic  ROS     Musculoskeletal: Comment: Low back pain  History of spine surgery - neg musculoskeletal ROS     GI/Hepatic: Comment: Colonoscopy 23 tubular adenoma    (+)        bowel prep,            Renal/Genitourinary:  - neg Renal ROS     Endo:  - neg endo ROS     Psychiatric/Substance Use:     (+)  "psychiatric history anxiety       Infectious Disease:  - neg infectious disease ROS     Malignancy:   (+) Malignancy, History of GI.    Other:  - neg other ROS          Physical Exam    Airway  airway exam normal      Mallampati: III   TM distance: > 3 FB   Neck ROM: full   Mouth opening: > 3 cm    Respiratory Devices and Support         Dental       (+) Minor Abnormalities - some fillings, tiny chips      Cardiovascular   cardiovascular exam normal       Rhythm and rate: regular and normal     Pulmonary   pulmonary exam normal        breath sounds clear to auscultation       OUTSIDE LABS:  CBC:   Lab Results   Component Value Date    WBC 7.5 10/06/2023    WBC 8.2 09/11/2023    HGB 15.6 10/06/2023    HGB 15.3 09/11/2023    HCT 46.1 10/06/2023    HCT 45.7 09/11/2023     10/06/2023     09/11/2023     BMP:   Lab Results   Component Value Date     10/06/2023     09/11/2023    POTASSIUM 3.8 10/06/2023    POTASSIUM 3.9 09/11/2023    CHLORIDE 104 10/06/2023    CHLORIDE 99 09/11/2023    CO2 27 10/06/2023    CO2 27 09/11/2023    BUN 17.2 10/06/2023    BUN 12.9 09/11/2023    CR 0.92 10/06/2023    CR 0.89 09/11/2023    GLC 86 10/06/2023    GLC 99 09/11/2023     COAGS: No results found for: \"PTT\", \"INR\", \"FIBR\"  POC: No results found for: \"BGM\", \"HCG\", \"HCGS\"  HEPATIC:   Lab Results   Component Value Date    ALBUMIN 3.7 09/06/2022    PROTTOTAL 7.3 09/06/2022    ALT 53 09/06/2022    AST 20 09/06/2022    ALKPHOS 82 09/06/2022    BILITOTAL 0.7 09/06/2022     OTHER:   Lab Results   Component Value Date    LACT 1.3 02/13/2020    A1C 5.5 09/06/2022    LAKSHMI 9.3 10/06/2023    LIPASE 62 (L) 02/13/2020    TSH 1.21 03/31/2021       Anesthesia Plan    ASA Status:  3    NPO Status:  NPO Appropriate    Anesthesia Type: General.     - Airway: ETT   Induction: Intravenous, Propofol.   Maintenance: Balanced.        Consents    Anesthesia Plan(s) and associated risks, benefits, and realistic alternatives discussed. " Questions answered and patient/representative(s) expressed understanding.     - Discussed: Risks, Benefits and Alternatives for BOTH SEDATION and the PROCEDURE were discussed     - Discussed with:  Patient      - Extended Intubation/Ventilatory Support Discussed: No.      - Patient is DNR/DNI Status: No     Use of blood products discussed: No .     Postoperative Care    Pain management: Peripheral nerve block (Single Shot).        Comments:    Other Comments: 10/6/23 JASMYNE Lindo CRNA

## 2023-10-16 ENCOUNTER — HOSPITAL ENCOUNTER (INPATIENT)
Facility: HOSPITAL | Age: 48
LOS: 7 days | Discharge: HOME | End: 2023-10-23
Attending: SURGERY | Admitting: SURGERY
Payer: COMMERCIAL

## 2023-10-16 ENCOUNTER — APPOINTMENT (OUTPATIENT)
Dept: ULTRASOUND IMAGING | Facility: HOSPITAL | Age: 48
End: 2023-10-16
Attending: NURSE ANESTHETIST, CERTIFIED REGISTERED
Payer: COMMERCIAL

## 2023-10-16 ENCOUNTER — ANESTHESIA (OUTPATIENT)
Dept: SURGERY | Facility: HOSPITAL | Age: 48
End: 2023-10-16
Payer: COMMERCIAL

## 2023-10-16 DIAGNOSIS — D12.2 ADENOMATOUS POLYP OF ASCENDING COLON: Primary | ICD-10-CM

## 2023-10-16 PROBLEM — K63.5 POLYP OF ASCENDING COLON: Status: ACTIVE | Noted: 2023-10-16

## 2023-10-16 LAB — GLUCOSE BLDC GLUCOMTR-MCNC: 123 MG/DL (ref 70–99)

## 2023-10-16 PROCEDURE — 370N000017 HC ANESTHESIA TECHNICAL FEE, PER MIN: Performed by: SURGERY

## 2023-10-16 PROCEDURE — 88307 TISSUE EXAM BY PATHOLOGIST: CPT | Mod: 26 | Performed by: PATHOLOGY

## 2023-10-16 PROCEDURE — 250N000011 HC RX IP 250 OP 636: Performed by: NURSE ANESTHETIST, CERTIFIED REGISTERED

## 2023-10-16 PROCEDURE — 44140 PARTIAL REMOVAL OF COLON: CPT | Performed by: SURGERY

## 2023-10-16 PROCEDURE — 258N000003 HC RX IP 258 OP 636: Performed by: NURSE ANESTHETIST, CERTIFIED REGISTERED

## 2023-10-16 PROCEDURE — 0DTF4ZZ RESECTION OF RIGHT LARGE INTESTINE, PERCUTANEOUS ENDOSCOPIC APPROACH: ICD-10-PCS | Performed by: SURGERY

## 2023-10-16 PROCEDURE — 360N000077 HC SURGERY LEVEL 4, PER MIN: Performed by: SURGERY

## 2023-10-16 PROCEDURE — 250N000011 HC RX IP 250 OP 636: Mod: JZ | Performed by: NURSE ANESTHETIST, CERTIFIED REGISTERED

## 2023-10-16 PROCEDURE — 250N000009 HC RX 250: Performed by: NURSE ANESTHETIST, CERTIFIED REGISTERED

## 2023-10-16 PROCEDURE — 250N000013 HC RX MED GY IP 250 OP 250 PS 637: Performed by: SURGERY

## 2023-10-16 PROCEDURE — 88307 TISSUE EXAM BY PATHOLOGIST: CPT | Mod: TC | Performed by: SURGERY

## 2023-10-16 PROCEDURE — 710N000010 HC RECOVERY PHASE 1, LEVEL 2, PER MIN: Performed by: SURGERY

## 2023-10-16 PROCEDURE — 120N000001 HC R&B MED SURG/OB

## 2023-10-16 PROCEDURE — 999N000157 HC STATISTIC RCP TIME EA 10 MIN

## 2023-10-16 PROCEDURE — 999N000141 HC STATISTIC PRE-PROCEDURE NURSING ASSESSMENT: Performed by: SURGERY

## 2023-10-16 PROCEDURE — 250N000025 HC SEVOFLURANE, PER MIN: Performed by: SURGERY

## 2023-10-16 PROCEDURE — 250N000011 HC RX IP 250 OP 636: Mod: JZ | Performed by: SURGERY

## 2023-10-16 PROCEDURE — C9290 INJ, BUPIVACAINE LIPOSOME: HCPCS | Performed by: NURSE ANESTHETIST, CERTIFIED REGISTERED

## 2023-10-16 PROCEDURE — 82962 GLUCOSE BLOOD TEST: CPT

## 2023-10-16 PROCEDURE — 258N000003 HC RX IP 258 OP 636: Performed by: SURGERY

## 2023-10-16 PROCEDURE — 44140 PARTIAL REMOVAL OF COLON: CPT | Performed by: NURSE ANESTHETIST, CERTIFIED REGISTERED

## 2023-10-16 PROCEDURE — 272N000001 HC OR GENERAL SUPPLY STERILE: Performed by: SURGERY

## 2023-10-16 RX ORDER — HYDROMORPHONE HYDROCHLORIDE 1 MG/ML
0.4 INJECTION, SOLUTION INTRAMUSCULAR; INTRAVENOUS; SUBCUTANEOUS EVERY 5 MIN PRN
Status: DISCONTINUED | OUTPATIENT
Start: 2023-10-16 | End: 2023-10-16 | Stop reason: HOSPADM

## 2023-10-16 RX ORDER — CEFAZOLIN SODIUM/WATER 2 G/20 ML
2 SYRINGE (ML) INTRAVENOUS
Status: COMPLETED | OUTPATIENT
Start: 2023-10-16 | End: 2023-10-16

## 2023-10-16 RX ORDER — LABETALOL 20 MG/4 ML (5 MG/ML) INTRAVENOUS SYRINGE
10
Status: DISCONTINUED | OUTPATIENT
Start: 2023-10-16 | End: 2023-10-16 | Stop reason: HOSPADM

## 2023-10-16 RX ORDER — BUPIVACAINE HYDROCHLORIDE 5 MG/ML
INJECTION, SOLUTION EPIDURAL; INTRACAUDAL
Status: DISCONTINUED
Start: 2023-10-16 | End: 2023-10-16 | Stop reason: HOSPADM

## 2023-10-16 RX ORDER — LOSARTAN POTASSIUM 25 MG/1
25 TABLET ORAL DAILY
Status: DISCONTINUED | OUTPATIENT
Start: 2023-10-16 | End: 2023-10-23 | Stop reason: HOSPADM

## 2023-10-16 RX ORDER — HYDROMORPHONE HYDROCHLORIDE 1 MG/ML
0.2 INJECTION, SOLUTION INTRAMUSCULAR; INTRAVENOUS; SUBCUTANEOUS EVERY 5 MIN PRN
Status: DISCONTINUED | OUTPATIENT
Start: 2023-10-16 | End: 2023-10-16 | Stop reason: HOSPADM

## 2023-10-16 RX ORDER — ONDANSETRON 2 MG/ML
INJECTION INTRAMUSCULAR; INTRAVENOUS PRN
Status: DISCONTINUED | OUTPATIENT
Start: 2023-10-16 | End: 2023-10-16

## 2023-10-16 RX ORDER — HYDROMORPHONE HCL IN WATER/PF 6 MG/30 ML
0.2 PATIENT CONTROLLED ANALGESIA SYRINGE INTRAVENOUS
Status: DISCONTINUED | OUTPATIENT
Start: 2023-10-16 | End: 2023-10-23 | Stop reason: HOSPADM

## 2023-10-16 RX ORDER — ONDANSETRON 4 MG/1
4 TABLET, ORALLY DISINTEGRATING ORAL EVERY 6 HOURS PRN
Status: DISCONTINUED | OUTPATIENT
Start: 2023-10-16 | End: 2023-10-23 | Stop reason: HOSPADM

## 2023-10-16 RX ORDER — DEXAMETHASONE SODIUM PHOSPHATE 4 MG/ML
INJECTION, SOLUTION INTRA-ARTICULAR; INTRALESIONAL; INTRAMUSCULAR; INTRAVENOUS; SOFT TISSUE PRN
Status: DISCONTINUED | OUTPATIENT
Start: 2023-10-16 | End: 2023-10-16

## 2023-10-16 RX ORDER — PROCHLORPERAZINE MALEATE 5 MG
10 TABLET ORAL EVERY 6 HOURS PRN
Status: DISCONTINUED | OUTPATIENT
Start: 2023-10-16 | End: 2023-10-23 | Stop reason: HOSPADM

## 2023-10-16 RX ORDER — BUPIVACAINE HYDROCHLORIDE 5 MG/ML
INJECTION, SOLUTION PERINEURAL PRN
Status: DISCONTINUED | OUTPATIENT
Start: 2023-10-16 | End: 2023-10-16 | Stop reason: HOSPADM

## 2023-10-16 RX ORDER — NALOXONE HYDROCHLORIDE 0.4 MG/ML
0.2 INJECTION, SOLUTION INTRAMUSCULAR; INTRAVENOUS; SUBCUTANEOUS
Status: DISCONTINUED | OUTPATIENT
Start: 2023-10-16 | End: 2023-10-23 | Stop reason: HOSPADM

## 2023-10-16 RX ORDER — FENTANYL CITRATE 50 UG/ML
25 INJECTION, SOLUTION INTRAMUSCULAR; INTRAVENOUS EVERY 5 MIN PRN
Status: DISCONTINUED | OUTPATIENT
Start: 2023-10-16 | End: 2023-10-16 | Stop reason: HOSPADM

## 2023-10-16 RX ORDER — ONDANSETRON 4 MG/1
4 TABLET, ORALLY DISINTEGRATING ORAL EVERY 30 MIN PRN
Status: DISCONTINUED | OUTPATIENT
Start: 2023-10-16 | End: 2023-10-16

## 2023-10-16 RX ORDER — ONDANSETRON 2 MG/ML
4 INJECTION INTRAMUSCULAR; INTRAVENOUS EVERY 30 MIN PRN
Status: DISCONTINUED | OUTPATIENT
Start: 2023-10-16 | End: 2023-10-16

## 2023-10-16 RX ORDER — FAMOTIDINE 20 MG/1
20 TABLET, FILM COATED ORAL 2 TIMES DAILY
Status: DISCONTINUED | OUTPATIENT
Start: 2023-10-16 | End: 2023-10-21 | Stop reason: ALTCHOICE

## 2023-10-16 RX ORDER — LIDOCAINE 40 MG/G
CREAM TOPICAL
Status: DISCONTINUED | OUTPATIENT
Start: 2023-10-16 | End: 2023-10-23 | Stop reason: HOSPADM

## 2023-10-16 RX ORDER — NALOXONE HYDROCHLORIDE 0.4 MG/ML
0.4 INJECTION, SOLUTION INTRAMUSCULAR; INTRAVENOUS; SUBCUTANEOUS
Status: DISCONTINUED | OUTPATIENT
Start: 2023-10-16 | End: 2023-10-23 | Stop reason: HOSPADM

## 2023-10-16 RX ORDER — HYDROMORPHONE HCL IN WATER/PF 6 MG/30 ML
0.4 PATIENT CONTROLLED ANALGESIA SYRINGE INTRAVENOUS
Status: DISCONTINUED | OUTPATIENT
Start: 2023-10-16 | End: 2023-10-23 | Stop reason: HOSPADM

## 2023-10-16 RX ORDER — FENTANYL CITRATE 50 UG/ML
50 INJECTION, SOLUTION INTRAMUSCULAR; INTRAVENOUS EVERY 5 MIN PRN
Status: DISCONTINUED | OUTPATIENT
Start: 2023-10-16 | End: 2023-10-16 | Stop reason: HOSPADM

## 2023-10-16 RX ORDER — BUPIVACAINE HYDROCHLORIDE 2.5 MG/ML
INJECTION, SOLUTION EPIDURAL; INFILTRATION; INTRACAUDAL
Status: DISCONTINUED
Start: 2023-10-16 | End: 2023-10-16 | Stop reason: HOSPADM

## 2023-10-16 RX ORDER — ONDANSETRON 2 MG/ML
4 INJECTION INTRAMUSCULAR; INTRAVENOUS ONCE
Status: COMPLETED | OUTPATIENT
Start: 2023-10-16 | End: 2023-10-16

## 2023-10-16 RX ORDER — ONDANSETRON 2 MG/ML
4 INJECTION INTRAMUSCULAR; INTRAVENOUS EVERY 6 HOURS PRN
Status: DISCONTINUED | OUTPATIENT
Start: 2023-10-16 | End: 2023-10-23 | Stop reason: HOSPADM

## 2023-10-16 RX ORDER — OXYCODONE HYDROCHLORIDE 5 MG/1
5 TABLET ORAL EVERY 4 HOURS PRN
Status: DISCONTINUED | OUTPATIENT
Start: 2023-10-16 | End: 2023-10-23 | Stop reason: HOSPADM

## 2023-10-16 RX ORDER — GLYCOPYRROLATE 0.2 MG/ML
INJECTION, SOLUTION INTRAMUSCULAR; INTRAVENOUS PRN
Status: DISCONTINUED | OUTPATIENT
Start: 2023-10-16 | End: 2023-10-16

## 2023-10-16 RX ORDER — ONDANSETRON 2 MG/ML
4 INJECTION INTRAMUSCULAR; INTRAVENOUS EVERY 30 MIN PRN
Status: DISCONTINUED | OUTPATIENT
Start: 2023-10-16 | End: 2023-10-16 | Stop reason: HOSPADM

## 2023-10-16 RX ORDER — SODIUM CHLORIDE, SODIUM LACTATE, POTASSIUM CHLORIDE, CALCIUM CHLORIDE 600; 310; 30; 20 MG/100ML; MG/100ML; MG/100ML; MG/100ML
INJECTION, SOLUTION INTRAVENOUS CONTINUOUS
Status: DISCONTINUED | OUTPATIENT
Start: 2023-10-16 | End: 2023-10-18

## 2023-10-16 RX ORDER — FENTANYL CITRATE 50 UG/ML
INJECTION, SOLUTION INTRAMUSCULAR; INTRAVENOUS PRN
Status: DISCONTINUED | OUTPATIENT
Start: 2023-10-16 | End: 2023-10-16

## 2023-10-16 RX ORDER — LIDOCAINE HYDROCHLORIDE 20 MG/ML
INJECTION, SOLUTION INFILTRATION; PERINEURAL PRN
Status: DISCONTINUED | OUTPATIENT
Start: 2023-10-16 | End: 2023-10-16

## 2023-10-16 RX ORDER — CEFAZOLIN SODIUM/WATER 2 G/20 ML
2 SYRINGE (ML) INTRAVENOUS SEE ADMIN INSTRUCTIONS
Status: DISCONTINUED | OUTPATIENT
Start: 2023-10-16 | End: 2023-10-16 | Stop reason: HOSPADM

## 2023-10-16 RX ORDER — HYDRALAZINE HYDROCHLORIDE 20 MG/ML
2.5-5 INJECTION INTRAMUSCULAR; INTRAVENOUS EVERY 10 MIN PRN
Status: DISCONTINUED | OUTPATIENT
Start: 2023-10-16 | End: 2023-10-16 | Stop reason: HOSPADM

## 2023-10-16 RX ORDER — ALBUTEROL SULFATE 0.83 MG/ML
2.5 SOLUTION RESPIRATORY (INHALATION) EVERY 4 HOURS PRN
Status: DISCONTINUED | OUTPATIENT
Start: 2023-10-16 | End: 2023-10-16 | Stop reason: HOSPADM

## 2023-10-16 RX ORDER — SODIUM CHLORIDE, SODIUM LACTATE, POTASSIUM CHLORIDE, CALCIUM CHLORIDE 600; 310; 30; 20 MG/100ML; MG/100ML; MG/100ML; MG/100ML
INJECTION, SOLUTION INTRAVENOUS CONTINUOUS
Status: DISCONTINUED | OUTPATIENT
Start: 2023-10-16 | End: 2023-10-16 | Stop reason: HOSPADM

## 2023-10-16 RX ORDER — EPHEDRINE SULFATE 50 MG/ML
INJECTION, SOLUTION INTRAMUSCULAR; INTRAVENOUS; SUBCUTANEOUS PRN
Status: DISCONTINUED | OUTPATIENT
Start: 2023-10-16 | End: 2023-10-16

## 2023-10-16 RX ORDER — ACETAMINOPHEN 325 MG/1
650 TABLET ORAL EVERY 4 HOURS PRN
Status: DISCONTINUED | OUTPATIENT
Start: 2023-10-19 | End: 2023-10-23 | Stop reason: HOSPADM

## 2023-10-16 RX ORDER — LIDOCAINE 40 MG/G
CREAM TOPICAL
Status: DISCONTINUED | OUTPATIENT
Start: 2023-10-16 | End: 2023-10-16 | Stop reason: HOSPADM

## 2023-10-16 RX ORDER — METRONIDAZOLE 500 MG/100ML
500 INJECTION, SOLUTION INTRAVENOUS
Status: COMPLETED | OUTPATIENT
Start: 2023-10-16 | End: 2023-10-16

## 2023-10-16 RX ORDER — BUPIVACAINE HYDROCHLORIDE 2.5 MG/ML
INJECTION, SOLUTION EPIDURAL; INFILTRATION; INTRACAUDAL
Status: COMPLETED | OUTPATIENT
Start: 2023-10-16 | End: 2023-10-16

## 2023-10-16 RX ORDER — PROPOFOL 10 MG/ML
INJECTION, EMULSION INTRAVENOUS PRN
Status: DISCONTINUED | OUTPATIENT
Start: 2023-10-16 | End: 2023-10-16

## 2023-10-16 RX ORDER — OXYCODONE HYDROCHLORIDE 5 MG/1
10 TABLET ORAL EVERY 4 HOURS PRN
Status: DISCONTINUED | OUTPATIENT
Start: 2023-10-16 | End: 2023-10-23 | Stop reason: HOSPADM

## 2023-10-16 RX ORDER — ACETAMINOPHEN 325 MG/1
975 TABLET ORAL EVERY 8 HOURS
Status: COMPLETED | OUTPATIENT
Start: 2023-10-16 | End: 2023-10-19

## 2023-10-16 RX ORDER — ONDANSETRON 4 MG/1
4 TABLET, ORALLY DISINTEGRATING ORAL EVERY 30 MIN PRN
Status: DISCONTINUED | OUTPATIENT
Start: 2023-10-16 | End: 2023-10-16 | Stop reason: HOSPADM

## 2023-10-16 RX ADMIN — METRONIDAZOLE 500 MG: 500 INJECTION, SOLUTION INTRAVENOUS at 07:20

## 2023-10-16 RX ADMIN — FENTANYL CITRATE 50 MCG: 50 INJECTION, SOLUTION INTRAMUSCULAR; INTRAVENOUS at 09:54

## 2023-10-16 RX ADMIN — FAMOTIDINE 20 MG: 20 TABLET, FILM COATED ORAL at 20:08

## 2023-10-16 RX ADMIN — SODIUM CHLORIDE, POTASSIUM CHLORIDE, SODIUM LACTATE AND CALCIUM CHLORIDE: 600; 310; 30; 20 INJECTION, SOLUTION INTRAVENOUS at 18:49

## 2023-10-16 RX ADMIN — FAMOTIDINE 20 MG: 20 TABLET, FILM COATED ORAL at 10:48

## 2023-10-16 RX ADMIN — ONDANSETRON 4 MG: 2 INJECTION INTRAMUSCULAR; INTRAVENOUS at 08:11

## 2023-10-16 RX ADMIN — SERTRALINE HYDROCHLORIDE 50 MG: 50 TABLET ORAL at 10:48

## 2023-10-16 RX ADMIN — ROCURONIUM BROMIDE 10 MG: 10 INJECTION INTRAVENOUS at 07:44

## 2023-10-16 RX ADMIN — ONDANSETRON 4 MG: 2 INJECTION INTRAMUSCULAR; INTRAVENOUS at 07:27

## 2023-10-16 RX ADMIN — SODIUM CHLORIDE, POTASSIUM CHLORIDE, SODIUM LACTATE AND CALCIUM CHLORIDE: 600; 310; 30; 20 INJECTION, SOLUTION INTRAVENOUS at 06:45

## 2023-10-16 RX ADMIN — SUGAMMADEX 200 MG: 100 INJECTION, SOLUTION INTRAVENOUS at 09:02

## 2023-10-16 RX ADMIN — OXYCODONE HYDROCHLORIDE 5 MG: 5 TABLET ORAL at 11:55

## 2023-10-16 RX ADMIN — GLYCOPYRROLATE 0.2 MG: 0.2 INJECTION, SOLUTION INTRAMUSCULAR; INTRAVENOUS at 07:57

## 2023-10-16 RX ADMIN — MIDAZOLAM 2 MG: 1 INJECTION INTRAMUSCULAR; INTRAVENOUS at 07:17

## 2023-10-16 RX ADMIN — ACETAMINOPHEN 975 MG: 325 TABLET, FILM COATED ORAL at 18:44

## 2023-10-16 RX ADMIN — LOSARTAN POTASSIUM 25 MG: 25 TABLET, FILM COATED ORAL at 11:20

## 2023-10-16 RX ADMIN — BUPIVACAINE 20 ML: 13.3 INJECTION, SUSPENSION, LIPOSOMAL INFILTRATION at 07:28

## 2023-10-16 RX ADMIN — BUPIVACAINE HYDROCHLORIDE 60 ML: 2.5 INJECTION, SOLUTION EPIDURAL; INFILTRATION; INTRACAUDAL at 07:28

## 2023-10-16 RX ADMIN — Medication 2 G: at 07:30

## 2023-10-16 RX ADMIN — FENTANYL CITRATE 50 MCG: 50 INJECTION INTRAMUSCULAR; INTRAVENOUS at 07:17

## 2023-10-16 RX ADMIN — FENTANYL CITRATE 50 MCG: 50 INJECTION INTRAMUSCULAR; INTRAVENOUS at 07:44

## 2023-10-16 RX ADMIN — DEXAMETHASONE SODIUM PHOSPHATE 10 MG: 4 INJECTION, SOLUTION INTRA-ARTICULAR; INTRALESIONAL; INTRAMUSCULAR; INTRAVENOUS; SOFT TISSUE at 08:11

## 2023-10-16 RX ADMIN — PROPOFOL 200 MG: 10 INJECTION, EMULSION INTRAVENOUS at 07:44

## 2023-10-16 RX ADMIN — SODIUM CHLORIDE, POTASSIUM CHLORIDE, SODIUM LACTATE AND CALCIUM CHLORIDE: 600; 310; 30; 20 INJECTION, SOLUTION INTRAVENOUS at 08:18

## 2023-10-16 RX ADMIN — Medication 100 MG: at 07:44

## 2023-10-16 RX ADMIN — HYDROMORPHONE HYDROCHLORIDE 0.2 MG: 0.2 INJECTION, SOLUTION INTRAMUSCULAR; INTRAVENOUS; SUBCUTANEOUS at 10:46

## 2023-10-16 RX ADMIN — ROCURONIUM BROMIDE 10 MG: 10 INJECTION INTRAVENOUS at 08:46

## 2023-10-16 RX ADMIN — ROCURONIUM BROMIDE 40 MG: 10 INJECTION INTRAVENOUS at 07:52

## 2023-10-16 RX ADMIN — HYDROMORPHONE HYDROCHLORIDE 0.4 MG: 0.2 INJECTION, SOLUTION INTRAMUSCULAR; INTRAVENOUS; SUBCUTANEOUS at 20:08

## 2023-10-16 RX ADMIN — PHENYLEPHRINE HYDROCHLORIDE 100 MCG: 10 INJECTION INTRAVENOUS at 07:57

## 2023-10-16 RX ADMIN — LIDOCAINE HYDROCHLORIDE 80 MG: 20 INJECTION, SOLUTION INFILTRATION; PERINEURAL at 07:44

## 2023-10-16 RX ADMIN — ACETAMINOPHEN 975 MG: 325 TABLET, FILM COATED ORAL at 10:50

## 2023-10-16 RX ADMIN — SODIUM CHLORIDE, POTASSIUM CHLORIDE, SODIUM LACTATE AND CALCIUM CHLORIDE: 600; 310; 30; 20 INJECTION, SOLUTION INTRAVENOUS at 10:51

## 2023-10-16 RX ADMIN — Medication 12.5 MG: at 08:00

## 2023-10-16 RX ADMIN — FENTANYL CITRATE 50 MCG: 50 INJECTION, SOLUTION INTRAMUSCULAR; INTRAVENOUS at 09:35

## 2023-10-16 RX ADMIN — HYDROMORPHONE HYDROCHLORIDE 0.4 MG: 0.2 INJECTION, SOLUTION INTRAMUSCULAR; INTRAVENOUS; SUBCUTANEOUS at 14:06

## 2023-10-16 ASSESSMENT — ACTIVITIES OF DAILY LIVING (ADL)
ADLS_ACUITY_SCORE: 20
ADLS_ACUITY_SCORE: 20
ADLS_ACUITY_SCORE: 35
ADLS_ACUITY_SCORE: 35
ADLS_ACUITY_SCORE: 20

## 2023-10-16 NOTE — OR NURSING
Face to face report given with opportunity to observe patient. Pain remains 3/10.     Report given to Jose Cowart RN/ Monet Glez   10/16/2023  10:31 AM

## 2023-10-16 NOTE — ANESTHESIA PROCEDURE NOTES
Airway       Patient location during procedure: OR       Procedure Start/Stop Times: 10/16/2023 7:47 AM  Staff -        CRNA: Dianne Enriquez APRN CRNA       Performed By: CRNA  Consent for Airway        Urgency: elective  Indications and Patient Condition       Indications for airway management: shravan-procedural and airway protection       Induction type:intravenous       Mask difficulty assessment: 1 - vent by mask    Final Airway Details       Final airway type: endotracheal airway       Successful airway: ETT - single  Endotracheal Airway Details        ETT size (mm): 7.5       Cuffed: yes       Cuff volume (mL): 5       Successful intubation technique: video laryngoscopy       VL Blade Size: Momin 3       Grade View of Cords: 2       Adjucts: stylet       Position: Right       Measured from: gums/teeth       Secured at (cm): 23       Bite block used: None    Post intubation assessment        Placement verified by: capnometry, equal breath sounds and chest rise        Number of attempts at approach: 2       Number of other approaches attempted: 1 (see note)       Secured with: plastic tape       Ease of procedure: easy       Dentition: Intact and Unchanged    Medication(s) Administered   Medication Administration Time: 10/16/2023 7:47 AM    Additional Comments       1st attempt garcia 2 blade, CL grade 2b view, immediately changed to Momin 3, 2a view changed to grade 1 view with cricoid, ETT passed open, clear cords easily; atraumatic; EASY MASK VENTILATION

## 2023-10-16 NOTE — PLAN OF CARE
Goal Outcome Evaluation:       [facility] Inpatient Admission Note:    Patient admitted to 3210/3210-1 at approximately 1021 via bed accompanied by spouse from surgery . Report received from Monet in SBAR format at 1021 via face to face in room. Patient stayed in bed to bed via stayed in bed. Patient is alert and oriented X 3, reports pain; rates at 4 on 0-10 scale.  Patient oriented to room, unit, hourly rounding, and plan of care. Explained admission packet and patient handbook with patient bill of rights brochure. Will continue to monitor and document as needed.     Inpatient Nursing criteria listed below was met:    Health care directives status obtained and documented: Yes    Patient identifies a surrogate decision maker: Yes If yes, who:Na Contact Information:NA     If initial lactic acid greater than 2.0, repeat lactic acid drawn within one hour of arrival to unit: NA. If no, state reason: NA    Clergy visit ordered if patient requests: N/A    Skin issues/needs documented: Yes    Isolation Patient: no Education given, correct sign in place and documentation row added to PCS:  No    Fall Prevention Yes: Care plan updated, education given and documented, sticker and magnet in place: Yes    Care Plan initiated: Yes    Education Documented (including assessment): Yes    Patient has discharge needs : Yes If yes, please explain:surgery follow up

## 2023-10-16 NOTE — INTERVAL H&P NOTE
"I have reviewed the surgical (or preoperative) H&P that is linked to this encounter, and examined the patient. There are no significant changes    Clinical Conditions Present on Arrival:  Clinically Significant Risk Factors Present on Admission                  # Overweight: Estimated body mass index is 29.21 kg/m  as calculated from the following:    Height as of this encounter: 1.676 m (5' 6\").    Weight as of this encounter: 82.1 kg (181 lb).       "

## 2023-10-16 NOTE — OP NOTE
REPORT OF OPERATION  DATE OF PROCEDURE: 10/16/2023    PATIENT: Jay Jay Chatman    SURGERY PERFORMED: Hand Assisted Laparoscopic Right Hemicolectomy    PREOPERATIVE DIAGNOSIS: Large polyp in the ascending colon    POSTOPERATIVE DIAGNOSIS: Same    SURGEON: Jez Alvarez MD    ASSISTANTS: None    ANESTHESIA: General Endotracheal Anesthesia    COMPLICATIONS: None apparent    TRANSFUSIONS: None    TISSUE TO PATHOLOGY: Right colon to Pathology for pathological diagnoses    FINDINGS: Large polyp was contained within the specimen with the specimen was opened on the back table.    INDICATIONS: This is a 48 year old male with a large polyp found in the ascending colon on recent colonoscopy.  The patient will be taken to the operating room for a hand assisted laparoscopic right hemicolectomy possible open right hemicolectomy    DESCRIPTIONS OF PROCEDURE IN DETAIL: After consent was obtained the patient was taken to the operative suite and zain in the supine position.  The patient was identified and the correct patient was confirmed.  General Endotracheal Anesthesia was administered by anesthesia.  The patient was sterilely prepped and draped in the usual fashion.  A time out was performed verifying the correct patient and the correct procedure.  The entire operative team was in agreement.  All necessary equipment and supplies were in the room.     Through a periumbilical incision, the skin was sharply entered, dissection was taken down to isolation of the fascia.  The fascia was opened and entrance into the abdomen was accomplished.  The hand port was inserted into the abdomen and pneumoperitoneum was obtained.  Two 5 mm trocars were placed in the left and right upper quadrants respectively.  The colon was mobilized along the right white line of Toldt using the LigaSure.  The mobilization was continued from the cecum up the right colic gutter to the hepatic flexure.  The hepatic flexure was then taken down, along with the  gastrocolic ligament to the transverse colon, allowing the colon to be brought down to the hand port.  Any attachments of the distal ileum were also taken down giving full mobilization of the small bowel, right and transverse colon.  The colon was extracorporealized through the hand post.  Points of resection were then determined.  A window was made around the distal ileum and the ileum was divided using a TOMMY-75 stapler.  A window was made around the ascending colon (preservation of the right colic artery) and the colon was divided using a TOMMY-75 stapler.  The intervening mesentery was then taken down using the LigaSure.  Hemostasis was assured.  The specimen was removed, passed off the field, opened on the back table, and the lesion and area of interest was verified to be contained within the specimen.  The specimen was then sent to Pathology for pathologic diagnosis.  Hemostasis was assured.  A side-to-side functional end-to-end anastomosis was created, bringing the distal ileum to the colon using a TOMMY-75 stapler.  The colon was then closed with a TA-60 stapler.  The staple line was imbricated with Lembert stitches of 3-0 silk sutures.  The mesenteric rent was closed with running 3-0 Vicryl suture.  Hemostasis was assured.  The abdomen was irrigated and the irrigant was removed and again hemostasis was assured.  The bowel was placed back into the abdomen.  All instrumentation was removed from the abdomen.  All instrument, needle, and sponge counts were correct x2.  The midline incisions' fascia was closed with looped #1 PDS and all skin incisions were closed using stainless steel staples.  Sterile dressings were applied.  The patient was awakened in the operating room, extubated without difficulty, and taken to the recovery room in stable condition, tolerating the procedure well.

## 2023-10-16 NOTE — ANESTHESIA POSTPROCEDURE EVALUATION
Patient: Jay Jay Chatman    Procedure: Procedure(s):  Laparoscopic Right Hemicolectomy       Anesthesia Type:  General    Note:  Disposition: Admission   Postop Pain Control: Uneventful            Sign Out: Well controlled pain   PONV: No   Neuro/Psych: Uneventful            Sign Out: Acceptable/Baseline neuro status   Airway/Respiratory: Uneventful            Sign Out: Acceptable/Baseline resp. status   CV/Hemodynamics: Uneventful            Sign Out: Acceptable CV status; No obvious hypovolemia; No obvious fluid overload   Other NRE: NONE   DID A NON-ROUTINE EVENT OCCUR? No       Last vitals:  Vitals Value Taken Time   /83 10/16/23 1010   Temp 98.2  F (36.8  C) 10/16/23 1005   Pulse 70 10/16/23 1013   Resp 22 10/16/23 1013   SpO2 95 % 10/16/23 1013   Vitals shown include unfiled device data.    Electronically Signed By: JASMYNE Lemus CRNA  October 16, 2023  11:01 AM

## 2023-10-16 NOTE — ANESTHESIA PROCEDURE NOTES
Erector spinae Procedure Note    Pre-Procedure   Staff -        CRNA: Dianne Enriquez APRN CRNA       Performed By: CRNA       Location: pre-op       Procedure Start/Stop Times: 10/16/2023 7:17 AM and 10/16/2023 7:28 AM       Pre-Anesthestic Checklist: patient identified, IV checked, site marked, risks and benefits discussed, informed consent, monitors and equipment checked, pre-op evaluation, at physician/surgeon's request and post-op pain management  Timeout:       Correct Patient: Yes        Correct Procedure: Yes        Correct Site: Yes        Correct Position: Yes        Correct Laterality: Yes        Site Marked: Yes  Procedure Documentation  Procedure: Erector spinae       Diagnosis: HEMICOLECTOMY       Laterality: bilateral       Patient Position: sitting       Skin prep: Chloraprep       Insertion Site: T6-7, T8-9.       Needle Type: short bevel and insulated       Needle Gauge: 20.        Needle Length (Inches): 4        Ultrasound guided       1. Ultrasound was used to identify targeted nerve, plexus, vascular marker, or fascial plane and place a needle adjacent to it in real-time.       2. Ultrasound was used to visualize the spread of anesthetic in close proximity to the above referenced structure.       3. A permanent image is entered into the patient's record.       4. The visualized anatomic structures appeared normal.       5. There were no apparent abnormal pathologic findings.    Assessment/Narrative         The placement was negative for: blood aspirated, painful injection and site bleeding       Paresthesias: No.       Bolus given via needle..        Secured via.        Insertion/Infusion Method: Single Shot       Complications: none       Injection made incrementally with aspirations every 5 mL.    Medication(s) Administered   Bupivacaine 0.25% PF (Infiltration) - Infiltration   60 mL - 10/16/2023 7:28:00 AM  Bupivacaine liposome (Exparel) 1.3% LA inj susp (Infiltration) - Infiltration   20 mL -  "10/16/2023 7:28:00 AM  Medication Administration Time: 10/16/2023 7:17 AM     Comments:  60ml 0.25 bupivicaine; 15ml at each site diluted with 5ml PF 0.9 NS at each site; 5ml exparel each site       FOR [facility] ONLY:   Pain Team Contact information: please page the Pain Team Via [email or web address]. Search \"Pain\". During daytime hours, please page the attending first. At night please page the resident first.      "

## 2023-10-16 NOTE — OR NURSING
Patient arrives from the OR in a hospital bed.  Patient is sedated.  Patient's IV intact and infusing.  Bedside report received from AMANDEEP Dean and Naya RUELAS.  Patient placed on cardiac monitor, BP monitor and continuous pulse oximetry.  Rest of nursing assessment as charted.

## 2023-10-16 NOTE — ANESTHESIA CARE TRANSFER NOTE
Patient: Jay Jay Chatman    Procedure: Procedure(s):  Laparoscopic Right Hemicolectomy       Diagnosis: History of colonic polyps [Z86.010]  Diagnosis Additional Information: No value filed.    Anesthesia Type:   General     Note:    Oropharynx: oropharynx clear of all foreign objects and spontaneously breathing  Level of Consciousness: drowsy  Oxygen Supplementation: face mask  Level of Supplemental Oxygen (L/min / FiO2): 6  Independent Airway: airway patency satisfactory and stable  Dentition: dentition unchanged  Vital Signs Stable: post-procedure vital signs reviewed and stable  Report to RN Given: handoff report given  Patient transferred to: PACU    Handoff Report: Identifed the Patient, Identified the Reponsible Provider, Reviewed the pertinent medical history, Discussed the surgical course, Reviewed Intra-OP anesthesia mangement and issues during anesthesia, Set expectations for post-procedure period and Allowed opportunity for questions and acknowledgement of understanding      Vitals:  Vitals Value Taken Time   /102 10/16/23 0915   Temp     Pulse 82 10/16/23 0918   Resp 20 10/16/23 0918   SpO2 100 % 10/16/23 0918   Vitals shown include unfiled device data.    Electronically Signed By: JASMYNE BUNN CRNA  October 16, 2023  9:19 AM

## 2023-10-16 NOTE — PLAN OF CARE
"Goal Outcome Evaluation:       Reason for hospital stay:  polyp of ascending colon   Living situation PTA: home with wife  Most recent vitals: /80 (BP Location: Right arm, Patient Position: Semi-Aden's, Cuff Size: Adult Regular)   Pulse 62   Temp 99.1  F (37.3  C) (Tympanic)   Resp 18   Ht 1.676 m (5' 6\")   Wt 82.1 kg (181 lb)   SpO2 97%   BMI 29.21 kg/m      Pain Management:  scheduled tylenol, PRN dilaudid 0.2mg and 0.4mg along with PRN oxycodone 5mg  LOC:  A&O   Cardiac:  apical regular   Respiratory:  room air, lungs clear, capnography in place  GI:  bowel sounds active all 4 quadrants, tender all 4 quadrants   :  sotelo in place  Skin Issues:  aquacel dressings in place - no drainage noted     IVF:  LR 125ml/hr  ABX:  none    Nutrition: clears   ADL's:  assist 1  Ambulation:unknown, pt was only able to dangle legs at bedside   Safety:  call light in place, fall alarm on, bed in low position with wheels locked, room near nurses station      Comments:      Face to face report given with opportunity to observe patient.    Report given to Katerine Mejia RN   10/16/2023  7:33 PM                       "

## 2023-10-16 NOTE — PLAN OF CARE
PACU Respiratory Event Documentation     1) Episodes of Apnea greater than or equal to 10 seconds: 0    2) Bradypnea - less than 8 breaths per minute: 0    3) Pain score on 0 to 10 scale: 3    4) Pain-sedation mismatch (yes or no): yes    5) Repeated 02 desaturation less than 90% (yes or no): no    Anesthesia notified? (yes or no): yes    Any of the above events occuring repeatedly in separate 30 minute intervals may be considered recurrent PACU respiratory events.

## 2023-10-17 ENCOUNTER — TELEPHONE (OUTPATIENT)
Dept: SURGERY | Facility: OTHER | Age: 48
End: 2023-10-17

## 2023-10-17 LAB
ANION GAP SERPL CALCULATED.3IONS-SCNC: 9 MMOL/L (ref 7–15)
BASO+EOS+MONOS # BLD AUTO: ABNORMAL 10*3/UL
BASO+EOS+MONOS NFR BLD AUTO: ABNORMAL %
BASOPHILS # BLD AUTO: 0 10E3/UL (ref 0–0.2)
BASOPHILS NFR BLD AUTO: 0 %
BUN SERPL-MCNC: 10.9 MG/DL (ref 6–20)
CALCIUM SERPL-MCNC: 9.4 MG/DL (ref 8.6–10)
CHLORIDE SERPL-SCNC: 102 MMOL/L (ref 98–107)
CREAT SERPL-MCNC: 0.81 MG/DL (ref 0.67–1.17)
DEPRECATED HCO3 PLAS-SCNC: 28 MMOL/L (ref 22–29)
EGFRCR SERPLBLD CKD-EPI 2021: >90 ML/MIN/1.73M2
EOSINOPHIL # BLD AUTO: 0 10E3/UL (ref 0–0.7)
EOSINOPHIL NFR BLD AUTO: 0 %
ERYTHROCYTE [DISTWIDTH] IN BLOOD BY AUTOMATED COUNT: 12.5 % (ref 10–15)
GLUCOSE BLDC GLUCOMTR-MCNC: 97 MG/DL (ref 70–99)
GLUCOSE SERPL-MCNC: 115 MG/DL (ref 70–99)
HCT VFR BLD AUTO: 44.5 % (ref 40–53)
HGB BLD-MCNC: 15 G/DL (ref 13.3–17.7)
IMM GRANULOCYTES # BLD: 0.1 10E3/UL
IMM GRANULOCYTES NFR BLD: 1 %
LYMPHOCYTES # BLD AUTO: 1.5 10E3/UL (ref 0.8–5.3)
LYMPHOCYTES NFR BLD AUTO: 11 %
MAGNESIUM SERPL-MCNC: 2 MG/DL (ref 1.7–2.3)
MCH RBC QN AUTO: 29 PG (ref 26.5–33)
MCHC RBC AUTO-ENTMCNC: 33.7 G/DL (ref 31.5–36.5)
MCV RBC AUTO: 86 FL (ref 78–100)
MONOCYTES # BLD AUTO: 0.9 10E3/UL (ref 0–1.3)
MONOCYTES NFR BLD AUTO: 7 %
NEUTROPHILS # BLD AUTO: 11.1 10E3/UL (ref 1.6–8.3)
NEUTROPHILS NFR BLD AUTO: 81 %
NRBC # BLD AUTO: 0 10E3/UL
NRBC BLD AUTO-RTO: 0 /100
PHOSPHATE SERPL-MCNC: 2.9 MG/DL (ref 2.5–4.5)
PLATELET # BLD AUTO: 241 10E3/UL (ref 150–450)
POTASSIUM SERPL-SCNC: 4.1 MMOL/L (ref 3.4–5.3)
RBC # BLD AUTO: 5.17 10E6/UL (ref 4.4–5.9)
SODIUM SERPL-SCNC: 139 MMOL/L (ref 135–145)
WBC # BLD AUTO: 13.7 10E3/UL (ref 4–11)

## 2023-10-17 PROCEDURE — 999N000157 HC STATISTIC RCP TIME EA 10 MIN

## 2023-10-17 PROCEDURE — 120N000001 HC R&B MED SURG/OB

## 2023-10-17 PROCEDURE — 250N000013 HC RX MED GY IP 250 OP 250 PS 637: Performed by: SURGERY

## 2023-10-17 PROCEDURE — 258N000003 HC RX IP 258 OP 636: Performed by: SURGERY

## 2023-10-17 PROCEDURE — 80048 BASIC METABOLIC PNL TOTAL CA: CPT | Performed by: SURGERY

## 2023-10-17 PROCEDURE — 83735 ASSAY OF MAGNESIUM: CPT | Performed by: SURGERY

## 2023-10-17 PROCEDURE — 36415 COLL VENOUS BLD VENIPUNCTURE: CPT | Performed by: SURGERY

## 2023-10-17 PROCEDURE — 85025 COMPLETE CBC W/AUTO DIFF WBC: CPT | Performed by: SURGERY

## 2023-10-17 PROCEDURE — 84100 ASSAY OF PHOSPHORUS: CPT | Performed by: SURGERY

## 2023-10-17 RX ORDER — LOSARTAN POTASSIUM 25 MG/1
25 TABLET ORAL EVERY EVENING
COMMUNITY
End: 2024-09-13 | Stop reason: ALTCHOICE

## 2023-10-17 RX ORDER — CETIRIZINE HYDROCHLORIDE 10 MG/1
10 TABLET ORAL DAILY PRN
COMMUNITY

## 2023-10-17 RX ADMIN — ACETAMINOPHEN 975 MG: 325 TABLET, FILM COATED ORAL at 02:29

## 2023-10-17 RX ADMIN — OXYCODONE HYDROCHLORIDE 5 MG: 5 TABLET ORAL at 11:11

## 2023-10-17 RX ADMIN — ACETAMINOPHEN 975 MG: 325 TABLET, FILM COATED ORAL at 10:36

## 2023-10-17 RX ADMIN — FAMOTIDINE 20 MG: 20 TABLET, FILM COATED ORAL at 09:24

## 2023-10-17 RX ADMIN — SODIUM CHLORIDE, POTASSIUM CHLORIDE, SODIUM LACTATE AND CALCIUM CHLORIDE: 600; 310; 30; 20 INJECTION, SOLUTION INTRAVENOUS at 20:01

## 2023-10-17 RX ADMIN — SODIUM CHLORIDE, POTASSIUM CHLORIDE, SODIUM LACTATE AND CALCIUM CHLORIDE: 600; 310; 30; 20 INJECTION, SOLUTION INTRAVENOUS at 11:07

## 2023-10-17 RX ADMIN — LOSARTAN POTASSIUM 25 MG: 25 TABLET, FILM COATED ORAL at 09:24

## 2023-10-17 RX ADMIN — FAMOTIDINE 20 MG: 20 TABLET, FILM COATED ORAL at 21:11

## 2023-10-17 RX ADMIN — ACETAMINOPHEN 975 MG: 325 TABLET, FILM COATED ORAL at 21:11

## 2023-10-17 RX ADMIN — SODIUM CHLORIDE, POTASSIUM CHLORIDE, SODIUM LACTATE AND CALCIUM CHLORIDE: 600; 310; 30; 20 INJECTION, SOLUTION INTRAVENOUS at 06:23

## 2023-10-17 RX ADMIN — OXYCODONE HYDROCHLORIDE 5 MG: 5 TABLET ORAL at 16:28

## 2023-10-17 RX ADMIN — SERTRALINE HYDROCHLORIDE 50 MG: 50 TABLET ORAL at 09:23

## 2023-10-17 ASSESSMENT — ACTIVITIES OF DAILY LIVING (ADL)
HYGIENE/GROOMING: WITH ASSISTANCE
ADLS_ACUITY_SCORE: 34
ADLS_ACUITY_SCORE: 34
ORAL_HYGIENE: INDEPENDENT
ADLS_ACUITY_SCORE: 32
ADLS_ACUITY_SCORE: 20
ADLS_ACUITY_SCORE: 20
ADLS_ACUITY_SCORE: 32
ADLS_ACUITY_SCORE: 20
ADLS_ACUITY_SCORE: 20
ADLS_ACUITY_SCORE: 22
ADLS_ACUITY_SCORE: 20
ADLS_ACUITY_SCORE: 32
ADLS_ACUITY_SCORE: 34

## 2023-10-17 NOTE — PLAN OF CARE
Patient was admitted with polyps of ascending colon and underwent surgery on 10/17/2023he had stable vital signs ,Reported pain of 03 out of a scale of 0-10.Well intake of clear liquids and water of about 4500 mL.He walked twice covering a walking activity 750.Patient need close vital monitoring for changes.    Face to face report given  to oSsa WeinbergRN

## 2023-10-17 NOTE — PLAN OF CARE
Pt is A&O. VSS. Maintaining oxygen saturation on RA. C/O pain in abdomen and reports it feels like cramping and pressure. Pt very reluctant to take pain meds. Did administer one oxycodone 5mg that pt reported gave minimal improvement. Encouraged him to walk in hallway and he did. Also encouraged cold packs but pt declines. Denies passing gas. Abdomen is distended. Both surgical dressings are clean dry and intact. IV has LR infusing at 125 mL/hr. Burrell catheter in place with large output. Clear liquid diet, pt reports increased pain after drinking fluids. SBA. Call light remains within reach.     Face to face report given with opportunity to observe patient.    Report given to Paty Aguila RN   10/17/2023  7:04 PM

## 2023-10-17 NOTE — PLAN OF CARE
"/73 (BP Location: Right arm, Patient Position: Supine, Cuff Size: Adult Regular)   Pulse 57   Temp 98.9  F (37.2  C) (Tympanic)   Resp 18   Ht 1.676 m (5' 6\")   Wt 82.1 kg (181 lb)   SpO2 95%   BMI 29.21 kg/m      Patient is A&O, VSS, max temp 99.9, and he reported pain rated 4-7/10. Scheduled tylenol and PRN dilaudid given. On room air. Lung sounds are clear and equal bilaterally. Abdomen is round, soft, and tender. Bowel sounds are audible & normoactive x4. No reports of nausea or passing gas this shift. Tolerating clear liquids. Abdominal dressings are CDI. Burrlel cath remains in place, output as charted. Rest of assessment as charted. IV infusing LR @ 125 mL/hr. His call light and personal items are within reach, the bed is low & locked, and non-slip footwear are on when he is OOB. Patient calls appropriately and makes his needs known.    Face to face report given with opportunity to observe patient.    Report given to SURAJ Swan RN   10/17/2023  "

## 2023-10-17 NOTE — PROGRESS NOTES
Medical record and Jeronimo Score reviewed. Participated in interdisciplinary rounds.  No apparent needs noted at this time. Care Transitions will remain available if needs arise.      Tori Cunha CM

## 2023-10-17 NOTE — TELEPHONE ENCOUNTER
Jay Jay Colon needs to know estimated time of recovery for claim purposes. Please call at [phone]  Ext [phone]. You can leave it as a message/secure. Thank you.

## 2023-10-17 NOTE — MEDICATION SCRIBE - ADMISSION MEDICATION HISTORY
Medication Scribe Admission Medication History    Admission medication history is complete. The information provided in this note is only as accurate as the sources available at the time of the update.    Information Source(s): [facility] via in-person    Pertinent Information:   Patient manages his own medications and is a good historian.     Changes made to PTA medication list:  Added: None  Deleted: surgical prep meds- therapy completed.   Changed: zoloft- taper completed from 25 mg x 7 days to 50 mg. Pt on 50 mg for a couple of days PTA. Updated time of day pt takes meds.     Medication Affordability:  Not including over the counter (OTC) medications, was there a time in the past 3 months when you did not take your medications as prescribed because of cost?: No    Allergies reviewed with patient and updates made in EHR: yes    Medication History Completed By: Jade Muir 10/17/2023 7:27 AM    PTA Med List   Medication Sig Last Dose    cetirizine (ZYRTEC) 10 MG tablet Take 10 mg by mouth daily as needed for allergies 10/15/2023 at Sunday afternoon    losartan (COZAAR) 25 MG tablet Take 25 mg by mouth every evening 10/15/2023 at afternoon/early evening    sertraline (ZOLOFT) 50 MG tablet Take 50 mg by mouth at bedtime 10/15/2023 at HS    sildenafil (VIAGRA) 50 MG tablet Take 1 tablet (50 mg) by mouth daily as needed (Take 1 tab 30-60 minutes prior to sexual intercourse; once daily as needed) More than a month

## 2023-10-17 NOTE — PLAN OF CARE
Pt afebrile. Bowel active. Dressing intact without drainage. Pt activity encouraged up ambulating 3x this shift. Tolerating clears but did not feel like advancing diet yet. Pt states feels full and pressure. Abd does not appear distended. VSS. Good output with Burrell.     Face to face report given with opportunity to observe patient.    Report given to Jade Weinberg RN   10/17/2023  3:06 PM

## 2023-10-17 NOTE — PROGRESS NOTES
INPATIENT ROUNDING NOTE  10/17/2023    Patient: Jay Jay Chatman    Physician of Record: Jez Alvarez MD    Admitting diagnosis: History of colonic polyps [Z86.010]  Polyp of ascending colon [K63.5]    Procedure(s):  Laparoscopic Right Hemicolectomy     POD: 1 Day Post-Op    Current Diet: Clear liquids    CURRENT MEDICATIONS:  Continuous Medications:  Current Facility-Administered Medications   Medication Last Rate    lactated ringers 125 mL/hr at 10/17/23 0623       Scheduled Medications:  Current Facility-Administered Medications   Medication Dose Route Frequency    acetaminophen  975 mg Oral Q8H    famotidine  20 mg Oral BID    Or    famotidine  20 mg Intravenous BID    losartan  25 mg Oral Daily    sertraline  50 mg Oral Daily    sodium chloride (PF)  3 mL Intracatheter Q8H       PRN Medications:  Current Facility-Administered Medications   Medication Dose Route Frequency    [START ON 10/19/2023] acetaminophen  650 mg Oral Q4H PRN    HYDROmorphone  0.2 mg Intravenous Q2H PRN    Or    HYDROmorphone  0.4 mg Intravenous Q2H PRN    lidocaine 4%   Topical Q1H PRN    lidocaine (buffered or not buffered)  0.1-1 mL Other Q1H PRN    naloxone  0.2 mg Intravenous Q2 Min PRN    Or    naloxone  0.4 mg Intravenous Q2 Min PRN    Or    naloxone  0.2 mg Intramuscular Q2 Min PRN    Or    naloxone  0.4 mg Intramuscular Q2 Min PRN    ondansetron  4 mg Oral Q6H PRN    Or    ondansetron  4 mg Intravenous Q6H PRN    oxyCODONE  5 mg Oral Q4H PRN    Or    oxyCODONE  10 mg Oral Q4H PRN    prochlorperazine  10 mg Intravenous Q6H PRN    Or    prochlorperazine  10 mg Oral Q6H PRN    sodium chloride (PF)  3 mL Intracatheter q1 min prn       SUBJECTIVE:   Nausea: No. Vomiting: No. Fever: No. Chills: No. Excessive burping: No. Flatus: No. BM: No. Pain control: good. Tolerating current diet: Yes.      PHYSICAL EXAM:   Vital signs: /88 (BP Location: Left arm, Patient Position: Semi-Aden's, Cuff Size: Adult Regular)   Pulse 54   Temp 98.6  " F (37  C)   Resp 20   Ht 1.676 m (5' 6\")   Wt 82.1 kg (181 lb)   SpO2 95%   BMI 29.21 kg/m     BMI: Body mass index is 29.21 kg/m .   General: Normal, healthy, cooperative, in no acute distress, alert   Lungs: respirations are non-labored   Abdominal: non-distended   Extremities: No cyanosis, clubbing or edema noted bilaterally in Upper and Lower Extremities   Neurological: without deficit    INPUT/OUTPUT:      Intake/Output Summary (Last 24 hours) at 10/17/2023 0929  Last data filed at 10/17/2023 0822  Gross per 24 hour   Intake 2675 ml   Output 5325 ml   Net -2650 ml       I/O last 3 completed shifts:  In: 3975 [P.O.:240; I.V.:3735]  Out: 4900 [Urine:4900]    LABS:    Last CBC Rrsults:   Recent Labs   Lab Test 10/17/23  0550 10/06/23  1515 09/11/23  1840   WBC 13.7* 7.5 8.2   RBC 5.17 5.36 5.29   HGB 15.0 15.6 15.3   HCT 44.5 46.1 45.7   MCV 86 86 86   MCH 29.0 29.1 28.9   MCHC 33.7 33.8 33.5   RDW 12.5 12.7 12.9    227 224       Last Comprehensive Metabolic panel:  Recent Labs   Lab Test 10/17/23  0911 10/17/23  0550 10/16/23  0629 10/06/23  1515 09/11/23  1840 09/06/22  1058 09/06/22  1058 03/31/21  0934 02/13/20  1259   NA  --  139  --  140 136   < > 137 137 138   POTASSIUM  --  4.1  --  3.8 3.9  --  3.9 3.7 3.4   CHLORIDE  --  102  --  104 99  --  104 103 104   CO2  --  28  --  27 27  --  28 30 27   ANIONGAP  --  9  --  9 10  --  5 4 7   GLC 97 115* 123* 86 99   < > 90 92 108*   BUN  --  10.9  --  17.2 12.9  --  10 12 11   CR  --  0.81  --  0.92 0.89   < > 0.86 0.85 0.97   GFRESTIMATED  --  >90  --  >90 >90   < > >90 >90 >90   LAKSHMI  --  9.4  --  9.3 9.3   < > 9.0 8.9 8.8   BILITOTAL  --   --   --   --   --   --  0.7 0.5 0.4   ALKPHOS  --   --   --   --   --   --  82 82 88   ALT  --   --   --   --   --   --  53 51 49   AST  --   --   --   --   --   --  20 19 18    < > = values in this interval not displayed.       Recent Labs   Lab Test 10/17/23  0550 09/06/22  1058 03/31/21  0934 02/13/20  1259 "   MAG 2.0  --   --   --    ALBUMIN  --  3.7 3.8 3.9   PHOS 2.9  --   --   --        ASSESSMENT:    1 Day Post-Op from Procedure(s):  Laparoscopic Right Hemicolectomy.      PLAN:  Up ambulating

## 2023-10-18 LAB
ANION GAP SERPL CALCULATED.3IONS-SCNC: 10 MMOL/L (ref 7–15)
BASO+EOS+MONOS # BLD AUTO: ABNORMAL 10*3/UL
BASO+EOS+MONOS NFR BLD AUTO: ABNORMAL %
BASOPHILS # BLD AUTO: 0 10E3/UL (ref 0–0.2)
BASOPHILS NFR BLD AUTO: 0 %
BUN SERPL-MCNC: 11.9 MG/DL (ref 6–20)
CALCIUM SERPL-MCNC: 9.3 MG/DL (ref 8.6–10)
CHLORIDE SERPL-SCNC: 100 MMOL/L (ref 98–107)
CREAT SERPL-MCNC: 0.77 MG/DL (ref 0.67–1.17)
DEPRECATED HCO3 PLAS-SCNC: 26 MMOL/L (ref 22–29)
EGFRCR SERPLBLD CKD-EPI 2021: >90 ML/MIN/1.73M2
EOSINOPHIL # BLD AUTO: 0 10E3/UL (ref 0–0.7)
EOSINOPHIL NFR BLD AUTO: 0 %
ERYTHROCYTE [DISTWIDTH] IN BLOOD BY AUTOMATED COUNT: 12.4 % (ref 10–15)
GLUCOSE SERPL-MCNC: 105 MG/DL (ref 70–99)
HCT VFR BLD AUTO: 43.5 % (ref 40–53)
HGB BLD-MCNC: 14.6 G/DL (ref 13.3–17.7)
IMM GRANULOCYTES # BLD: 0 10E3/UL
IMM GRANULOCYTES NFR BLD: 0 %
LYMPHOCYTES # BLD AUTO: 2 10E3/UL (ref 0.8–5.3)
LYMPHOCYTES NFR BLD AUTO: 15 %
MAGNESIUM SERPL-MCNC: 1.8 MG/DL (ref 1.7–2.3)
MCH RBC QN AUTO: 28.9 PG (ref 26.5–33)
MCHC RBC AUTO-ENTMCNC: 33.6 G/DL (ref 31.5–36.5)
MCV RBC AUTO: 86 FL (ref 78–100)
MONOCYTES # BLD AUTO: 0.8 10E3/UL (ref 0–1.3)
MONOCYTES NFR BLD AUTO: 7 %
NEUTROPHILS # BLD AUTO: 10.1 10E3/UL (ref 1.6–8.3)
NEUTROPHILS NFR BLD AUTO: 78 %
NRBC # BLD AUTO: 0 10E3/UL
NRBC BLD AUTO-RTO: 0 /100
PHOSPHATE SERPL-MCNC: 2.4 MG/DL (ref 2.5–4.5)
PLATELET # BLD AUTO: 209 10E3/UL (ref 150–450)
POTASSIUM SERPL-SCNC: 3.9 MMOL/L (ref 3.4–5.3)
RBC # BLD AUTO: 5.05 10E6/UL (ref 4.4–5.9)
SODIUM SERPL-SCNC: 136 MMOL/L (ref 135–145)
WBC # BLD AUTO: 13 10E3/UL (ref 4–11)

## 2023-10-18 PROCEDURE — 258N000003 HC RX IP 258 OP 636: Performed by: SURGERY

## 2023-10-18 PROCEDURE — 85025 COMPLETE CBC W/AUTO DIFF WBC: CPT | Performed by: SURGERY

## 2023-10-18 PROCEDURE — 250N000011 HC RX IP 250 OP 636: Mod: JZ | Performed by: SURGERY

## 2023-10-18 PROCEDURE — 250N000011 HC RX IP 250 OP 636: Mod: JZ | Performed by: NURSE PRACTITIONER

## 2023-10-18 PROCEDURE — 83735 ASSAY OF MAGNESIUM: CPT | Performed by: SURGERY

## 2023-10-18 PROCEDURE — 84100 ASSAY OF PHOSPHORUS: CPT | Performed by: SURGERY

## 2023-10-18 PROCEDURE — 250N000013 HC RX MED GY IP 250 OP 250 PS 637: Performed by: SURGERY

## 2023-10-18 PROCEDURE — 80048 BASIC METABOLIC PNL TOTAL CA: CPT | Performed by: SURGERY

## 2023-10-18 PROCEDURE — 36415 COLL VENOUS BLD VENIPUNCTURE: CPT | Performed by: SURGERY

## 2023-10-18 PROCEDURE — 999N000157 HC STATISTIC RCP TIME EA 10 MIN

## 2023-10-18 PROCEDURE — 120N000001 HC R&B MED SURG/OB

## 2023-10-18 RX ORDER — ENOXAPARIN SODIUM 100 MG/ML
40 INJECTION SUBCUTANEOUS EVERY 24 HOURS
Status: DISCONTINUED | OUTPATIENT
Start: 2023-10-18 | End: 2023-10-23 | Stop reason: HOSPADM

## 2023-10-18 RX ADMIN — ACETAMINOPHEN 975 MG: 325 TABLET, FILM COATED ORAL at 05:12

## 2023-10-18 RX ADMIN — SODIUM CHLORIDE, POTASSIUM CHLORIDE, SODIUM LACTATE AND CALCIUM CHLORIDE: 600; 310; 30; 20 INJECTION, SOLUTION INTRAVENOUS at 02:11

## 2023-10-18 RX ADMIN — PROCHLORPERAZINE EDISYLATE 10 MG: 5 INJECTION INTRAMUSCULAR; INTRAVENOUS at 16:45

## 2023-10-18 RX ADMIN — ENOXAPARIN SODIUM 40 MG: 40 INJECTION SUBCUTANEOUS at 09:54

## 2023-10-18 RX ADMIN — POTASSIUM & SODIUM PHOSPHATES POWDER PACK 280-160-250 MG 1 PACKET: 280-160-250 PACK at 09:56

## 2023-10-18 RX ADMIN — LOSARTAN POTASSIUM 25 MG: 25 TABLET, FILM COATED ORAL at 09:54

## 2023-10-18 RX ADMIN — SERTRALINE HYDROCHLORIDE 50 MG: 50 TABLET ORAL at 09:54

## 2023-10-18 RX ADMIN — FAMOTIDINE 20 MG: 20 TABLET, FILM COATED ORAL at 20:19

## 2023-10-18 RX ADMIN — POTASSIUM & SODIUM PHOSPHATES POWDER PACK 280-160-250 MG 1 PACKET: 280-160-250 PACK at 14:59

## 2023-10-18 RX ADMIN — FAMOTIDINE 20 MG: 20 TABLET, FILM COATED ORAL at 09:54

## 2023-10-18 RX ADMIN — ONDANSETRON 4 MG: 2 INJECTION INTRAMUSCULAR; INTRAVENOUS at 13:05

## 2023-10-18 ASSESSMENT — ACTIVITIES OF DAILY LIVING (ADL)
ADLS_ACUITY_SCORE: 34

## 2023-10-18 NOTE — PLAN OF CARE
Vital signs stable. Alert and oriented x3. Passed flatus at the beginning of the shift. Stopped IV infusion and removed sotelo catheter per verbal order form the surgeon. Has voided since removal of catheter. Bowel sounds active. Dressing intact without drainage  Ambulated independently for 550 ft. Declined cold packs. Had complains of nausea towards the end of the shift.    Face to face report given with opportunity to observe patient.    Report given to Jade Mojica   10/18/2023  1:20 PM

## 2023-10-18 NOTE — PROGRESS NOTES
INPATIENT ROUNDING NOTE  10/18/2023    Patient: Jay Jay Chatman    Physician of Record: Jez Alvarez MD    Admitting diagnosis: History of colonic polyps [Z86.010]  Polyp of ascending colon [K63.5]    Procedure(s):  Laparoscopic Right Hemicolectomy     POD: 2 Days Post-Op    Current Diet: Clear liquids    CURRENT MEDICATIONS:  Continuous Medications:  Current Facility-Administered Medications   Medication Last Rate       Scheduled Medications:  Current Facility-Administered Medications   Medication Dose Route Frequency    acetaminophen  975 mg Oral Q8H    enoxaparin ANTICOAGULANT  40 mg Subcutaneous Q24H    famotidine  20 mg Oral BID    Or    famotidine  20 mg Intravenous BID    losartan  25 mg Oral Daily    potassium & sodium phosphates  1 packet Oral or Feeding Tube Q4H    sertraline  50 mg Oral Daily    sodium chloride (PF)  3 mL Intracatheter Q8H       PRN Medications:  Current Facility-Administered Medications   Medication Dose Route Frequency    [START ON 10/19/2023] acetaminophen  650 mg Oral Q4H PRN    HYDROmorphone  0.2 mg Intravenous Q2H PRN    Or    HYDROmorphone  0.4 mg Intravenous Q2H PRN    lidocaine 4%   Topical Q1H PRN    lidocaine (buffered or not buffered)  0.1-1 mL Other Q1H PRN    naloxone  0.2 mg Intravenous Q2 Min PRN    Or    naloxone  0.4 mg Intravenous Q2 Min PRN    Or    naloxone  0.2 mg Intramuscular Q2 Min PRN    Or    naloxone  0.4 mg Intramuscular Q2 Min PRN    ondansetron  4 mg Oral Q6H PRN    Or    ondansetron  4 mg Intravenous Q6H PRN    oxyCODONE  5 mg Oral Q4H PRN    Or    oxyCODONE  10 mg Oral Q4H PRN    prochlorperazine  10 mg Intravenous Q6H PRN    Or    prochlorperazine  10 mg Oral Q6H PRN    sodium chloride (PF)  3 mL Intracatheter q1 min prn       SUBJECTIVE:   Nausea: No. Vomiting: No. Fever: No. Chills: No. Excessive burping: No. Flatus: Yes. BM: No. Pain control: good. Tolerating current diet: Yes.      PHYSICAL EXAM:   Vital signs: /84   Pulse 60   Temp 99.2  F  "(37.3  C) (Tympanic)   Resp 18   Ht 1.676 m (5' 6\")   Wt 82.1 kg (181 lb)   SpO2 94%   BMI 29.21 kg/m     BMI: Body mass index is 29.21 kg/m .   General: Normal, healthy, cooperative, in no acute distress, alert   Lungs: respirations are non-labored   Abdominal: non-distended   Extremities: No cyanosis, clubbing or edema noted bilaterally in Upper and Lower Extremities   Neurological: without deficit    INPUT/OUTPUT:      Intake/Output Summary (Last 24 hours) at 10/18/2023 0851  Last data filed at 10/18/2023 0843  Gross per 24 hour   Intake 4840 ml   Output 4350 ml   Net 490 ml       I/O last 3 completed shifts:  In: 4840 [P.O.:2000; I.V.:2840]  Out: 4525 [Urine:4525]    LABS:    Last CBC Rrsults:   Recent Labs   Lab Test 10/18/23  0538 10/17/23  0550 10/06/23  1515   WBC 13.0* 13.7* 7.5   RBC 5.05 5.17 5.36   HGB 14.6 15.0 15.6   HCT 43.5 44.5 46.1   MCV 86 86 86   MCH 28.9 29.0 29.1   MCHC 33.6 33.7 33.8   RDW 12.4 12.5 12.7    241 227       Last Comprehensive Metabolic panel:  Recent Labs   Lab Test 10/18/23  0538 10/17/23  0911 10/17/23  0550 10/16/23  0629 10/06/23  1515 09/11/23  1840 09/06/22  1058 03/31/21  0934 02/13/20  1259     --  139  --  140   < > 137 137 138   POTASSIUM 3.9  --  4.1  --  3.8   < > 3.9 3.7 3.4   CHLORIDE 100  --  102  --  104   < > 104 103 104   CO2 26  --  28  --  27   < > 28 30 27   ANIONGAP 10  --  9  --  9   < > 5 4 7   * 97 115*   < > 86   < > 90 92 108*   BUN 11.9  --  10.9  --  17.2   < > 10 12 11   CR 0.77  --  0.81  --  0.92   < > 0.86 0.85 0.97   GFRESTIMATED >90  --  >90  --  >90   < > >90 >90 >90   LAKSHMI 9.3  --  9.4  --  9.3   < > 9.0 8.9 8.8   BILITOTAL  --   --   --   --   --   --  0.7 0.5 0.4   ALKPHOS  --   --   --   --   --   --  82 82 88   ALT  --   --   --   --   --   --  53 51 49   AST  --   --   --   --   --   --  20 19 18    < > = values in this interval not displayed.       Recent Labs   Lab Test 10/18/23  0538 10/17/23  0550 " 09/06/22  1058 03/31/21  0934 02/13/20  1259   MAG 1.8 2.0  --   --   --    ALBUMIN  --   --  3.7 3.8 3.9   PHOS 2.4* 2.9  --   --   --        ASSESSMENT:    2 Days Post-Op from Procedure(s):  Laparoscopic Right Hemicolectomy.      PLAN:   Regular diet  Saline Lock IV  Discontinue sotelo  Up ambulating

## 2023-10-18 NOTE — PLAN OF CARE
0839- RN student removed sotelo catheter with this writer. No complications.  Also SL pts IV per verbal order from surgeon in room.

## 2023-10-18 NOTE — PLAN OF CARE
Pt is A&O. Very flat and withdrawn. Wife present in room and is pleasant however she often speaks for pt and questions need to be directed directly at pt. Reports pain at a 3 and declines any medications when offered, even scheduled tylenol. Declines cold packs as well but did get pt to ice abdomen for about 20 min this shift. VSS. Maintaining oxygen saturation on RA. Burrell pulled out today and pt has voided since. IV is SL, pt is drinking a fair amout of fluids as documented. Diet also advance to regular, however pt is not tolerating them well. Became nauseas after having scrambled eggs early in the shift, zofran was given and symptoms subsided. Ordered mashed potatoes and gravy around 1530 and soon after that got the hiccups and vomited. Compazine given and symptoms improved. Encouraged pt to take sips of liquid, continue to walk in the hallway like he has been, use IS, and cough and deep breath while splinting abdomen. Abdomen is distended BS active in all quadrants and pt is passing flatus. Has not had a bowel movement. Pt refused 1700 dose of potassium & sodium phosphates. Up independently.     Face to face report given with opportunity to observe patient.    Report given to Paty Aguila RN   10/18/2023  7:07 PM

## 2023-10-18 NOTE — PLAN OF CARE
"  Most recent vitals: /92 (BP Location: Left arm, Cuff Size: Adult Regular)   Pulse 63   Temp 99.2  F (37.3  C) (Tympanic)   Resp 16   Ht 1.676 m (5' 6\")   Wt 82.1 kg (181 lb)   SpO2 94%   BMI 29.21 kg/m      Pain Management:  c/o pain to abd 2/10 did give scheduled tylenol per MAR  LOC:  A&O, quiet/flat affect  Cardiac:  HRR   Respiratory:  lungs clear, remains on RA  GI:  bowel sounds active, passing flatus towards end of shift, denies N/V, abd distended, soft/tender  :  sotelo in place 1950cc light/pale output  Skin Issues:  dressing to midline and L abd CDI    IVF:  IV infusing LR at 125/hr  ABX:  N/A    Nutrition: clears, tolerating sips of water, POD 2   ADL's:   Ambulation:SBA  Safety:  call light within reach, makes needs known, room near station/door open    Face to face report given with opportunity to observe patient.    Report given to SURAJ Hansen RN   10/18/2023  7:03 AM        "

## 2023-10-19 LAB
ANION GAP SERPL CALCULATED.3IONS-SCNC: 12 MMOL/L (ref 7–15)
BASO+EOS+MONOS # BLD AUTO: ABNORMAL 10*3/UL
BASO+EOS+MONOS NFR BLD AUTO: ABNORMAL %
BASOPHILS # BLD AUTO: 0 10E3/UL (ref 0–0.2)
BASOPHILS NFR BLD AUTO: 0 %
BUN SERPL-MCNC: 14.6 MG/DL (ref 6–20)
CALCIUM SERPL-MCNC: 9.6 MG/DL (ref 8.6–10)
CHLORIDE SERPL-SCNC: 96 MMOL/L (ref 98–107)
CREAT SERPL-MCNC: 0.73 MG/DL (ref 0.67–1.17)
DEPRECATED HCO3 PLAS-SCNC: 24 MMOL/L (ref 22–29)
EGFRCR SERPLBLD CKD-EPI 2021: >90 ML/MIN/1.73M2
EOSINOPHIL # BLD AUTO: 0 10E3/UL (ref 0–0.7)
EOSINOPHIL NFR BLD AUTO: 0 %
ERYTHROCYTE [DISTWIDTH] IN BLOOD BY AUTOMATED COUNT: 12.4 % (ref 10–15)
GLUCOSE SERPL-MCNC: 131 MG/DL (ref 70–99)
HCT VFR BLD AUTO: 47.5 % (ref 40–53)
HGB BLD-MCNC: 15.9 G/DL (ref 13.3–17.7)
IMM GRANULOCYTES # BLD: 0.1 10E3/UL
IMM GRANULOCYTES NFR BLD: 0 %
LYMPHOCYTES # BLD AUTO: 1.4 10E3/UL (ref 0.8–5.3)
LYMPHOCYTES NFR BLD AUTO: 9 %
MAGNESIUM SERPL-MCNC: 1.9 MG/DL (ref 1.7–2.3)
MCH RBC QN AUTO: 28.3 PG (ref 26.5–33)
MCHC RBC AUTO-ENTMCNC: 33.5 G/DL (ref 31.5–36.5)
MCV RBC AUTO: 85 FL (ref 78–100)
MONOCYTES # BLD AUTO: 1.1 10E3/UL (ref 0–1.3)
MONOCYTES NFR BLD AUTO: 7 %
NEUTROPHILS # BLD AUTO: 12.4 10E3/UL (ref 1.6–8.3)
NEUTROPHILS NFR BLD AUTO: 84 %
NRBC # BLD AUTO: 0 10E3/UL
NRBC BLD AUTO-RTO: 0 /100
PATH REPORT.COMMENTS IMP SPEC: NORMAL
PATH REPORT.FINAL DX SPEC: NORMAL
PATH REPORT.GROSS SPEC: NORMAL
PATH REPORT.MICROSCOPIC SPEC OTHER STN: NORMAL
PATH REPORT.RELEVANT HX SPEC: NORMAL
PHOSPHATE SERPL-MCNC: 3.6 MG/DL (ref 2.5–4.5)
PHOTO IMAGE: NORMAL
PLATELET # BLD AUTO: 248 10E3/UL (ref 150–450)
POTASSIUM SERPL-SCNC: 3.8 MMOL/L (ref 3.4–5.3)
RBC # BLD AUTO: 5.61 10E6/UL (ref 4.4–5.9)
SODIUM SERPL-SCNC: 132 MMOL/L (ref 135–145)
WBC # BLD AUTO: 14.9 10E3/UL (ref 4–11)

## 2023-10-19 PROCEDURE — 120N000001 HC R&B MED SURG/OB

## 2023-10-19 PROCEDURE — 83735 ASSAY OF MAGNESIUM: CPT | Performed by: SURGERY

## 2023-10-19 PROCEDURE — 85025 COMPLETE CBC W/AUTO DIFF WBC: CPT | Performed by: SURGERY

## 2023-10-19 PROCEDURE — 250N000013 HC RX MED GY IP 250 OP 250 PS 637: Performed by: SURGERY

## 2023-10-19 PROCEDURE — 250N000011 HC RX IP 250 OP 636: Mod: JZ | Performed by: SURGERY

## 2023-10-19 PROCEDURE — 36415 COLL VENOUS BLD VENIPUNCTURE: CPT | Performed by: SURGERY

## 2023-10-19 PROCEDURE — 80048 BASIC METABOLIC PNL TOTAL CA: CPT | Performed by: SURGERY

## 2023-10-19 PROCEDURE — 250N000011 HC RX IP 250 OP 636: Mod: JZ | Performed by: NURSE PRACTITIONER

## 2023-10-19 PROCEDURE — 84100 ASSAY OF PHOSPHORUS: CPT | Performed by: SURGERY

## 2023-10-19 RX ADMIN — FAMOTIDINE 20 MG: 20 TABLET, FILM COATED ORAL at 20:47

## 2023-10-19 RX ADMIN — FAMOTIDINE 20 MG: 20 TABLET, FILM COATED ORAL at 08:21

## 2023-10-19 RX ADMIN — LOSARTAN POTASSIUM 25 MG: 25 TABLET, FILM COATED ORAL at 08:21

## 2023-10-19 RX ADMIN — SERTRALINE HYDROCHLORIDE 50 MG: 50 TABLET ORAL at 08:21

## 2023-10-19 RX ADMIN — ENOXAPARIN SODIUM 40 MG: 40 INJECTION SUBCUTANEOUS at 08:21

## 2023-10-19 RX ADMIN — ONDANSETRON 4 MG: 2 INJECTION INTRAMUSCULAR; INTRAVENOUS at 08:21

## 2023-10-19 RX ADMIN — OXYCODONE HYDROCHLORIDE 5 MG: 5 TABLET ORAL at 20:47

## 2023-10-19 RX ADMIN — ACETAMINOPHEN 650 MG: 325 TABLET, FILM COATED ORAL at 20:47

## 2023-10-19 RX ADMIN — OXYCODONE HYDROCHLORIDE 10 MG: 5 TABLET ORAL at 14:58

## 2023-10-19 ASSESSMENT — ACTIVITIES OF DAILY LIVING (ADL)
ADLS_ACUITY_SCORE: 36
ADLS_ACUITY_SCORE: 34
ADLS_ACUITY_SCORE: 34
ADLS_ACUITY_SCORE: 36

## 2023-10-19 NOTE — PLAN OF CARE
Patient alert and orientated, following commands, afebrile last temp 99.8. HR 60's to 70's BP remain stable. RA lung sounds clear. Up to the bathroom to void independently. Bowel sounds normoactive and dressings remain clean and dry. Nausea this morning PRN zofran given x1, patient then denied nausea. Tolerated breakfast well this morning, only had about 25% for lunch. 9/10 pain reported in abdomen this afternoon. PRN oxycodone given x1 and patient reported pain 3/10 and stated it was tolerable.     Face to face report given with opportunity to observe patient.    Report given to Yvonne Lopez RN   10/19/2023  7:21 PM

## 2023-10-19 NOTE — PROGRESS NOTES
"INPATIENT ROUNDING NOTE  10/19/2023    Patient: Jay Jay Chatman    Physician of Record: Jez Alvarez MD    Admitting diagnosis: History of colonic polyps [Z86.010]  Polyp of ascending colon [K63.5]    Procedure(s):  Laparoscopic Right Hemicolectomy     POD: 3 Days Post-Op    Current Diet: Regular    CURRENT MEDICATIONS:  Continuous Medications:  Current Facility-Administered Medications   Medication Last Rate       Scheduled Medications:  Current Facility-Administered Medications   Medication Dose Route Frequency    enoxaparin ANTICOAGULANT  40 mg Subcutaneous Q24H    famotidine  20 mg Oral BID    Or    famotidine  20 mg Intravenous BID    losartan  25 mg Oral Daily    sertraline  50 mg Oral Daily    sodium chloride (PF)  3 mL Intracatheter Q8H       PRN Medications:  Current Facility-Administered Medications   Medication Dose Route Frequency    acetaminophen  650 mg Oral Q4H PRN    HYDROmorphone  0.2 mg Intravenous Q2H PRN    Or    HYDROmorphone  0.4 mg Intravenous Q2H PRN    lidocaine 4%   Topical Q1H PRN    lidocaine (buffered or not buffered)  0.1-1 mL Other Q1H PRN    naloxone  0.2 mg Intravenous Q2 Min PRN    Or    naloxone  0.4 mg Intravenous Q2 Min PRN    Or    naloxone  0.2 mg Intramuscular Q2 Min PRN    Or    naloxone  0.4 mg Intramuscular Q2 Min PRN    ondansetron  4 mg Oral Q6H PRN    Or    ondansetron  4 mg Intravenous Q6H PRN    oxyCODONE  5 mg Oral Q4H PRN    Or    oxyCODONE  10 mg Oral Q4H PRN    prochlorperazine  10 mg Intravenous Q6H PRN    Or    prochlorperazine  10 mg Oral Q6H PRN    sodium chloride (PF)  3 mL Intracatheter q1 min prn       SUBJECTIVE:   Nausea: Yes. Vomiting: Yes. Fever: No. Chills: No. Excessive burping: No. Flatus: Yes. BM: Yes. Pain control: good. Tolerating current diet: No.      PHYSICAL EXAM:   Vital signs: /86   Pulse 63   Temp 98.4  F (36.9  C) (Tympanic)   Resp 18   Ht 1.676 m (5' 6\")   Wt 82.1 kg (181 lb)   SpO2 93%   BMI 29.21 kg/m     BMI: Body mass index " is 29.21 kg/m .   General: Normal, healthy, cooperative, in no acute distress, alert   Lungs: respirations are non-labored   Abdominal: non-distended   Wound:  dressings clean, dry, intact   Extremities: No cyanosis, clubbing or edema noted bilaterally in Upper and Lower Extremities   Neurological: without deficit    INPUT/OUTPUT:      Intake/Output Summary (Last 24 hours) at 10/19/2023 1006  Last data filed at 10/19/2023 0822  Gross per 24 hour   Intake 723 ml   Output --   Net 723 ml       I/O last 3 completed shifts:  In: 1413 [P.O.:600; I.V.:813]  Out: 400 [Urine:400]    LABS:    Last CBC Rrsults:   Recent Labs   Lab Test 10/19/23  0552 10/18/23  0538 10/17/23  0550   WBC 14.9* 13.0* 13.7*   RBC 5.61 5.05 5.17   HGB 15.9 14.6 15.0   HCT 47.5 43.5 44.5   MCV 85 86 86   MCH 28.3 28.9 29.0   MCHC 33.5 33.6 33.7   RDW 12.4 12.4 12.5    209 241       Last Comprehensive Metabolic panel:  Recent Labs   Lab Test 10/19/23  0552 10/18/23  0538 10/17/23  0911 10/17/23  0550 09/11/23  1840 09/06/22  1058 03/31/21  0934 02/13/20  1259   * 136  --  139   < > 137 137 138   POTASSIUM 3.8 3.9  --  4.1   < > 3.9 3.7 3.4   CHLORIDE 96* 100  --  102   < > 104 103 104   CO2 24 26  --  28   < > 28 30 27   ANIONGAP 12 10  --  9   < > 5 4 7   * 105* 97 115*   < > 90 92 108*   BUN 14.6 11.9  --  10.9   < > 10 12 11   CR 0.73 0.77  --  0.81   < > 0.86 0.85 0.97   GFRESTIMATED >90 >90  --  >90   < > >90 >90 >90   LAKSHMI 9.6 9.3  --  9.4   < > 9.0 8.9 8.8   BILITOTAL  --   --   --   --   --  0.7 0.5 0.4   ALKPHOS  --   --   --   --   --  82 82 88   ALT  --   --   --   --   --  53 51 49   AST  --   --   --   --   --  20 19 18    < > = values in this interval not displayed.       Recent Labs   Lab Test 10/19/23  0552 10/18/23  0538 10/17/23  0550 09/06/22  1058 03/31/21  0934 02/13/20  1259   MAG 1.9 1.8 2.0  --   --   --    ALBUMIN  --   --   --  3.7 3.8 3.9   PHOS 3.6 2.4* 2.9  --   --   --        ASSESSMENT:    3 Days  Post-Op from Procedure(s):  Laparoscopic Right Hemicolectomy.      PLAN:   Patient will work on slowly advancing his diet today.  If able to tolerate po intake, possible discharge home tomorrow.  Will continue to monitor white blood cell count and will recheck tomorrow.

## 2023-10-19 NOTE — PLAN OF CARE
"  Most recent vitals: BP (!) 152/102 (BP Location: Left arm, Cuff Size: Adult Regular)   Pulse 71   Temp 99  F (37.2  C) (Tympanic)   Resp 18   Ht 1.676 m (5' 6\")   Wt 82.1 kg (181 lb)   SpO2 97%   BMI 29.21 kg/m      Pain Management:  c/o pain to abd 2/10 refuse scheduled tylenol  LOC:  A&O, flat affect  Cardiac:  HRR  Respiratory:  lungs clear, remains on RA  GI:  bowel sounds active, did have 1 episode of incont loose BM as charted, passing flatus, hiccups, abd distended, did c/o intermit nausea no vomiting  :  upto bathroom independently per pt upto bathroom couple times this shift  Skin Issues:  dressing to midline/L abd CDI    IVF:  IV SL  ABX:  N/A    Nutrition: reg diet, tolerating sips of water, did have broth overnight and tolerated ok  ADL's:  independent  Ambulation:independent  Safety:  Call light within reach, makes needs known    Face to face report given with opportunity to observe patient.    Report given to SURAJ Desai RN   10/19/2023  7:08 AM          "

## 2023-10-20 ENCOUNTER — APPOINTMENT (OUTPATIENT)
Dept: GENERAL RADIOLOGY | Facility: HOSPITAL | Age: 48
End: 2023-10-20
Attending: LEGAL MEDICINE
Payer: COMMERCIAL

## 2023-10-20 LAB
ANION GAP SERPL CALCULATED.3IONS-SCNC: 12 MMOL/L (ref 7–15)
BASO+EOS+MONOS # BLD AUTO: ABNORMAL 10*3/UL
BASO+EOS+MONOS NFR BLD AUTO: ABNORMAL %
BASOPHILS # BLD AUTO: 0 10E3/UL (ref 0–0.2)
BASOPHILS NFR BLD AUTO: 0 %
BUN SERPL-MCNC: 22.2 MG/DL (ref 6–20)
C DIFF TOX B STL QL: NEGATIVE
CALCIUM SERPL-MCNC: 9.7 MG/DL (ref 8.6–10)
CHLORIDE SERPL-SCNC: 95 MMOL/L (ref 98–107)
CREAT SERPL-MCNC: 0.76 MG/DL (ref 0.67–1.17)
DEPRECATED HCO3 PLAS-SCNC: 29 MMOL/L (ref 22–29)
EGFRCR SERPLBLD CKD-EPI 2021: >90 ML/MIN/1.73M2
EOSINOPHIL # BLD AUTO: 0 10E3/UL (ref 0–0.7)
EOSINOPHIL NFR BLD AUTO: 0 %
ERYTHROCYTE [DISTWIDTH] IN BLOOD BY AUTOMATED COUNT: 12.7 % (ref 10–15)
GLUCOSE SERPL-MCNC: 139 MG/DL (ref 70–99)
HCT VFR BLD AUTO: 48.2 % (ref 40–53)
HGB BLD-MCNC: 16.4 G/DL (ref 13.3–17.7)
IMM GRANULOCYTES # BLD: 0 10E3/UL
IMM GRANULOCYTES NFR BLD: 0 %
LYMPHOCYTES # BLD AUTO: 0.8 10E3/UL (ref 0.8–5.3)
LYMPHOCYTES NFR BLD AUTO: 8 %
MAGNESIUM SERPL-MCNC: 2.1 MG/DL (ref 1.7–2.3)
MCH RBC QN AUTO: 28.6 PG (ref 26.5–33)
MCHC RBC AUTO-ENTMCNC: 34 G/DL (ref 31.5–36.5)
MCV RBC AUTO: 84 FL (ref 78–100)
MONOCYTES # BLD AUTO: 1.1 10E3/UL (ref 0–1.3)
MONOCYTES NFR BLD AUTO: 10 %
NEUTROPHILS # BLD AUTO: 8.4 10E3/UL (ref 1.6–8.3)
NEUTROPHILS NFR BLD AUTO: 82 %
NRBC # BLD AUTO: 0 10E3/UL
NRBC BLD AUTO-RTO: 0 /100
PHOSPHATE SERPL-MCNC: 4 MG/DL (ref 2.5–4.5)
PLATELET # BLD AUTO: 241 10E3/UL (ref 150–450)
POTASSIUM SERPL-SCNC: 3.6 MMOL/L (ref 3.4–5.3)
RBC # BLD AUTO: 5.74 10E6/UL (ref 4.4–5.9)
SODIUM SERPL-SCNC: 136 MMOL/L (ref 135–145)
WBC # BLD AUTO: 10.4 10E3/UL (ref 4–11)

## 2023-10-20 PROCEDURE — 84100 ASSAY OF PHOSPHORUS: CPT | Performed by: SURGERY

## 2023-10-20 PROCEDURE — 36415 COLL VENOUS BLD VENIPUNCTURE: CPT | Performed by: SURGERY

## 2023-10-20 PROCEDURE — 250N000013 HC RX MED GY IP 250 OP 250 PS 637: Performed by: SURGERY

## 2023-10-20 PROCEDURE — 80048 BASIC METABOLIC PNL TOTAL CA: CPT | Performed by: SURGERY

## 2023-10-20 PROCEDURE — 250N000011 HC RX IP 250 OP 636: Mod: JZ | Performed by: NURSE PRACTITIONER

## 2023-10-20 PROCEDURE — 83735 ASSAY OF MAGNESIUM: CPT | Performed by: SURGERY

## 2023-10-20 PROCEDURE — 250N000011 HC RX IP 250 OP 636: Mod: JZ | Performed by: SURGERY

## 2023-10-20 PROCEDURE — 120N000001 HC R&B MED SURG/OB

## 2023-10-20 PROCEDURE — 250N000013 HC RX MED GY IP 250 OP 250 PS 637: Performed by: LEGAL MEDICINE

## 2023-10-20 PROCEDURE — 85025 COMPLETE CBC W/AUTO DIFF WBC: CPT | Performed by: SURGERY

## 2023-10-20 PROCEDURE — 250N000011 HC RX IP 250 OP 636: Performed by: LEGAL MEDICINE

## 2023-10-20 PROCEDURE — 87493 C DIFF AMPLIFIED PROBE: CPT | Performed by: LEGAL MEDICINE

## 2023-10-20 PROCEDURE — 999N000065 XR ABDOMEN PORT 1 VIEW

## 2023-10-20 RX ORDER — SACCHAROMYCES BOULARDII 250 MG
250 CAPSULE ORAL 2 TIMES DAILY
Status: DISCONTINUED | OUTPATIENT
Start: 2023-10-20 | End: 2023-10-23 | Stop reason: HOSPADM

## 2023-10-20 RX ORDER — DEXTROSE, SODIUM CHLORIDE, SODIUM LACTATE, POTASSIUM CHLORIDE, AND CALCIUM CHLORIDE 5; .6; .31; .03; .02 G/100ML; G/100ML; G/100ML; G/100ML; G/100ML
INJECTION, SOLUTION INTRAVENOUS CONTINUOUS
Status: DISCONTINUED | OUTPATIENT
Start: 2023-10-20 | End: 2023-10-23

## 2023-10-20 RX ORDER — LORAZEPAM 2 MG/ML
0.5 INJECTION INTRAMUSCULAR EVERY 6 HOURS PRN
Status: DISCONTINUED | OUTPATIENT
Start: 2023-10-20 | End: 2023-10-23 | Stop reason: HOSPADM

## 2023-10-20 RX ADMIN — SERTRALINE HYDROCHLORIDE 50 MG: 50 TABLET ORAL at 08:42

## 2023-10-20 RX ADMIN — LOSARTAN POTASSIUM 25 MG: 25 TABLET, FILM COATED ORAL at 08:41

## 2023-10-20 RX ADMIN — HYDROMORPHONE HYDROCHLORIDE 0.2 MG: 0.2 INJECTION, SOLUTION INTRAMUSCULAR; INTRAVENOUS; SUBCUTANEOUS at 23:35

## 2023-10-20 RX ADMIN — Medication 250 MG: at 12:00

## 2023-10-20 RX ADMIN — FAMOTIDINE 20 MG: 10 INJECTION, SOLUTION INTRAVENOUS at 20:24

## 2023-10-20 RX ADMIN — FAMOTIDINE 20 MG: 20 TABLET, FILM COATED ORAL at 08:42

## 2023-10-20 RX ADMIN — ONDANSETRON 4 MG: 2 INJECTION INTRAMUSCULAR; INTRAVENOUS at 20:21

## 2023-10-20 RX ADMIN — PROCHLORPERAZINE EDISYLATE 10 MG: 5 INJECTION INTRAMUSCULAR; INTRAVENOUS at 13:38

## 2023-10-20 RX ADMIN — Medication 1 LOZENGE: at 20:25

## 2023-10-20 RX ADMIN — ONDANSETRON 4 MG: 2 INJECTION INTRAMUSCULAR; INTRAVENOUS at 00:22

## 2023-10-20 RX ADMIN — ONDANSETRON 4 MG: 2 INJECTION INTRAMUSCULAR; INTRAVENOUS at 10:34

## 2023-10-20 RX ADMIN — ENOXAPARIN SODIUM 40 MG: 40 INJECTION SUBCUTANEOUS at 08:42

## 2023-10-20 RX ADMIN — SODIUM CHLORIDE, SODIUM LACTATE, POTASSIUM CHLORIDE, CALCIUM CHLORIDE AND DEXTROSE MONOHYDRATE: 5; 600; 310; 30; 20 INJECTION, SOLUTION INTRAVENOUS at 20:45

## 2023-10-20 RX ADMIN — LORAZEPAM 0.5 MG: 2 INJECTION INTRAMUSCULAR; INTRAVENOUS at 20:22

## 2023-10-20 ASSESSMENT — ACTIVITIES OF DAILY LIVING (ADL)
ADLS_ACUITY_SCORE: 36

## 2023-10-20 NOTE — PROVIDER NOTIFICATION
At 1534 the writer of this note was in the room assessing the patient when he projectile vomited up 1,300 mL of thin watery green emesis. Provider notified and per provider will place an NG tube, Npo with ice chips, and get and abd x ray. Awaiting orders now.     16:35 NG placed with thin green output, Xray obtained now.     1700 pt requested something to help him sleep before bed tonight. Surgeon called and updated that abd x ray was done and that pt requesting something for sleep.

## 2023-10-20 NOTE — PLAN OF CARE
Pt alert and oriented x3, flat affect. VS and assessment as charted. Rec'd Oxy and acetaminophen for pain with effectiveness. Regular diet, encouraging Fluids, IV SL.  Pt vomited x1 during the night and rec'd Zofran x1 with no further emesis, denies nausea at this time. Abd rounded, distended, bowel sounds hyperactive in lower quads, tinkling sounds in upper quads, belching and passing flatus. Reports watery yellow stools. Encouraging ambulation,TCDB and use of IS. Pt up independently in room, needs encouragement to ambulate halls. Free from falls/ injury, call light in reach, makes needs known.   Face to face report given with opportunity to observe patient.    Report given to SURAJ Hansen RN   10/20/2023  7:15 AM

## 2023-10-20 NOTE — PLAN OF CARE
Pt had another episode of emesis at 10:45. Surgeon updated and per surgeon will check a c diff and no imagining indicated at this time. Also, per surgeon dressing is okay to remain on at this time and will be removed by surgeon before discharge.     1400 - C diff negative and surgeon updated. Plan of care remains the same at this time. Wife concerned that abd is very distended, discussed with surgeon and no scans needed at this time per the surgeon.

## 2023-10-20 NOTE — PLAN OF CARE
1A&O, VSS, with a low grade temp of 99.5. Rating pain this afternoon at a 4/10 along his incision and c/o gas discomfort, ice pack applied and meds offered but pt declined.     Abd remains firm, tender and distended. Bowel sounds started as normoactive in the lowers and are now more hypoactive in the lowers, uppers have remained a consistent tinkling sound. Pt passing gas and has had several loose Bms today watery green/yellow per report from patient. Ambulated approx 4x independently in hallway with wife to try to move gas. Two emesis episodes of thin green watery, zofran and compazine given with some relief. See provider notification notes. NG tube placed this afternoon 60 cm at the nare and abd x ray showing possible illeus. Remains NPO with ice chips and NG at low intermittent suction total of 100 out. Rest of assessments as charted, SL. Up to the bathroom independently, instructed to call when he needs to get up with NG now in place. Wife remains at bedside. Surgeon ordered melatonin for sleep see MAR. Remains free of injury, call light within reach. Face to face report given with opportunity to observe patient.    Report given to Yvonne Bateman RN   10/20/2023  7:01 PM

## 2023-10-20 NOTE — PROGRESS NOTES
[facility] General Surgery Post-Op / Progress Note         Assessment and Plan:    Assessment:   Post-operative day #4  Right Hemicolectomy     Still with persistent, non-bloody emesis.  Does not seem to be associated with particular inciting factors, not directly associated with food intake, and is not occurring around the times of his pain medications.  Also having watery diarrhea, sometimes without warning.  Still sore, but no more than expected.  Won't be ready for discharge until we see how these things work out.      Plan:   C. Diff stool testing.  Continue antiemetics and fluids.  Try probiotics.           Interval History:   See above.             Medications:   All medications related to the patient's surgery have been reviewed  Unchanged.          Physical Exam:   All vitals stable  Temp: 98.5  F (36.9  C) Temp src: Tympanic BP: 128/86 Pulse: 78   Resp: 18 SpO2: 93 % O2 Device: None (Room air)    I/O last 3 completed shifts:  In: 360 [P.O.:360]  Out: -   Alert.  Not in distress.  Lungs clear.  Cor regular without murmur.  Abd distended but soft with no guarding or peritonitis.  Only incisional tenderness.  Non-tender bilaterally.  Excellent bowel sounds.  Dressing dry and intact.  WBC is now in the normal range at 10.4.  Hb 16.4.          Chema Brown MD

## 2023-10-21 LAB
ANION GAP SERPL CALCULATED.3IONS-SCNC: 13 MMOL/L (ref 7–15)
BASO+EOS+MONOS # BLD AUTO: NORMAL 10*3/UL
BASO+EOS+MONOS NFR BLD AUTO: NORMAL %
BASOPHILS # BLD AUTO: 0 10E3/UL (ref 0–0.2)
BASOPHILS NFR BLD AUTO: 1 %
BUN SERPL-MCNC: 26 MG/DL (ref 6–20)
CALCIUM SERPL-MCNC: 9.6 MG/DL (ref 8.6–10)
CHLORIDE SERPL-SCNC: 95 MMOL/L (ref 98–107)
CREAT SERPL-MCNC: 0.81 MG/DL (ref 0.67–1.17)
DEPRECATED HCO3 PLAS-SCNC: 30 MMOL/L (ref 22–29)
EGFRCR SERPLBLD CKD-EPI 2021: >90 ML/MIN/1.73M2
EOSINOPHIL # BLD AUTO: 0.1 10E3/UL (ref 0–0.7)
EOSINOPHIL NFR BLD AUTO: 1 %
ERYTHROCYTE [DISTWIDTH] IN BLOOD BY AUTOMATED COUNT: 12.7 % (ref 10–15)
GLUCOSE SERPL-MCNC: 151 MG/DL (ref 70–99)
HCT VFR BLD AUTO: 47.3 % (ref 40–53)
HGB BLD-MCNC: 15.9 G/DL (ref 13.3–17.7)
IMM GRANULOCYTES # BLD: 0 10E3/UL
IMM GRANULOCYTES NFR BLD: 1 %
LYMPHOCYTES # BLD AUTO: 0.9 10E3/UL (ref 0.8–5.3)
LYMPHOCYTES NFR BLD AUTO: 14 %
MAGNESIUM SERPL-MCNC: 2.3 MG/DL (ref 1.7–2.3)
MCH RBC QN AUTO: 28.9 PG (ref 26.5–33)
MCHC RBC AUTO-ENTMCNC: 33.6 G/DL (ref 31.5–36.5)
MCV RBC AUTO: 86 FL (ref 78–100)
MONOCYTES # BLD AUTO: 1.2 10E3/UL (ref 0–1.3)
MONOCYTES NFR BLD AUTO: 19 %
NEUTROPHILS # BLD AUTO: 4.2 10E3/UL (ref 1.6–8.3)
NEUTROPHILS NFR BLD AUTO: 64 %
NRBC # BLD AUTO: 0 10E3/UL
NRBC BLD AUTO-RTO: 0 /100
PHOSPHATE SERPL-MCNC: 3.3 MG/DL (ref 2.5–4.5)
PLATELET # BLD AUTO: 238 10E3/UL (ref 150–450)
POTASSIUM SERPL-SCNC: 3.1 MMOL/L (ref 3.4–5.3)
POTASSIUM SERPL-SCNC: 3.2 MMOL/L (ref 3.4–5.3)
POTASSIUM SERPL-SCNC: 3.3 MMOL/L (ref 3.4–5.3)
RBC # BLD AUTO: 5.51 10E6/UL (ref 4.4–5.9)
SODIUM SERPL-SCNC: 138 MMOL/L (ref 135–145)
WBC # BLD AUTO: 6.4 10E3/UL (ref 4–11)

## 2023-10-21 PROCEDURE — 250N000013 HC RX MED GY IP 250 OP 250 PS 637: Performed by: SURGERY

## 2023-10-21 PROCEDURE — 84132 ASSAY OF SERUM POTASSIUM: CPT | Performed by: LEGAL MEDICINE

## 2023-10-21 PROCEDURE — 250N000011 HC RX IP 250 OP 636: Mod: JZ | Performed by: SURGERY

## 2023-10-21 PROCEDURE — 85025 COMPLETE CBC W/AUTO DIFF WBC: CPT | Performed by: SURGERY

## 2023-10-21 PROCEDURE — 250N000011 HC RX IP 250 OP 636: Performed by: LEGAL MEDICINE

## 2023-10-21 PROCEDURE — 80048 BASIC METABOLIC PNL TOTAL CA: CPT | Performed by: SURGERY

## 2023-10-21 PROCEDURE — 36415 COLL VENOUS BLD VENIPUNCTURE: CPT | Performed by: LEGAL MEDICINE

## 2023-10-21 PROCEDURE — 84132 ASSAY OF SERUM POTASSIUM: CPT | Performed by: SURGERY

## 2023-10-21 PROCEDURE — 83735 ASSAY OF MAGNESIUM: CPT | Performed by: SURGERY

## 2023-10-21 PROCEDURE — 250N000011 HC RX IP 250 OP 636: Mod: JZ | Performed by: NURSE PRACTITIONER

## 2023-10-21 PROCEDURE — 120N000001 HC R&B MED SURG/OB

## 2023-10-21 PROCEDURE — 36415 COLL VENOUS BLD VENIPUNCTURE: CPT | Performed by: SURGERY

## 2023-10-21 PROCEDURE — 250N000013 HC RX MED GY IP 250 OP 250 PS 637: Performed by: LEGAL MEDICINE

## 2023-10-21 PROCEDURE — 84100 ASSAY OF PHOSPHORUS: CPT | Performed by: SURGERY

## 2023-10-21 RX ORDER — BISACODYL 10 MG
10 SUPPOSITORY, RECTAL RECTAL DAILY
Status: DISCONTINUED | OUTPATIENT
Start: 2023-10-21 | End: 2023-10-21

## 2023-10-21 RX ORDER — PANTOPRAZOLE SODIUM 40 MG/1
40 TABLET, DELAYED RELEASE ORAL
Status: DISCONTINUED | OUTPATIENT
Start: 2023-10-21 | End: 2023-10-23 | Stop reason: HOSPADM

## 2023-10-21 RX ORDER — POTASSIUM CHLORIDE 1500 MG/1
40 TABLET, EXTENDED RELEASE ORAL ONCE
Status: COMPLETED | OUTPATIENT
Start: 2023-10-21 | End: 2023-10-21

## 2023-10-21 RX ORDER — POTASSIUM CHLORIDE 7.45 MG/ML
10 INJECTION INTRAVENOUS
Status: COMPLETED | OUTPATIENT
Start: 2023-10-21 | End: 2023-10-21

## 2023-10-21 RX ORDER — MAGNESIUM HYDROXIDE/ALUMINUM HYDROXICE/SIMETHICONE 120; 1200; 1200 MG/30ML; MG/30ML; MG/30ML
30 SUSPENSION ORAL EVERY 4 HOURS PRN
Status: DISCONTINUED | OUTPATIENT
Start: 2023-10-21 | End: 2023-10-21

## 2023-10-21 RX ADMIN — POTASSIUM CHLORIDE 10 MEQ: 7.46 INJECTION, SOLUTION INTRAVENOUS at 11:26

## 2023-10-21 RX ADMIN — LOSARTAN POTASSIUM 25 MG: 25 TABLET, FILM COATED ORAL at 08:07

## 2023-10-21 RX ADMIN — LORAZEPAM 0.5 MG: 2 INJECTION INTRAMUSCULAR; INTRAVENOUS at 02:21

## 2023-10-21 RX ADMIN — POTASSIUM CHLORIDE 40 MEQ: 1500 TABLET, EXTENDED RELEASE ORAL at 16:32

## 2023-10-21 RX ADMIN — Medication 250 MG: at 08:07

## 2023-10-21 RX ADMIN — Medication 250 MG: at 20:10

## 2023-10-21 RX ADMIN — FAMOTIDINE 20 MG: 20 TABLET, FILM COATED ORAL at 08:22

## 2023-10-21 RX ADMIN — ENOXAPARIN SODIUM 40 MG: 40 INJECTION SUBCUTANEOUS at 08:24

## 2023-10-21 RX ADMIN — SODIUM CHLORIDE, SODIUM LACTATE, POTASSIUM CHLORIDE, CALCIUM CHLORIDE AND DEXTROSE MONOHYDRATE: 5; 600; 310; 30; 20 INJECTION, SOLUTION INTRAVENOUS at 20:29

## 2023-10-21 RX ADMIN — PANTOPRAZOLE SODIUM 40 MG: 40 TABLET, DELAYED RELEASE ORAL at 16:32

## 2023-10-21 RX ADMIN — SODIUM CHLORIDE, SODIUM LACTATE, POTASSIUM CHLORIDE, CALCIUM CHLORIDE AND DEXTROSE MONOHYDRATE: 5; 600; 310; 30; 20 INJECTION, SOLUTION INTRAVENOUS at 06:05

## 2023-10-21 RX ADMIN — POTASSIUM CHLORIDE 10 MEQ: 7.46 INJECTION, SOLUTION INTRAVENOUS at 08:09

## 2023-10-21 RX ADMIN — POTASSIUM CHLORIDE 10 MEQ: 7.46 INJECTION, SOLUTION INTRAVENOUS at 10:23

## 2023-10-21 RX ADMIN — SERTRALINE HYDROCHLORIDE 50 MG: 50 TABLET ORAL at 08:07

## 2023-10-21 RX ADMIN — POTASSIUM CHLORIDE 10 MEQ: 7.46 INJECTION, SOLUTION INTRAVENOUS at 09:12

## 2023-10-21 ASSESSMENT — ACTIVITIES OF DAILY LIVING (ADL)
ADLS_ACUITY_SCORE: 36
ADLS_ACUITY_SCORE: 34
ADLS_ACUITY_SCORE: 36
ADLS_ACUITY_SCORE: 36
ADLS_ACUITY_SCORE: 34
ADLS_ACUITY_SCORE: 36
ADLS_ACUITY_SCORE: 34
ADLS_ACUITY_SCORE: 36

## 2023-10-21 NOTE — PLAN OF CARE
Patient alert and orientated throughout shift, following commands, afebrile. HR regular 60's to 70's, BP's remain stable. RA lung sounds clear. Patient up independently to bathroom to void, only 300 mL of urine out throughout shift. Provider updated about low urine output and ordered for an increase in fluids running. 2 loose BM this shift. Bowel sounds normoactive this afternoon, denied any nausea, and tolerated clear liquids today. Up around room and ambulating independently in [place]on.    Face to face report given with opportunity to observe patient.    Report given to Emily Lopez RN   10/21/2023  7:20 PM

## 2023-10-21 NOTE — PLAN OF CARE
Pt alert and oriented x3, flat affect. VS and assessment as charted. Rec'd Dilaudid for pain with effectiveness. 1000 green output from NG this shift. NG tube removed this morning per Surgeon orders, surgeon also removed abd dressing and left incision open to air. Kaycee-incisional site with ecchymosis. Clear liquid diet,  IV infusing D5 /hr. Abd rounded, soft/  tender to palpation. bowel sounds active, passing flatus.  Encouraging ambulation,TCDB and use of IS. Pt up independently in room, wife at bedside and attentive to pt. Free from falls/ injury, call light in reach, makes needs known.

## 2023-10-21 NOTE — PLAN OF CARE
0550: Dr. Brown here to round and in to see pt. Pt requesting NG out. 1100 out put since insertion. Per Surgeon, ok to remove NG without clamping or imaging prior to removal and pt may have clear liquids. NG removed. Clear liquids provided.

## 2023-10-21 NOTE — PLAN OF CARE
Face to face report given with opportunity to observe patient.    Report given to SURAJ Desai RN   10/21/2023  7:21 AM

## 2023-10-21 NOTE — PLAN OF CARE
Pt vomited 100cc green output around NG tube, Rec'd Ativan and Zofran per MAR and is now resting in bed with even respirations and reports feeling relief. NG with large amount green returns. Wife remains at bedside.

## 2023-10-21 NOTE — PROVIDER NOTIFICATION
Pt with freq gagging episodes while trying to tolerate the NG tube. NG with bright green steady output. Pt anxious, vss. No pain. Has not voided in some time per wife. Dr. Brown updated and new orders rec'd for IVF, Ativan and Cepacol lozenges.

## 2023-10-21 NOTE — PROGRESS NOTES
[facility] General Surgery Post-Op / Progress Note         Assessment and Plan:    Assessment:   Post-operative day #5  Right hemicolectomy.     Clean wound without signs of infection.  Pain well-controlled.  Doing better.  Abdomen much less distended.  Passing flatus.  Desires NG removal.  Offered clamping but he would simply like it removed.  He understands it may need to be replaced if he started vomiting again but I am optimistic that he will pass this trial.      Plan:   Remove NG.  Clear liquids.  Advance diet if he tolerates the liquids.  Shower.  If he tolerates oral intake without further difficulties then discharge planning could be advanced.           Interval History:   Large NG output but it has helped significantly.  He is really bothered by the NG tube.  Passing flatus.         Medications:   All medications related to the patient's surgery have been reviewed         Physical Exam:   Temp: 97.1  F (36.2  C) Temp src: Tympanic BP: 139/83 Pulse: 72   Resp: 18 SpO2: 94 % O2 Device: None (Room air)    I/O this shift:  In: 954 [I.V.:954]  Out: 925 [Urine:325; Emesis/NG output:600]   Latest Reference Range & Units 10/21/23 05:54   WBC 4.0 - 11.0 10e3/uL 6.4   Hemoglobin 13.3 - 17.7 g/dL 15.9   Platelet Count 150 - 450 10e3/uL 238       Exam this morning reveals that he is resting comfortably.  Lungs clear.  Heart regular.  Abdomen is soft with only minimal incisional tenderness.  No guarding or evidence of peritonitis.  Dressings were removed.  Incisions are dry with no evidence of redness or active drainage.  Some old blood crusting around the wounds but this is expected to wash off with the shower.    A/P:  See above.  Thank you.    Chema Brown MD

## 2023-10-22 LAB
ANION GAP SERPL CALCULATED.3IONS-SCNC: 10 MMOL/L (ref 7–15)
BASO+EOS+MONOS # BLD AUTO: NORMAL 10*3/UL
BASO+EOS+MONOS NFR BLD AUTO: NORMAL %
BASOPHILS # BLD AUTO: 0 10E3/UL (ref 0–0.2)
BASOPHILS NFR BLD AUTO: 0 %
BUN SERPL-MCNC: 13.4 MG/DL (ref 6–20)
CALCIUM SERPL-MCNC: 8.8 MG/DL (ref 8.6–10)
CHLORIDE SERPL-SCNC: 98 MMOL/L (ref 98–107)
CREAT SERPL-MCNC: 0.87 MG/DL (ref 0.67–1.17)
DEPRECATED HCO3 PLAS-SCNC: 28 MMOL/L (ref 22–29)
EGFRCR SERPLBLD CKD-EPI 2021: >90 ML/MIN/1.73M2
EOSINOPHIL # BLD AUTO: 0.2 10E3/UL (ref 0–0.7)
EOSINOPHIL NFR BLD AUTO: 3 %
ERYTHROCYTE [DISTWIDTH] IN BLOOD BY AUTOMATED COUNT: 12.5 % (ref 10–15)
GLUCOSE SERPL-MCNC: 102 MG/DL (ref 70–99)
HCT VFR BLD AUTO: 40.4 % (ref 40–53)
HGB BLD-MCNC: 13.4 G/DL (ref 13.3–17.7)
IMM GRANULOCYTES # BLD: 0 10E3/UL
IMM GRANULOCYTES NFR BLD: 0 %
LYMPHOCYTES # BLD AUTO: 1.6 10E3/UL (ref 0.8–5.3)
LYMPHOCYTES NFR BLD AUTO: 24 %
MAGNESIUM SERPL-MCNC: 1.9 MG/DL (ref 1.7–2.3)
MCH RBC QN AUTO: 28.8 PG (ref 26.5–33)
MCHC RBC AUTO-ENTMCNC: 33.2 G/DL (ref 31.5–36.5)
MCV RBC AUTO: 87 FL (ref 78–100)
MONOCYTES # BLD AUTO: 1.1 10E3/UL (ref 0–1.3)
MONOCYTES NFR BLD AUTO: 16 %
NEUTROPHILS # BLD AUTO: 3.8 10E3/UL (ref 1.6–8.3)
NEUTROPHILS NFR BLD AUTO: 57 %
NRBC # BLD AUTO: 0 10E3/UL
NRBC BLD AUTO-RTO: 0 /100
PHOSPHATE SERPL-MCNC: 2.5 MG/DL (ref 2.5–4.5)
PLATELET # BLD AUTO: 193 10E3/UL (ref 150–450)
POTASSIUM SERPL-SCNC: 3.5 MMOL/L (ref 3.4–5.3)
RBC # BLD AUTO: 4.66 10E6/UL (ref 4.4–5.9)
SODIUM SERPL-SCNC: 136 MMOL/L (ref 135–145)
WBC # BLD AUTO: 6.7 10E3/UL (ref 4–11)

## 2023-10-22 PROCEDURE — 120N000001 HC R&B MED SURG/OB

## 2023-10-22 PROCEDURE — 250N000011 HC RX IP 250 OP 636: Performed by: LEGAL MEDICINE

## 2023-10-22 PROCEDURE — 83735 ASSAY OF MAGNESIUM: CPT | Performed by: SURGERY

## 2023-10-22 PROCEDURE — 36415 COLL VENOUS BLD VENIPUNCTURE: CPT | Performed by: SURGERY

## 2023-10-22 PROCEDURE — 85004 AUTOMATED DIFF WBC COUNT: CPT | Performed by: SURGERY

## 2023-10-22 PROCEDURE — 80048 BASIC METABOLIC PNL TOTAL CA: CPT | Performed by: SURGERY

## 2023-10-22 PROCEDURE — 84100 ASSAY OF PHOSPHORUS: CPT | Performed by: SURGERY

## 2023-10-22 PROCEDURE — 250N000011 HC RX IP 250 OP 636: Mod: JZ | Performed by: NURSE PRACTITIONER

## 2023-10-22 PROCEDURE — 250N000013 HC RX MED GY IP 250 OP 250 PS 637: Performed by: SURGERY

## 2023-10-22 PROCEDURE — 250N000013 HC RX MED GY IP 250 OP 250 PS 637: Performed by: LEGAL MEDICINE

## 2023-10-22 RX ORDER — POTASSIUM CHLORIDE 1500 MG/1
40 TABLET, EXTENDED RELEASE ORAL ONCE
Status: COMPLETED | OUTPATIENT
Start: 2023-10-22 | End: 2023-10-22

## 2023-10-22 RX ADMIN — Medication 250 MG: at 20:39

## 2023-10-22 RX ADMIN — SODIUM CHLORIDE, SODIUM LACTATE, POTASSIUM CHLORIDE, CALCIUM CHLORIDE AND DEXTROSE MONOHYDRATE: 5; 600; 310; 30; 20 INJECTION, SOLUTION INTRAVENOUS at 21:38

## 2023-10-22 RX ADMIN — POTASSIUM CHLORIDE 40 MEQ: 1500 TABLET, EXTENDED RELEASE ORAL at 02:31

## 2023-10-22 RX ADMIN — PANTOPRAZOLE SODIUM 40 MG: 40 TABLET, DELAYED RELEASE ORAL at 08:27

## 2023-10-22 RX ADMIN — SERTRALINE HYDROCHLORIDE 50 MG: 50 TABLET ORAL at 08:27

## 2023-10-22 RX ADMIN — SODIUM CHLORIDE, SODIUM LACTATE, POTASSIUM CHLORIDE, CALCIUM CHLORIDE AND DEXTROSE MONOHYDRATE: 5; 600; 310; 30; 20 INJECTION, SOLUTION INTRAVENOUS at 08:35

## 2023-10-22 RX ADMIN — SODIUM CHLORIDE, SODIUM LACTATE, POTASSIUM CHLORIDE, CALCIUM CHLORIDE AND DEXTROSE MONOHYDRATE: 5; 600; 310; 30; 20 INJECTION, SOLUTION INTRAVENOUS at 15:14

## 2023-10-22 RX ADMIN — SODIUM CHLORIDE, SODIUM LACTATE, POTASSIUM CHLORIDE, CALCIUM CHLORIDE AND DEXTROSE MONOHYDRATE: 5; 600; 310; 30; 20 INJECTION, SOLUTION INTRAVENOUS at 03:08

## 2023-10-22 RX ADMIN — Medication 250 MG: at 08:27

## 2023-10-22 RX ADMIN — ENOXAPARIN SODIUM 40 MG: 40 INJECTION SUBCUTANEOUS at 08:28

## 2023-10-22 RX ADMIN — LOSARTAN POTASSIUM 25 MG: 25 TABLET, FILM COATED ORAL at 08:27

## 2023-10-22 ASSESSMENT — ACTIVITIES OF DAILY LIVING (ADL)
ADLS_ACUITY_SCORE: 34

## 2023-10-22 NOTE — PROGRESS NOTES
[facility] General Surgery Post-Op / Progress Note         Assessment and Plan:    Assessment:   Post-operative day #6  Colectomy     Doing well.  Clean wound without signs of infection.  Pain well-controlled.  No N/V.  +BM.        Plan:   Advance diet to soft.  Probably home tomorrow if tolerating oral intake.              Medications:   All medications related to the patient's surgery have been reviewed   enoxaparin ANTICOAGULANT  40 mg Subcutaneous Q24H    losartan  25 mg Oral Daily    pantoprazole  40 mg Oral QAM AC    saccharomyces boulardii  250 mg Oral BID    sertraline  50 mg Oral Daily    sodium chloride (PF)  3 mL Intracatheter Q8H             Physical Exam:   Temp: 98.5  F (36.9  C) Temp src: Tympanic BP: 121/70 Pulse: 57   Resp: 16 SpO2: 95 %  O2 Device: None (Room air)    I/O last 3 completed shifts:  In: 3671 [P.O.:1080; I.V.:2591]  Out: 750 [Urine:750]  Alert and oriented.  Lungs clear.  Heart regular without murmur.  Abdomen is soft and essentially nontender.  No guarding or evidence of peritonitis.  Wound looks great.  No redness or drainage.  A little bruised but that is to be expected.          Data:   All laboratory data related to this surgery reviewed  Lab Results   Component Value Date    WBC 6.7 10/22/2023    HGB 13.4 10/22/2023    HCT 40.4 10/22/2023     10/22/2023     10/22/2023    POTASSIUM 3.5 10/22/2023    CHLORIDE 98 10/22/2023    CO2 28 10/22/2023    BUN 13.4 10/22/2023    CR 0.87 10/22/2023     (H) 10/22/2023    DD <0.3 02/13/2020    DIMER <200 11/04/2013    TROPI <0.015 02/13/2020    TROPN  11/04/2013     <0.04  **Risk Stratification**   0.10 - 0.39   Elevated-may indicate increased                 risk of short term adverse                 cardiac events.      >=0.4      Possible cardiac injury.    AST 20 09/06/2022    ALT 53 09/06/2022    ALKPHOS 82 09/06/2022    BILITOTAL 0.7 09/06/2022           Chema Brown MD

## 2023-10-22 NOTE — PLAN OF CARE
Neuro- Alert and oriented x4. Moves x4 extremities strongly/purposefully/to command. Steady gait. Independent with ADLs- no gait aids. Denies pain this shift.    Cardiovascular- Normotensive. Denies chest pain. No edema noted.    Respiratory- On room air. Anterior lung fields clear throughout. Denies shortness of breath.    GI/- Clear liquid diet. Abdomen soft, rounded/distended. Passing flatus. Numerous loose/liquid brown BMs this shift, Patient voiding clear, kiera urine this shift.    Integumentary- Abdominal incisions well-approximated, staples intact.    IV Access- #22g right hand- infusing/patent/site satisfactory.    Face to face report given with opportunity to observe patient.    Report given to Lloyd PAZ (RN)    Emily Herbert RN   10/22/2023  7:05 AM

## 2023-10-22 NOTE — PLAN OF CARE
Patient alert and orientated throughout shift, following commands, afebrile. HR 50's to 60's. Blood pressures remain stable. RA lung sounds clear. Bowel sounds remain normoactive. Denied nausea and no emesis throughout shift. Incisions remain clean and approximated with some bruising. Up ambulating independently and up to the bathroom to void. 3 loose BM per patient.     Face to face report given with opportunity to observe patient.    Report given to Emily Lopez RN   10/22/2023  7:09 PM

## 2023-10-23 VITALS
WEIGHT: 180.34 LBS | TEMPERATURE: 99.4 F | SYSTOLIC BLOOD PRESSURE: 131 MMHG | RESPIRATION RATE: 14 BRPM | BODY MASS INDEX: 28.98 KG/M2 | HEART RATE: 66 BPM | HEIGHT: 66 IN | OXYGEN SATURATION: 97 % | DIASTOLIC BLOOD PRESSURE: 73 MMHG

## 2023-10-23 LAB
ANION GAP SERPL CALCULATED.3IONS-SCNC: 8 MMOL/L (ref 7–15)
BASO+EOS+MONOS # BLD AUTO: ABNORMAL 10*3/UL
BASO+EOS+MONOS NFR BLD AUTO: ABNORMAL %
BASOPHILS # BLD AUTO: 0 10E3/UL (ref 0–0.2)
BASOPHILS NFR BLD AUTO: 0 %
BUN SERPL-MCNC: 7.2 MG/DL (ref 6–20)
CALCIUM SERPL-MCNC: 8.2 MG/DL (ref 8.6–10)
CHLORIDE SERPL-SCNC: 105 MMOL/L (ref 98–107)
CREAT SERPL-MCNC: 0.71 MG/DL (ref 0.67–1.17)
DEPRECATED HCO3 PLAS-SCNC: 24 MMOL/L (ref 22–29)
EGFRCR SERPLBLD CKD-EPI 2021: >90 ML/MIN/1.73M2
EOSINOPHIL # BLD AUTO: 0.2 10E3/UL (ref 0–0.7)
EOSINOPHIL NFR BLD AUTO: 2 %
ERYTHROCYTE [DISTWIDTH] IN BLOOD BY AUTOMATED COUNT: 12.3 % (ref 10–15)
GLUCOSE SERPL-MCNC: 114 MG/DL (ref 70–99)
HCT VFR BLD AUTO: 37.7 % (ref 40–53)
HGB BLD-MCNC: 12.4 G/DL (ref 13.3–17.7)
IMM GRANULOCYTES # BLD: 0 10E3/UL
IMM GRANULOCYTES NFR BLD: 1 %
LYMPHOCYTES # BLD AUTO: 1.4 10E3/UL (ref 0.8–5.3)
LYMPHOCYTES NFR BLD AUTO: 21 %
MAGNESIUM SERPL-MCNC: 1.7 MG/DL (ref 1.7–2.3)
MCH RBC QN AUTO: 28.8 PG (ref 26.5–33)
MCHC RBC AUTO-ENTMCNC: 32.9 G/DL (ref 31.5–36.5)
MCV RBC AUTO: 88 FL (ref 78–100)
MONOCYTES # BLD AUTO: 0.8 10E3/UL (ref 0–1.3)
MONOCYTES NFR BLD AUTO: 11 %
NEUTROPHILS # BLD AUTO: 4.4 10E3/UL (ref 1.6–8.3)
NEUTROPHILS NFR BLD AUTO: 65 %
NRBC # BLD AUTO: 0 10E3/UL
NRBC BLD AUTO-RTO: 0 /100
PHOSPHATE SERPL-MCNC: 2.1 MG/DL (ref 2.5–4.5)
PLATELET # BLD AUTO: 184 10E3/UL (ref 150–450)
POTASSIUM SERPL-SCNC: 3.4 MMOL/L (ref 3.4–5.3)
RBC # BLD AUTO: 4.3 10E6/UL (ref 4.4–5.9)
SODIUM SERPL-SCNC: 137 MMOL/L (ref 135–145)
WBC # BLD AUTO: 6.7 10E3/UL (ref 4–11)

## 2023-10-23 PROCEDURE — 250N000011 HC RX IP 250 OP 636: Performed by: LEGAL MEDICINE

## 2023-10-23 PROCEDURE — 80048 BASIC METABOLIC PNL TOTAL CA: CPT | Performed by: SURGERY

## 2023-10-23 PROCEDURE — 250N000013 HC RX MED GY IP 250 OP 250 PS 637: Performed by: LEGAL MEDICINE

## 2023-10-23 PROCEDURE — 83735 ASSAY OF MAGNESIUM: CPT | Performed by: SURGERY

## 2023-10-23 PROCEDURE — 85025 COMPLETE CBC W/AUTO DIFF WBC: CPT | Performed by: SURGERY

## 2023-10-23 PROCEDURE — 36415 COLL VENOUS BLD VENIPUNCTURE: CPT | Performed by: SURGERY

## 2023-10-23 PROCEDURE — 84100 ASSAY OF PHOSPHORUS: CPT | Performed by: SURGERY

## 2023-10-23 PROCEDURE — 250N000013 HC RX MED GY IP 250 OP 250 PS 637: Performed by: SURGERY

## 2023-10-23 RX ORDER — OXYCODONE HYDROCHLORIDE 5 MG/1
5 TABLET ORAL EVERY 6 HOURS PRN
Qty: 12 TABLET | Refills: 0 | Status: SHIPPED | OUTPATIENT
Start: 2023-10-23 | End: 2023-11-03

## 2023-10-23 RX ADMIN — LOSARTAN POTASSIUM 25 MG: 25 TABLET, FILM COATED ORAL at 08:30

## 2023-10-23 RX ADMIN — PANTOPRAZOLE SODIUM 40 MG: 40 TABLET, DELAYED RELEASE ORAL at 08:30

## 2023-10-23 RX ADMIN — SERTRALINE HYDROCHLORIDE 50 MG: 50 TABLET ORAL at 08:30

## 2023-10-23 RX ADMIN — Medication 250 MG: at 08:30

## 2023-10-23 RX ADMIN — SODIUM CHLORIDE, SODIUM LACTATE, POTASSIUM CHLORIDE, CALCIUM CHLORIDE AND DEXTROSE MONOHYDRATE: 5; 600; 310; 30; 20 INJECTION, SOLUTION INTRAVENOUS at 04:19

## 2023-10-23 ASSESSMENT — ACTIVITIES OF DAILY LIVING (ADL)
ADLS_ACUITY_SCORE: 34

## 2023-10-23 NOTE — PLAN OF CARE
Neuro- Alert and oriented x4. Moves x4 extremities spontaneously/purposefully/to command. Independent in room/up ad kelly. Denies pain this shift.    Cardiovascular- Normotensive. Denies chest pain. No edema noted.    Respiratory- On room air. Anterior lung fields clear throughout. Denies shortness of breath.    GI/- Abdomen soft, non-tender on palpation. Remains distended/rounded. Reporting x2 watery stools this shift, brown in nature. Voiding kiera urine.    Integumentary- x3 incisions well approximated, bruised, staples intact. No drainage.    IV Access- #22g right hand- D5W + LR @150cc/hr.    Face to face report given with opportunity to observe patient.    Report given to Paty FERREIRA)    Emily Herbert RN   10/23/2023  7:25 AM

## 2023-10-23 NOTE — PROGRESS NOTES
INPATIENT ROUNDING NOTE  10/23/2023    Patient: Jay Jay Chatman    Physician of Record: Jez Alvarez MD    Admitting diagnosis: History of colonic polyps [Z86.010]  Polyp of ascending colon [K63.5]    Procedure(s):  Laparoscopic Right Hemicolectomy     POD: 7 Days Post-Op    Current Diet: low fiber diet    CURRENT MEDICATIONS:  Continuous Medications:  Current Facility-Administered Medications   Medication Last Rate       Scheduled Medications:  Current Facility-Administered Medications   Medication Dose Route Frequency    enoxaparin ANTICOAGULANT  40 mg Subcutaneous Q24H    losartan  25 mg Oral Daily    pantoprazole  40 mg Oral QAM AC    saccharomyces boulardii  250 mg Oral BID    sertraline  50 mg Oral Daily    sodium chloride (PF)  3 mL Intracatheter Q8H       PRN Medications:  Current Facility-Administered Medications   Medication Dose Route Frequency    acetaminophen  650 mg Oral Q4H PRN    sore throat  1 lozenge Buccal Q1H PRN    HYDROmorphone  0.2 mg Intravenous Q2H PRN    Or    HYDROmorphone  0.4 mg Intravenous Q2H PRN    lidocaine 4%   Topical Q1H PRN    lidocaine (buffered or not buffered)  0.1-1 mL Other Q1H PRN    LORazepam  0.5 mg Intravenous Q6H PRN    melatonin  5 mg Oral At Bedtime PRN    naloxone  0.2 mg Intravenous Q2 Min PRN    Or    naloxone  0.4 mg Intravenous Q2 Min PRN    Or    naloxone  0.2 mg Intramuscular Q2 Min PRN    Or    naloxone  0.4 mg Intramuscular Q2 Min PRN    ondansetron  4 mg Oral Q6H PRN    Or    ondansetron  4 mg Intravenous Q6H PRN    oxyCODONE  5 mg Oral Q4H PRN    Or    oxyCODONE  10 mg Oral Q4H PRN    prochlorperazine  10 mg Intravenous Q6H PRN    Or    prochlorperazine  10 mg Oral Q6H PRN    sodium chloride (PF)  3 mL Intracatheter q1 min prn       SUBJECTIVE:   Nausea: No. Vomiting: No. Fever: No. Chills: No. Excessive burping: No. Flatus: Yes. BM: Yes. Pain control: good. Tolerating current diet: Yes.     PHYSICAL EXAM:   Vital signs: /80 (BP Location: Left arm,  "Cuff Size: Adult Regular)   Pulse 63   Temp 99.3  F (37.4  C) (Tympanic)   Resp 12   Ht 1.676 m (5' 6\")   Wt 81.8 kg (180 lb 5.4 oz)   SpO2 95%   BMI 29.11 kg/m     BMI: Body mass index is 29.11 kg/m .   General: Normal, healthy, cooperative, in no acute distress, alert   Lungs: respirations are non-labored   Abdominal: non-distended   Wound:  staples intact   Extremities: No cyanosis, clubbing or edema noted bilaterally in Upper and Lower Extremities   Neurological: without deficit    INPUT/OUTPUT:      Intake/Output Summary (Last 24 hours) at 10/23/2023 0748  Last data filed at 10/23/2023 0600  Gross per 24 hour   Intake 4454 ml   Output 1465 ml   Net 2989 ml       I/O last 3 completed shifts:  In: 4454 [P.O.:840; I.V.:3614]  Out: 1790 [Urine:1790]    LABS:    Last CBC Rrsults:   Recent Labs   Lab Test 10/23/23  0544 10/22/23  0615 10/21/23  0554   WBC 6.7 6.7 6.4   RBC 4.30* 4.66 5.51   HGB 12.4* 13.4 15.9   HCT 37.7* 40.4 47.3   MCV 88 87 86   MCH 28.8 28.8 28.9   MCHC 32.9 33.2 33.6   RDW 12.3 12.5 12.7    193 238       Last Comprehensive Metabolic panel:  Recent Labs   Lab Test 10/23/23  0544 10/22/23  0615 10/21/23  2105 10/21/23  1433 10/21/23  0554 09/11/23  1840 09/06/22  1058 03/31/21  0934 02/13/20  1259    136  --   --  138   < > 137 137 138   POTASSIUM 3.4 3.5 3.2*   < > 3.1*   < > 3.9 3.7 3.4   CHLORIDE 105 98  --   --  95*   < > 104 103 104   CO2 24 28  --   --  30*   < > 28 30 27   ANIONGAP 8 10  --   --  13   < > 5 4 7   * 102*  --   --  151*   < > 90 92 108*   BUN 7.2 13.4  --   --  26.0*   < > 10 12 11   CR 0.71 0.87  --   --  0.81   < > 0.86 0.85 0.97   GFRESTIMATED >90 >90  --   --  >90   < > >90 >90 >90   LAKSHMI 8.2* 8.8  --   --  9.6   < > 9.0 8.9 8.8   BILITOTAL  --   --   --   --   --   --  0.7 0.5 0.4   ALKPHOS  --   --   --   --   --   --  82 82 88   ALT  --   --   --   --   --   --  53 51 49   AST  --   --   --   --   --   --  20 19 18    < > = values in this " interval not displayed.       Recent Labs   Lab Test 10/23/23  0544 10/22/23  0615 10/21/23  0554 10/17/23  0550 09/06/22  1058 03/31/21  0934 02/13/20  1259   MAG 1.7 1.9 2.3   < >  --   --   --    ALBUMIN  --   --   --   --  3.7 3.8 3.9   PHOS 2.1* 2.5 3.3   < >  --   --   --     < > = values in this interval not displayed.       ASSESSMENT:    7 Days Post-Op from Procedure(s):  Laparoscopic Right Hemicolectomy.      PLAN:  Discharge home

## 2023-10-23 NOTE — DISCHARGE SUMMARY
INPATIENT DISCHARGE SUMMARY  10/23/2023    Patient'S Name: Jay Jay Chatman    Admitting Physician of Record: Jez Alvarez MD    Discharging Provider:  Saundra Clark NP    Date of admission: 10/16/2023     Date of discharge: 10/23/2023    Admitting diagnosis: History of colonic polyps [Z86.010]  Polyp of ascending colon [K63.5]    Discharge diagnosis: Same    Procedures: Procedure(s):  Laparoscopic Right Hemicolectomy    Consultants: None    Hospital course: The patient was admitted to the hospital and taken to the operating room and underwent an Procedure(s):  Laparoscopic Right Hemicolectomy.  The patient tolerated the procedure(s) well and was transferred to the rivero.  His postoperative course has been completely unremarkable.  At the time of discharge he is eating a Regular diet, is having good pain control on oral medications, and is passing gas and is having bowel movements.  The patient will be discharged home in good condition.    Discharge instructions include:    Patient will be discharged to Home   Diet:   Active Diet and Nourishment Order   Procedures    Low Fiber Diet      Activity : no liting over 10 pounds for 6 weeks   Follow-up:     The patient will follow up with his primary care provider in 1 weeks.      The patient will follow up with me in 1 week.   Medications include:    All prior medications

## 2023-10-23 NOTE — PLAN OF CARE
"/73 (BP Location: Left arm, Cuff Size: Adult Regular)   Pulse 66   Temp 99.4  F (37.4  C) (Tympanic)   Resp 14   Ht 1.676 m (5' 6\")   Wt 81.8 kg (180 lb 5.4 oz)   SpO2 97%   BMI 29.11 kg/m      A&O, flat affect, VSS, 99.4 temp, c/o pain to midline abd 1/10, incision Open to air, staples in place no drainage, ecchymotic/yellow around site, abd distended/soft/non tender, passing flatus, no BM this shift, denies N/V, fair appetite, lungs clear remains on RA,  L wrist pink from prev iv infiltrate, tender, 1+ pitting, IV removed to R for discharge, ambulated with wife in rendon x1, free from falls, pt discharged.    Patient discharged at 9:50 AM via ambulation accompanied by spouse and staff. Prescriptions sent to patients preferred pharmacy. All belongings sent with patient.     Discharge instructions reviewed with patient/wife. Listed belongings gathered and returned to patient. yes    Patient discharged to Home.   Report called to N/A    Surgical Patient   Surgical Procedures during stay:  hemicolectomy  Did patient receive discharge instruction on wound care and recognition of infection symptoms? Yes    MISC  Follow up appointment made:  Yes  Home medications returned to patient: N/A  Patient reports pain was well managed at discharge: Yes    "

## 2023-10-31 ENCOUNTER — OFFICE VISIT (OUTPATIENT)
Dept: SURGERY | Facility: OTHER | Age: 48
End: 2023-10-31
Attending: NURSE PRACTITIONER
Payer: COMMERCIAL

## 2023-10-31 VITALS
HEIGHT: 66 IN | WEIGHT: 182 LBS | SYSTOLIC BLOOD PRESSURE: 126 MMHG | OXYGEN SATURATION: 97 % | RESPIRATION RATE: 15 BRPM | DIASTOLIC BLOOD PRESSURE: 82 MMHG | BODY MASS INDEX: 29.25 KG/M2 | HEART RATE: 73 BPM

## 2023-10-31 DIAGNOSIS — Z90.49 STATUS POST RIGHT HEMICOLECTOMY: Primary | ICD-10-CM

## 2023-10-31 PROCEDURE — 99024 POSTOP FOLLOW-UP VISIT: CPT | Performed by: NURSE PRACTITIONER

## 2023-10-31 ASSESSMENT — PAIN SCALES - GENERAL: PAINLEVEL: NO PAIN (0)

## 2023-10-31 NOTE — PROGRESS NOTES
"CLINIC NOTE - POST-OP SURGERY  10/31/2023    Patient:Jay Jay Chatman    Procedure: Hand Assisted Laparoscopic Right Hemicolectomy     This is a 48 year old male who is 2 weeks s/p Hand Assisted Laparoscopic Right Hemicolectomy .  The patient has no complaints today.     Current Medications:  Current Outpatient Medications   Medication Sig Dispense Refill    cetirizine (ZYRTEC) 10 MG tablet Take 10 mg by mouth daily as needed for allergies      losartan (COZAAR) 25 MG tablet Take 25 mg by mouth every evening      sertraline (ZOLOFT) 50 MG tablet Take 50 mg by mouth at bedtime      sildenafil (VIAGRA) 50 MG tablet Take 1 tablet (50 mg) by mouth daily as needed (Take 1 tab 30-60 minutes prior to sexual intercourse; once daily as needed) 30 tablet 0    oxyCODONE (ROXICODONE) 5 MG tablet Take 1 tablet (5 mg) by mouth every 6 hours as needed for moderate pain (Patient not taking: Reported on 10/31/2023) 12 tablet 0       Allergies:  No Known Allergies    PHYSICAL EXAM:   Vital signs: /82 (BP Location: Right arm, Cuff Size: Adult Regular)   Pulse 73   Resp 15   Ht 1.676 m (5' 6\")   Wt 82.6 kg (182 lb)   SpO2 97%   BMI 29.38 kg/m     BMI: Body mass index is 29.38 kg/m .   General: Normal, healthy, cooperative, in no acute distress, alert   Lungs: respirations are non-labored   Abdominal: non-distended   Wounds:  Well healed surgical scars consistent with his operation.     PATHOLOGY:  Case Report   Date Value Ref Range Status   10/16/2023   Final    Surgical Pathology Report                         Case: [id number]                                  Authorizing Provider:  Jez Alvarez MD  Collected:           10/16/2023 08:33 AM          Ordering Location:     [facility]     Received:            10/16/2023 09:48 AM          Pathologist:           Michael Martinez DO                                                         Specimen:    Large Intestine, Colon, right colon                           "                               Final Diagnosis   Date Value Ref Range Status   10/16/2023   Final    A.  Terminal ileum, appendix and cecum, ileocecectomy:  -Cecal-based tubulovillous adenoma.  -Appendix with fibrous obliteration of distal lumen.  -Three benign pericolonic lymph nodes.            ASSESSMENT:    48 year old male who is 2 weeks s/p Hand Assisted Laparoscopic Right Hemicolectomy .  Doing well.     PLAN:   Staples were removed without problems.    Follow-up in 4 weeks      Restrictions : Will continue lifting restrictions of no more than 10 pounds until 6 weeks after surgery    Patient placed in recalls for a colonoscopy in 1 year.

## 2023-11-03 ENCOUNTER — OFFICE VISIT (OUTPATIENT)
Dept: FAMILY MEDICINE | Facility: OTHER | Age: 48
End: 2023-11-03
Attending: STUDENT IN AN ORGANIZED HEALTH CARE EDUCATION/TRAINING PROGRAM
Payer: COMMERCIAL

## 2023-11-03 VITALS
WEIGHT: 181.13 LBS | BODY MASS INDEX: 29.23 KG/M2 | OXYGEN SATURATION: 96 % | HEART RATE: 85 BPM | TEMPERATURE: 99.2 F | SYSTOLIC BLOOD PRESSURE: 130 MMHG | DIASTOLIC BLOOD PRESSURE: 85 MMHG

## 2023-11-03 DIAGNOSIS — F41.9 ANXIETY: Primary | ICD-10-CM

## 2023-11-03 DIAGNOSIS — Z90.49 S/P RIGHT HEMICOLECTOMY: ICD-10-CM

## 2023-11-03 PROBLEM — K63.5 POLYP OF ASCENDING COLON: Status: RESOLVED | Noted: 2023-10-16 | Resolved: 2023-11-03

## 2023-11-03 PROCEDURE — 99213 OFFICE O/P EST LOW 20 MIN: CPT | Performed by: STUDENT IN AN ORGANIZED HEALTH CARE EDUCATION/TRAINING PROGRAM

## 2023-11-03 ASSESSMENT — ANXIETY QUESTIONNAIRES
IF YOU CHECKED OFF ANY PROBLEMS ON THIS QUESTIONNAIRE, HOW DIFFICULT HAVE THESE PROBLEMS MADE IT FOR YOU TO DO YOUR WORK, TAKE CARE OF THINGS AT HOME, OR GET ALONG WITH OTHER PEOPLE: NOT DIFFICULT AT ALL
3. WORRYING TOO MUCH ABOUT DIFFERENT THINGS: NOT AT ALL
GAD7 TOTAL SCORE: 3
6. BECOMING EASILY ANNOYED OR IRRITABLE: NOT AT ALL
5. BEING SO RESTLESS THAT IT IS HARD TO SIT STILL: SEVERAL DAYS
GAD7 TOTAL SCORE: 3
4. TROUBLE RELAXING: SEVERAL DAYS
2. NOT BEING ABLE TO STOP OR CONTROL WORRYING: NOT AT ALL
7. FEELING AFRAID AS IF SOMETHING AWFUL MIGHT HAPPEN: NOT AT ALL
1. FEELING NERVOUS, ANXIOUS, OR ON EDGE: SEVERAL DAYS

## 2023-11-03 ASSESSMENT — PAIN SCALES - GENERAL: PAINLEVEL: NO PAIN (0)

## 2023-11-03 ASSESSMENT — PATIENT HEALTH QUESTIONNAIRE - PHQ9
10. IF YOU CHECKED OFF ANY PROBLEMS, HOW DIFFICULT HAVE THESE PROBLEMS MADE IT FOR YOU TO DO YOUR WORK, TAKE CARE OF THINGS AT HOME, OR GET ALONG WITH OTHER PEOPLE: NOT DIFFICULT AT ALL
SUM OF ALL RESPONSES TO PHQ QUESTIONS 1-9: 2
SUM OF ALL RESPONSES TO PHQ QUESTIONS 1-9: 2

## 2023-11-03 NOTE — PROGRESS NOTES
Assessment & Plan     Anxiety  Some improvement - difficult to assess full benefit as he is out of his usual routine.   No side effects.   Will refill Zoloft.   Has follow up in one month with PCP - reassess if dose increase would be indicated then.   Overall GAD7 score is within normal limits   - sertraline (ZOLOFT) 50 MG tablet; Take 1 tablet (50 mg) by mouth at bedtime    S/P right hemicolectomy  Doing well now - was in hospital afterwards longer than anticipated  Question of firmness directly under sutures he noticed - no signs of distention or infection  May be small seroma vs inflammation - recommend monitoring  If abdominal pain, redness, drainage, increasing in size, needs follow up right away.   956}     Nicotine/Tobacco Cessation:  He reports that he has been smoking cigarettes. He has never used smokeless tobacco.  Nicotine/Tobacco Cessation Plan:   Information offered: Patient not interested at this time      Sosa Miranda MD  [facility] - YASMINE Goyal is a 48 year old, presenting for the following health issues:  Anxiety        11/3/2023    12:48 PM   Additional Questions   Roomed by Alka Mccain   Accompanied by Spouse         11/3/2023    12:48 PM   Patient Reported Additional Medications   Patient reports taking the following new medications [facility] GOLDSMITH       Anxiety Follow-Up  How are you doing with your anxiety since your last visit? Improved a little bit but patient states its hard to tell as he is not back at work   Are you having other symptoms that might be associated with anxiety? No  Have you had a significant life event? Health Concerns   Are you feeling depressed? No  Do you have any concerns with your use of alcohol or other drugs? No    At preoperative last month, patient noted daily pervasive anxiety leading to panic symptoms.   Zoloft initiated 10/6/23 at 25mg, increased to 50mg after one week.   Does note it seems to be helping  Less  phased by recent health events   Panic attacks less, still some mind racing at night, still hard to shut down at night   Goes back to work end of month - work can contribute to a lot of stress/anxiety    Has noticed some firmness under his incision (staples removed 10/31)  No nausea, vomitting, abdominal pain. Still looser stools.       Social History     Tobacco Use    Smoking status: Some Days     Types: Cigarettes    Smokeless tobacco: Never    Tobacco comments:     no passive exposure   Vaping Use    Vaping Use: Never used   Substance Use Topics    Alcohol use: No    Drug use: No         11/3/2023    12:49 PM   ANEL-7 SCORE   Total Score 3 (minimal anxiety)   Total Score 3         5/11/2016    10:03 AM 3/31/2021     8:00 AM 11/3/2023    12:47 PM   PHQ   PHQ-9 Total Score 0 3 2   Q9: Thoughts of better off dead/self-harm past 2 weeks Not at all Not at all Not at all           Objective    /85 (BP Location: Left arm, Patient Position: Sitting, Cuff Size: Adult Regular)   Pulse 85   Temp 99.2  F (37.3  C) (Tympanic)   Wt 82.2 kg (181 lb 2 oz)   SpO2 96%   BMI 29.23 kg/m    Body mass index is 29.23 kg/m .    Physical Exam  Constitutional:       General: He is not in acute distress.     Appearance: Normal appearance. He is not ill-appearing.   Pulmonary:      Effort: Pulmonary effort is normal.   Abdominal:      General: There is no distension.      Palpations: Abdomen is soft. There is no mass.      Tenderness: There is no abdominal tenderness. There is no guarding.      Comments: Some firmness directly under his mid-abdominal incision, very superficial, localized to SubQ. With no tenderness, redness, drainage, or abscess.    Skin:     General: Skin is warm and dry.   Neurological:      General: No focal deficit present.      Mental Status: He is alert.   Psychiatric:         Mood and Affect: Mood normal. Affect is flat.         Behavior: Behavior normal.         Thought Content: Thought content normal.          Judgment: Judgment normal.              Answers submitted by the patient for this visit:  Patient Health Questionnaire (Submitted on 11/3/2023)  If you checked off any problems, how difficult have these problems made it for you to do your work, take care of things at home, or get along with other people?: Not difficult at all  PHQ9 TOTAL SCORE: 2  ANEL-7 (Submitted on 11/3/2023)  ANEL 7 TOTAL SCORE: 3

## 2023-11-22 ENCOUNTER — OFFICE VISIT (OUTPATIENT)
Dept: SURGERY | Facility: OTHER | Age: 48
End: 2023-11-22
Attending: NURSE PRACTITIONER
Payer: COMMERCIAL

## 2023-11-22 VITALS
HEART RATE: 78 BPM | TEMPERATURE: 97.5 F | SYSTOLIC BLOOD PRESSURE: 132 MMHG | DIASTOLIC BLOOD PRESSURE: 88 MMHG | OXYGEN SATURATION: 98 %

## 2023-11-22 DIAGNOSIS — Z90.49 STATUS POST RIGHT HEMICOLECTOMY: Primary | ICD-10-CM

## 2023-11-22 PROCEDURE — 99024 POSTOP FOLLOW-UP VISIT: CPT | Performed by: NURSE PRACTITIONER

## 2023-11-22 ASSESSMENT — PAIN SCALES - GENERAL: PAINLEVEL: NO PAIN (0)

## 2023-11-22 NOTE — LETTER
November 22, 2023      Jay Jay Chatman  [address] [place] [id number]        To Whom It May Concern:    Jay Jay Chatman was seen in our clinic. He may return to work on 11/27/23 without restrictions.      Sincerely,        Saundra Clark, NP

## 2023-11-22 NOTE — PROGRESS NOTES
CLINIC NOTE - POST-OP SURGERY  11/22/2023    Patient:Jay Jay Chatman    Procedure: Hand Assisted Laparoscopic Right Hemicolectomy     This is a 48 year old male who is 5.5 weeks s/p Hand Assisted Laparoscopic Right Hemicolectomy.  The patient has no complaints today.     Current Medications:  Current Outpatient Medications   Medication Sig Dispense Refill    cetirizine (ZYRTEC) 10 MG tablet Take 10 mg by mouth daily as needed for allergies      losartan (COZAAR) 25 MG tablet Take 25 mg by mouth every evening      sertraline (ZOLOFT) 50 MG tablet Take 1 tablet (50 mg) by mouth at bedtime 90 tablet 0    sildenafil (VIAGRA) 50 MG tablet Take 1 tablet (50 mg) by mouth daily as needed (Take 1 tab 30-60 minutes prior to sexual intercourse; once daily as needed) (Patient not taking: Reported on 11/3/2023) 30 tablet 0       Allergies:  No Known Allergies    PHYSICAL EXAM:   Vital signs: /88 (BP Location: Right arm, Patient Position: Sitting, Cuff Size: Adult Large)   Pulse 78   Temp 97.5  F (36.4  C) (Tympanic)   SpO2 98%    BMI: There is no height or weight on file to calculate BMI.   General: Normal, healthy, cooperative, in no acute distress, alert   Lungs: respirations are non-labored   Abdominal: non-distended   Wounds:  Well healed surgical scars consistent with his operation.     PATHOLOGY:  Case Report   Date Value Ref Range Status   10/16/2023   Final    Surgical Pathology Report                         Case: [id number]                                  Authorizing Provider:  Jez Alvarez MD  Collected:           10/16/2023 08:33 AM          Ordering Location:     [facility]     Received:            10/16/2023 09:48 AM          Pathologist:           Michael Martinez DO                                                         Specimen:    Large Intestine, Colon, right colon                                                         Final Diagnosis   Date Value Ref Range Status   10/16/2023    Final    A.  Terminal ileum, appendix and cecum, ileocecectomy:  -Cecal-based tubulovillous adenoma.  -Appendix with fibrous obliteration of distal lumen.  -Three benign pericolonic lymph nodes.            ASSESSMENT:    48 year old male who is 5.5 weeks s/p Hand Assisted Laparoscopic Right Hemicolectomy.  Doing well.     PLAN:   Patient given work note.  He is to follow up in October of next year for a colonoscopy.     Patient placed in recalls for a colonoscopy in 1 year.

## 2023-12-07 ENCOUNTER — OFFICE VISIT (OUTPATIENT)
Dept: FAMILY MEDICINE | Facility: OTHER | Age: 48
End: 2023-12-07
Attending: STUDENT IN AN ORGANIZED HEALTH CARE EDUCATION/TRAINING PROGRAM
Payer: COMMERCIAL

## 2023-12-07 VITALS
BODY MASS INDEX: 31.67 KG/M2 | WEIGHT: 196.19 LBS | HEART RATE: 70 BPM | TEMPERATURE: 98.3 F | OXYGEN SATURATION: 98 % | DIASTOLIC BLOOD PRESSURE: 98 MMHG | SYSTOLIC BLOOD PRESSURE: 150 MMHG

## 2023-12-07 DIAGNOSIS — F41.9 ANXIETY: Primary | ICD-10-CM

## 2023-12-07 DIAGNOSIS — E66.811 CLASS 1 OBESITY DUE TO EXCESS CALORIES WITH SERIOUS COMORBIDITY AND BODY MASS INDEX (BMI) OF 32.0 TO 32.9 IN ADULT: ICD-10-CM

## 2023-12-07 DIAGNOSIS — I10 BENIGN ESSENTIAL HYPERTENSION: ICD-10-CM

## 2023-12-07 DIAGNOSIS — Z90.49 S/P RIGHT HEMICOLECTOMY: ICD-10-CM

## 2023-12-07 DIAGNOSIS — K64.4 EXTERNAL HEMORRHOIDS: ICD-10-CM

## 2023-12-07 DIAGNOSIS — E78.5 HYPERLIPIDEMIA LDL GOAL <100: ICD-10-CM

## 2023-12-07 DIAGNOSIS — Z00.00 ROUTINE HISTORY AND PHYSICAL EXAMINATION OF ADULT: ICD-10-CM

## 2023-12-07 DIAGNOSIS — Z12.5 SCREENING FOR PROSTATE CANCER: ICD-10-CM

## 2023-12-07 DIAGNOSIS — E66.09 CLASS 1 OBESITY DUE TO EXCESS CALORIES WITH SERIOUS COMORBIDITY AND BODY MASS INDEX (BMI) OF 32.0 TO 32.9 IN ADULT: ICD-10-CM

## 2023-12-07 DIAGNOSIS — D12.6 COLON ADENOMA: ICD-10-CM

## 2023-12-07 PROCEDURE — 99396 PREV VISIT EST AGE 40-64: CPT | Performed by: STUDENT IN AN ORGANIZED HEALTH CARE EDUCATION/TRAINING PROGRAM

## 2023-12-07 RX ORDER — LOSARTAN POTASSIUM 50 MG/1
50 TABLET ORAL DAILY
Qty: 30 TABLET | Refills: 0 | Status: SHIPPED | OUTPATIENT
Start: 2023-12-07 | End: 2024-01-03

## 2023-12-07 ASSESSMENT — ENCOUNTER SYMPTOMS
CHILLS: 0
NERVOUS/ANXIOUS: 1
FEVER: 0
COUGH: 0
ABDOMINAL PAIN: 0
WEAKNESS: 0
HEMATOCHEZIA: 0
NERVOUS/ANXIOUS: 0
DYSURIA: 0
PARESTHESIAS: 0
HEMATURIA: 0
JOINT SWELLING: 0
ARTHRALGIAS: 0
EYE PAIN: 0
HEADACHES: 0
DIZZINESS: 0
MYALGIAS: 0
CONSTIPATION: 0
SHORTNESS OF BREATH: 0
PALPITATIONS: 0
NAUSEA: 0
FREQUENCY: 0
DIARRHEA: 0
SORE THROAT: 0
HEARTBURN: 0

## 2023-12-07 ASSESSMENT — PAIN SCALES - GENERAL: PAINLEVEL: NO PAIN (0)

## 2023-12-07 NOTE — PROGRESS NOTES
SUBJECTIVE:   Jay Jay is a 48 year old, presenting for the following:  Physical, Hypertension, and Anxiety        12/7/2023     3:50 PM   Additional Questions   Roomed by Alka Mccain   Accompanied by None         12/7/2023     3:50 PM   Patient Reported Additional Medications   Patient reports taking the following new medications None       Healthy Habits:     Getting at least 3 servings of Calcium per day:  Yes    Bi-annual eye exam:  Yes    Dental care twice a year:  Yes    Sleep apnea or symptoms of sleep apnea:  None    Diet:  Regular (no restrictions)    Frequency of exercise:  4-5 days/week    Duration of exercise:  45-60 minutes    Taking medications regularly:  Yes    Medication side effects:  None    Additional concerns today:  No      /100 on repeat  Average is 150/95  Back at work- lab work at the [employer]  Intermittent crampy abdominal pain- has been more the last few days.  Comes for a little while.  Today not so much  Yesterday- no changes in bowel movement- no diarrhea No constipation.        Hypertension Follow-up    Do you check your blood pressure regularly outside of the clinic? Yes   Are you following a low salt diet? No  Are your blood pressures ever more than 140 on the top number (systolic) OR more   than 90 on the bottom number (diastolic), for example 140/90? Yes    Anxiety Follow-Up  How are you doing with your anxiety since your last visit? Improved   Are you having other symptoms that might be associated with anxiety? No  Have you had a significant life event? No   Are you feeling depressed? No  Do you have any concerns with your use of alcohol or other drugs? No    Social History     Tobacco Use    Smoking status: Some Days     Types: Cigarettes    Smokeless tobacco: Never    Tobacco comments:     no passive exposure   Vaping Use    Vaping Use: Never used   Substance Use Topics    Alcohol use: No    Drug use: No         11/3/2023    12:49 PM   ANEL-7 SCORE   Total Score 3 (minimal  "anxiety)   Total Score 3         5/11/2016    10:03 AM 3/31/2021     8:00 AM 11/3/2023    12:47 PM   PHQ   PHQ-9 Total Score 0 3 2   Q9: Thoughts of better off dead/self-harm past 2 weeks Not at all Not at all Not at all         11/3/2023    12:47 PM   Last PHQ-9   1.  Little interest or pleasure in doing things 0   2.  Feeling down, depressed, or hopeless 0   3.  Trouble falling or staying asleep, or sleeping too much 1   4.  Feeling tired or having little energy 1   5.  Poor appetite or overeating 0   6.  Feeling bad about yourself 0   7.  Trouble concentrating 0   8.  Moving slowly or restless 0   Q9: Thoughts of better off dead/self-harm past 2 weeks 0   PHQ-9 Total Score 2         11/3/2023    12:49 PM   ANEL-7    1. Feeling nervous, anxious, or on edge 1   2. Not being able to stop or control worrying 0   3. Worrying too much about different things 0   4. Trouble relaxing 1   5. Being so restless that it is hard to sit still 1   6. Becoming easily annoyed or irritable 0   7. Feeling afraid, as if something awful might happen 0   ANEL-7 Total Score 3   If you checked any problems, how difficult have they made it for you to do your work, take care of things at home, or get along with other people? Not difficult at all     Have you ever done Advance Care Planning? (For example, a Health Directive, POLST, or a discussion with a medical provider or your loved ones about your wishes): No, advance care planning information given to patient to review.  Patient declined advance care planning discussion at this time.    Social History     Tobacco Use    Smoking status: Some Days     Types: Cigarettes    Smokeless tobacco: Never    Tobacco comments:     no passive exposure   Substance Use Topics    Alcohol use: No           12/7/2023     3:38 PM   Alcohol Use   Prescreen: >3 drinks/day or >7 drinks/week? No     Last PSA: No results found for: \"PSA\"    Reviewed orders with patient. Reviewed health maintenance and updated " orders accordingly  Reviewed and updated as needed this visit by clinical staff   Tobacco  Allergies  Meds              Reviewed and updated as needed this visit by Provider                 Past Medical History:   Diagnosis Date    Acute sinusitis, unspecified 10/09/2006    Polyp of ascending colon       Past Surgical History:   Procedure Laterality Date    ADENOIDECTOMY  04/01/2006    COLONOSCOPY N/A 09/14/2023    Procedure: COLONOSCOPY WITH POLYPECTOMY;  Surgeon: Jez Alvarez MD;  Location: [facility]    DISKECTOMY, LUMBAR, ADDTNL SP      FESS  02/01/2010    TONSILLECTOMY  04/01/2006    ventilation tubes, bilateral x 2  04/01/2006       Review of Systems   Constitutional:  Negative for chills and fever.   HENT:  Negative for congestion, ear pain, hearing loss and sore throat.    Eyes:  Negative for pain and visual disturbance.   Respiratory:  Negative for cough and shortness of breath.    Cardiovascular:  Negative for chest pain, palpitations and peripheral edema.   Gastrointestinal:  Negative for abdominal pain, constipation, diarrhea, heartburn, hematochezia and nausea.   Genitourinary:  Negative for dysuria, frequency, genital sores, hematuria, impotence, penile discharge and urgency.   Musculoskeletal:  Negative for arthralgias, joint swelling and myalgias.   Skin:  Negative for rash.   Neurological:  Negative for dizziness, weakness, headaches and paresthesias.   Psychiatric/Behavioral:  Negative for mood changes. The patient is nervous/anxious.      CONSTITUTIONAL: NEGATIVE for fever, chills, change in weight  INTEGUMENTARY/SKIN: NEGATIVE for worrisome rashes, moles or lesions  EYES: NEGATIVE for vision changes or irritation  ENT: NEGATIVE for ear, mouth and throat problems  RESP: NEGATIVE for significant cough or SOB  CV: NEGATIVE for chest pain, palpitations or peripheral edema  GI: NEGATIVE for nausea, abdominal pain, heartburn, or change in bowel habits   male: negative for dysuria, hematuria,  decreased urinary stream, erectile dysfunction, urethral discharge  MUSCULOSKELETAL: NEGATIVE for significant arthralgias or myalgia  NEURO: NEGATIVE for weakness, dizziness or paresthesias  PSYCHIATRIC: NEGATIVE for changes in mood or affect    OBJECTIVE:   BP (!) 157/109 (BP Location: Left arm, Patient Position: Sitting, Cuff Size: Adult Regular)   Pulse 70   Temp 98.3  F (36.8  C) (Tympanic)   Wt 89 kg (196 lb 3 oz)   SpO2 98%   BMI 31.67 kg/m      Physical Exam  GENERAL: healthy, alert and no distress  EYES: Eyes grossly normal to inspection, PERRL and conjunctivae and sclerae normal  HENT: ear canals and TM's normal, nose and mouth without ulcers or lesions  NECK: no adenopathy, no asymmetry, masses, or scars and thyroid normal to palpation  RESP: lungs clear to auscultation - no rales, rhonchi or wheezes  CV: regular rate and rhythm, normal S1 S2, no S3 or S4, no murmur, click or rub, no peripheral edema and peripheral pulses strong  ABDOMEN: soft, nontender, no hepatosplenomegaly, no masses and bowel sounds normal  MS: no gross musculoskeletal defects noted, no edema  SKIN: no suspicious lesions or rashes  NEURO: Normal strength and tone, mentation intact and speech normal  PSYCH: mentation appears normal, affect normal/bright  : prostate is smooth.  External hemorrhoids noted.    ASSESSMENT/PLAN:       ICD-10-CM    1. Anxiety  F41.9       2. S/P right hemicolectomy  Z90.49       3. Benign essential hypertension  I10 CBC with platelets and differential     Comprehensive metabolic panel     UA with Microscopic     losartan (COZAAR) 50 MG tablet      4. Hyperlipidemia LDL goal <100  E78.5 Lipid Profile (Chol, Trig, HDL, LDL calc)      5. Screening for prostate cancer  Z12.5 PSA, screen      6. Routine history and physical examination of adult  Z00.00       7. Colon adenoma  D12.6       8. Class 1 obesity due to excess calories with serious comorbidity and body mass index (BMI) of 32.0 to 32.9 in adult   "E66.09     Z68.32       9. External hemorrhoids  K64.4           Jay Jay is here for his physical.  He is still recovering from his R hemicolectomy.  He tells me that his bowel movements are normal and he has no significant abdominal pain.  He will need a repeat colonoscopy in 1 year.  He is eating well.  His appetite and weight have been stable.  He has external hemorrhoids that are not actively bothering him.  We did discuss management should they become an issue- emphasized high fiber diet and adequate hydration to promote regular bowel movements.  His BP is good today.  We will repeat fasting labs.  He is agreeable to PSA screening.  He declines vaccines today after my counseling.      COUNSELING:   Reviewed preventive health counseling, as reflected in patient instructions       Regular exercise       Healthy diet/nutrition       Vision screening       Hearing screening       Alcohol Use        Colorectal cancer screening       Prostate cancer screening       Osteoporosis prevention/bone health      BMI:   Estimated body mass index is 31.67 kg/m  as calculated from the following:    Height as of 10/31/23: 1.676 m (5' 6\").    Weight as of this encounter: 89 kg (196 lb 3 oz).   Weight management plan: Discussed healthy diet and exercise guidelines      He reports that he has been smoking cigarettes. He has never used smokeless tobacco.  Nicotine/Tobacco Cessation Plan:   Self help information given to patient            Liliana Braga MD  [facility] - [place]  "

## 2023-12-13 ENCOUNTER — LAB (OUTPATIENT)
Dept: LAB | Facility: OTHER | Age: 48
End: 2023-12-13
Attending: STUDENT IN AN ORGANIZED HEALTH CARE EDUCATION/TRAINING PROGRAM
Payer: COMMERCIAL

## 2023-12-13 ENCOUNTER — ALLIED HEALTH/NURSE VISIT (OUTPATIENT)
Dept: FAMILY MEDICINE | Facility: OTHER | Age: 48
End: 2023-12-13
Attending: STUDENT IN AN ORGANIZED HEALTH CARE EDUCATION/TRAINING PROGRAM
Payer: COMMERCIAL

## 2023-12-13 VITALS — SYSTOLIC BLOOD PRESSURE: 130 MMHG | DIASTOLIC BLOOD PRESSURE: 87 MMHG

## 2023-12-13 DIAGNOSIS — I10 HYPERTENSION, UNSPECIFIED TYPE: Primary | ICD-10-CM

## 2023-12-13 DIAGNOSIS — I10 BENIGN ESSENTIAL HYPERTENSION: ICD-10-CM

## 2023-12-13 DIAGNOSIS — Z12.5 SCREENING FOR PROSTATE CANCER: ICD-10-CM

## 2023-12-13 DIAGNOSIS — E78.5 HYPERLIPIDEMIA LDL GOAL <100: ICD-10-CM

## 2023-12-13 LAB
ALBUMIN SERPL BCG-MCNC: 4.2 G/DL (ref 3.5–5.2)
ALBUMIN UR-MCNC: NEGATIVE MG/DL
ALP SERPL-CCNC: 99 U/L (ref 40–150)
ALT SERPL W P-5'-P-CCNC: 39 U/L (ref 0–70)
ANION GAP SERPL CALCULATED.3IONS-SCNC: 10 MMOL/L (ref 7–15)
APPEARANCE UR: CLEAR
AST SERPL W P-5'-P-CCNC: 25 U/L (ref 0–45)
BASOPHILS # BLD AUTO: 0 10E3/UL (ref 0–0.2)
BASOPHILS NFR BLD AUTO: 0 %
BILIRUB SERPL-MCNC: 0.7 MG/DL
BILIRUB UR QL STRIP: NEGATIVE
BUN SERPL-MCNC: 12.5 MG/DL (ref 6–20)
CALCIUM SERPL-MCNC: 9.3 MG/DL (ref 8.6–10)
CHLORIDE SERPL-SCNC: 103 MMOL/L (ref 98–107)
CHOLEST SERPL-MCNC: 203 MG/DL
COLOR UR AUTO: ABNORMAL
CREAT SERPL-MCNC: 0.84 MG/DL (ref 0.67–1.17)
DEPRECATED HCO3 PLAS-SCNC: 25 MMOL/L (ref 22–29)
EGFRCR SERPLBLD CKD-EPI 2021: >90 ML/MIN/1.73M2
EOSINOPHIL # BLD AUTO: 0.2 10E3/UL (ref 0–0.7)
EOSINOPHIL NFR BLD AUTO: 3 %
ERYTHROCYTE [DISTWIDTH] IN BLOOD BY AUTOMATED COUNT: 12.9 % (ref 10–15)
FASTING STATUS PATIENT QL REPORTED: YES
GLUCOSE SERPL-MCNC: 113 MG/DL (ref 70–99)
GLUCOSE UR STRIP-MCNC: NEGATIVE MG/DL
HCT VFR BLD AUTO: 46.7 % (ref 40–53)
HDLC SERPL-MCNC: 50 MG/DL
HGB BLD-MCNC: 15.9 G/DL (ref 13.3–17.7)
HGB UR QL STRIP: NEGATIVE
IMM GRANULOCYTES # BLD: 0 10E3/UL
IMM GRANULOCYTES NFR BLD: 1 %
KETONES UR STRIP-MCNC: NEGATIVE MG/DL
LDLC SERPL CALC-MCNC: 126 MG/DL
LEUKOCYTE ESTERASE UR QL STRIP: NEGATIVE
LYMPHOCYTES # BLD AUTO: 2.3 10E3/UL (ref 0.8–5.3)
LYMPHOCYTES NFR BLD AUTO: 35 %
MCH RBC QN AUTO: 28.4 PG (ref 26.5–33)
MCHC RBC AUTO-ENTMCNC: 34 G/DL (ref 31.5–36.5)
MCV RBC AUTO: 83 FL (ref 78–100)
MONOCYTES # BLD AUTO: 0.5 10E3/UL (ref 0–1.3)
MONOCYTES NFR BLD AUTO: 7 %
MUCOUS THREADS #/AREA URNS LPF: PRESENT /LPF
NEUTROPHILS # BLD AUTO: 3.6 10E3/UL (ref 1.6–8.3)
NEUTROPHILS NFR BLD AUTO: 54 %
NITRATE UR QL: NEGATIVE
NONHDLC SERPL-MCNC: 153 MG/DL
NRBC # BLD AUTO: 0 10E3/UL
NRBC BLD AUTO-RTO: 0 /100
PH UR STRIP: 7 [PH] (ref 4.7–8)
PLATELET # BLD AUTO: 236 10E3/UL (ref 150–450)
POTASSIUM SERPL-SCNC: 4.2 MMOL/L (ref 3.4–5.3)
PROT SERPL-MCNC: 6.9 G/DL (ref 6.4–8.3)
PSA SERPL DL<=0.01 NG/ML-MCNC: 0.58 NG/ML (ref 0–3.5)
RBC # BLD AUTO: 5.6 10E6/UL (ref 4.4–5.9)
RBC URINE: <1 /HPF
SODIUM SERPL-SCNC: 138 MMOL/L (ref 135–145)
SP GR UR STRIP: 1.02 (ref 1–1.03)
SQUAMOUS EPITHELIAL: 0 /HPF
TRIGL SERPL-MCNC: 135 MG/DL
UROBILINOGEN UR STRIP-MCNC: NORMAL MG/DL
WBC # BLD AUTO: 6.7 10E3/UL (ref 4–11)
WBC URINE: 1 /HPF

## 2023-12-13 PROCEDURE — 99207 PR NO CHARGE NURSE ONLY: CPT

## 2023-12-13 PROCEDURE — 85025 COMPLETE CBC W/AUTO DIFF WBC: CPT

## 2023-12-13 PROCEDURE — G0103 PSA SCREENING: HCPCS

## 2023-12-13 PROCEDURE — 36415 COLL VENOUS BLD VENIPUNCTURE: CPT

## 2023-12-13 PROCEDURE — 80061 LIPID PANEL: CPT

## 2023-12-13 PROCEDURE — 80053 COMPREHEN METABOLIC PANEL: CPT

## 2023-12-13 PROCEDURE — 81001 URINALYSIS AUTO W/SCOPE: CPT

## 2023-12-13 NOTE — PROGRESS NOTES
Patient presented for Hypertension. Blood pressure was checked and documented: 137/90. Instructed patient to wait 10 minutes as we will recheck. Recheck pressure reading : 130/87. Patient was scheduled to see provider 12/29/2023 at 9:40am. Patient left.

## 2023-12-28 NOTE — PROGRESS NOTES
Assessment & Plan     Essential hypertension  BP in target range.    Continue losartan 50 mg daily    Snoring  Reports symptoms of daytime tiredness, snoring, pauses in breathing. Strongly recommend evaluation for sleep apnea  - Adult Sleep Eval & Management  Referral; Future    Anxiety  Well controlled.  Continue present management with zoloft 50 mg daily    Hyperlipidemia LDL goal <100  LDL elevated at 126.  ASCVD risk of 9.1 which is intermediate.  Will continue to monitor  Recommend lifestyle and dietary changes for now.  Mediterranean diet with regular moderate intensity exercise.      Class 1 obesity due to excess calories with serious comorbidity and body mass index (BMI) of 32.0 to 32.9 in adult  Strongly encourage lifestyle and dietary changes to promote healthy weight.  Patient is motivated to lose weight      Return in about 6 months (around 6/29/2024) for Follow up.    Liliana Braga MD  [facility] - YASMINE Goyal is a 48 year old, presenting for the following health issues:  RECHECK and Hypertension        12/29/2023     9:33 AM   Additional Questions   Roomed by Alka Mccain   Accompanied by Spouse         12/29/2023     9:33 AM   Patient Reported Additional Medications   Patient reports taking the following new medications None       HPI     Family    Hypertension Follow-up    Do you check your blood pressure regularly outside of the clinic? No   Are you following a low salt diet? No  Are your blood pressures ever more than 140 on the top number (systolic) OR more   than 90 on the bottom number (diastolic), for example 140/90? Unknown patient has not been checking blood pressure outside of the clinic recently         Review of Systems   Constitutional, HEENT, cardiovascular, pulmonary, GI, , musculoskeletal, neuro, skin, endocrine and psych systems are negative, except as otherwise noted.      Objective    /86 (BP Location: Left arm, Patient  Position: Sitting, Cuff Size: Adult Large)   Pulse 64   Temp 97.8  F (36.6  C) (Tympanic)   Wt 90.3 kg (199 lb 2 oz)   SpO2 97%   BMI 32.14 kg/m    Body mass index is 32.14 kg/m .  Physical Exam   GENERAL: healthy, alert and no distress  EYES: Eyes grossly normal to inspection, PERRL and conjunctivae and sclerae normal  HENT: ear canals and TM's normal, nose and mouth without ulcers or lesions  NECK: no adenopathy, no asymmetry, masses, or scars and thyroid normal to palpation  RESP: lungs clear to auscultation - no rales, rhonchi or wheezes  CV: regular rate and rhythm, normal S1 S2, no S3 or S4, no murmur, click or rub, no peripheral edema and peripheral pulses strong  ABDOMEN: soft, nontender, no hepatosplenomegaly, no masses and bowel sounds normal  MS: no gross musculoskeletal defects noted, no edema  SKIN: no suspicious lesions or rashes  NEURO: Normal strength and tone, mentation intact and speech normal  PSYCH: mentation appears normal, affect normal/bright

## 2023-12-29 ENCOUNTER — OFFICE VISIT (OUTPATIENT)
Dept: FAMILY MEDICINE | Facility: OTHER | Age: 48
End: 2023-12-29
Attending: STUDENT IN AN ORGANIZED HEALTH CARE EDUCATION/TRAINING PROGRAM
Payer: COMMERCIAL

## 2023-12-29 VITALS
BODY MASS INDEX: 32.14 KG/M2 | SYSTOLIC BLOOD PRESSURE: 128 MMHG | WEIGHT: 199.13 LBS | DIASTOLIC BLOOD PRESSURE: 86 MMHG | TEMPERATURE: 97.8 F | HEART RATE: 64 BPM | OXYGEN SATURATION: 97 %

## 2023-12-29 DIAGNOSIS — E66.811 CLASS 1 OBESITY DUE TO EXCESS CALORIES WITH SERIOUS COMORBIDITY AND BODY MASS INDEX (BMI) OF 32.0 TO 32.9 IN ADULT: ICD-10-CM

## 2023-12-29 DIAGNOSIS — R06.83 SNORING: Primary | ICD-10-CM

## 2023-12-29 DIAGNOSIS — E78.5 HYPERLIPIDEMIA LDL GOAL <100: ICD-10-CM

## 2023-12-29 DIAGNOSIS — E66.09 CLASS 1 OBESITY DUE TO EXCESS CALORIES WITH SERIOUS COMORBIDITY AND BODY MASS INDEX (BMI) OF 32.0 TO 32.9 IN ADULT: ICD-10-CM

## 2023-12-29 DIAGNOSIS — I10 ESSENTIAL HYPERTENSION: ICD-10-CM

## 2023-12-29 DIAGNOSIS — F41.9 ANXIETY: ICD-10-CM

## 2023-12-29 PROCEDURE — 99214 OFFICE O/P EST MOD 30 MIN: CPT | Performed by: STUDENT IN AN ORGANIZED HEALTH CARE EDUCATION/TRAINING PROGRAM

## 2023-12-29 ASSESSMENT — PAIN SCALES - GENERAL: PAINLEVEL: NO PAIN (0)

## 2024-01-02 ENCOUNTER — PATIENT OUTREACH (OUTPATIENT)
Dept: GASTROENTEROLOGY | Facility: CLINIC | Age: 49
End: 2024-01-02

## 2024-01-03 ENCOUNTER — MYC MEDICAL ADVICE (OUTPATIENT)
Dept: PULMONOLOGY | Facility: OTHER | Age: 49
End: 2024-01-03

## 2024-01-03 DIAGNOSIS — I10 BENIGN ESSENTIAL HYPERTENSION: ICD-10-CM

## 2024-01-03 RX ORDER — LOSARTAN POTASSIUM 50 MG/1
50 TABLET ORAL DAILY
Qty: 30 TABLET | Refills: 0 | Status: SHIPPED | OUTPATIENT
Start: 2024-01-03 | End: 2024-01-31

## 2024-01-31 DIAGNOSIS — F41.9 ANXIETY: ICD-10-CM

## 2024-01-31 DIAGNOSIS — I10 BENIGN ESSENTIAL HYPERTENSION: ICD-10-CM

## 2024-01-31 RX ORDER — LOSARTAN POTASSIUM 50 MG/1
50 TABLET ORAL DAILY
Qty: 30 TABLET | Refills: 0 | Status: SHIPPED | OUTPATIENT
Start: 2024-01-31 | End: 2024-02-28

## 2024-01-31 NOTE — TELEPHONE ENCOUNTER
Zoloft      Last Written Prescription Date:  11/3/23  Last Fill Quantity: 90,   # refills: 0  Last Office Visit: 12/29/23  Future Office visit:       Routing refill request to provider for review/approval because:

## 2024-01-31 NOTE — TELEPHONE ENCOUNTER
Cozaar      Last Written Prescription Date:  01/03/24  Last Fill Quantity: 30,   # refills: 0  Last Office Visit: 12/29/23  Future Office visit:

## 2024-02-24 ASSESSMENT — SLEEP AND FATIGUE QUESTIONNAIRES
HOW LIKELY ARE YOU TO NOD OFF OR FALL ASLEEP WHILE LYING DOWN TO REST IN THE AFTERNOON WHEN CIRCUMSTANCES PERMIT: MODERATE CHANCE OF DOZING
HOW LIKELY ARE YOU TO NOD OFF OR FALL ASLEEP WHEN YOU ARE A PASSENGER IN A CAR FOR AN HOUR WITHOUT A BREAK: WOULD NEVER DOZE
HOW LIKELY ARE YOU TO NOD OFF OR FALL ASLEEP WHILE SITTING AND READING: SLIGHT CHANCE OF DOZING
HOW LIKELY ARE YOU TO NOD OFF OR FALL ASLEEP WHILE WATCHING TV: HIGH CHANCE OF DOZING
HOW LIKELY ARE YOU TO NOD OFF OR FALL ASLEEP WHILE SITTING INACTIVE IN A PUBLIC PLACE: SLIGHT CHANCE OF DOZING
HOW LIKELY ARE YOU TO NOD OFF OR FALL ASLEEP WHILE SITTING QUIETLY AFTER LUNCH WITHOUT ALCOHOL: SLIGHT CHANCE OF DOZING
HOW LIKELY ARE YOU TO NOD OFF OR FALL ASLEEP WHILE SITTING AND TALKING TO SOMEONE: WOULD NEVER DOZE
HOW LIKELY ARE YOU TO NOD OFF OR FALL ASLEEP IN A CAR, WHILE STOPPED FOR A FEW MINUTES IN TRAFFIC: WOULD NEVER DOZE

## 2024-02-26 NOTE — PROGRESS NOTES
02/24/24 1520   Reason For Your Visit   Please briefly describe the main reason(s) for your sleep visit Recommended by my Dr.   Approximately when did this problem start It has been several years   What are your goals for this visit To find out if there is anything that can be done to help me sleep better.   Time in Bed - Work Or School Days   Do you work or go to school Yes   What time do you usually get into bed 9 pm   About how long does it take you to fall asleep About a half hour to an hour   How often do you have trouble falling asleep 2-3   How often do you wake up during the night 2 or 3 times   Do you work days/evenings/nights/rotating shifts Days   What wakes you up at night Snorting self awake;Use the bathroom   How often do you have trouble falling back to sleep About 3   About how long does it take to fall back to sleep About an hour   What time do you usually get out of bed to start your day 5am   Do you use an alarm Yes   Time in Bed - Weekends/Non-work Days/All Other Days   What time do you usually get into bed Between 9 and 11pm   About how long does it take you to fall asleep About an hour   What time do you usually get out of bed to start your day Between 7 and 8 am   Do you use an alarm No   Sleep Need   On average, about how much sleep do you think you get 4 to 5 hours   About how much sleep do you think you need 6 to 8 hours   Sleep Position   Which sleep positions do you prefer Side   How often do you take a nap on purpose 1 or 2   Do you feel better after naps Yes   How often do you doze off unintentionally About 4   Have you ever had a driving accident or near-miss due to sleepiness/drowsiness No   Sleep Disruptions - Breathing/Snoring   Do you snore Yes   Do other people complain about your snoring Yes   Have you been told you stop breathing in your sleep No   Do you have issues with any of the following Getting up to urinate more than once   Sleep Disruptions - Movement   Do you get  pain, discomfort, with an urge to move No   Does it happen when you are resting No   Does it get better if you move around No   Does it happen more at night No   Have you been told you kick your legs at night No   Sleep Disruptions - Behaviours in Sleep   Do you ever experience sudden muscle weakness during the day No   Caffeine, Alcohol and Other Substances   How many caffeinated beverages (coffee, tea, soda, energy drinks) per day About 2-3   What time of day is your last caffeine use Around 7pm   Do you drink alcohol to help you sleep No   Do you drink alcohol near bedtime No   Family History   Has any family member been diagnosed with a sleep disorder No   In the last TWO WEEKS have you experienced any of the following symptoms?   Fevers No   Night Sweats No   Weight Gain No   Pain at Night No   Double Vision No   Changes in Vision No   Difficulty Breathing through Nose No   Sore Throat in Morning No   Dry Mouth in the Morning No   Shortness of Breath Lying Flat No   Shortness of Breath With Activity No   Awakening with Shortness of Breath No   Increased Cough No   Losing Control of Urine at Night No   Joint Pains at Night No   Headaches in Morning No   Weakness in Arms or Legs No   Depressed Mood No   Anxiety Yes           2/24/2024     3:22 PM    Hines Sleepiness Scale ( EMILY Mak  [id number]<br>ESS - [place]/English - Final version - 21 Nov 07 - [facility] [place].)   Sitting and reading Slight chance of dozing   Watching TV High chance of dozing   Sitting, inactive in a public place (e.g. a theatre or a meeting) Slight chance of dozing   As a passenger in a car for an hour without a break Would never doze   Lying down to rest in the afternoon when circumstances permit Moderate chance of dozing   Sitting and talking to someone Would never doze   Sitting quietly after a lunch without alcohol Slight chance of dozing   In a car, while stopped for a few minutes in traffic Would never doze   Hines Score (MC)  8   [place] Score (Sleep) 8         2/24/2024     3:08 PM   Insomnia Severity Index (GALEN)   Difficulty falling asleep 1   Difficulty staying asleep 2   Problems waking up too early 2   How SATISFIED/DISSATISFIED are you with your CURRENT sleep pattern? 3   How NOTICEABLE to others do you think your sleep problem is in terms of impairing the quality of your life? 2   How WORRIED/DISTRESSED are you about your current sleep problem? 2   To what extent do you consider your sleep problem to INTERFERE with your daily functioning (e.g. daytime fatigue, mood, ability to function at work/daily chores, concentration, memory, mood, etc.) CURRENTLY? 2   GALEN Total Score 14         2/24/2024     3:21 PM   STOP BANG Questionnaire (  2008, the American Society of Anesthesiologists, Inc. [employer]   1. Snoring - Do you snore loudly (louder than talking or loud enough to be heard through closed doors)? No   2. Tired - Do you often feel tired, fatigued, or sleepy during daytime? No   3. Observed - Has anyone observed you stop breathing during your sleep? No   4. Blood pressure - Do you have or are you being treated for high blood pressure? Yes   5. BMI - BMI more than 35 kg/m2? Yes   6. Age - Age over 50 yr old? Yes   7. Neck circumference - Neck circumference greater than 40 cm? Yes   8. Gender - Gender male? Yes   STOP BANG Score (MC): 4 (High risk of NAVIN)

## 2024-02-26 NOTE — TELEPHONE ENCOUNTER
"Chart review prior to sleep testing.    Patient Summary:  48 year old male who is referred for sleep disordered breathing.    Patient Active Problem List    Diagnosis Date Noted    Anxiety 11/03/2023     Priority: Medium    S/P right hemicolectomy 11/03/2023     Priority: Medium    Benign essential hypertension 03/11/2020     Priority: Medium    NAVIN (obstructive sleep apnea) 03/11/2020     Priority: Medium    Other chest pain 03/11/2020     Priority: Medium    Left axis deviation 03/11/2020     Priority: Medium    Lumbar disc herniation 05/23/2016     Priority: Medium    Low back pain, left L4-L5 radiculopathy 02/24/2016     Priority: Medium    Seasonal allergic rhinitis 08/25/2015     Priority: Medium       Current Outpatient Medications   Medication    cetirizine (ZYRTEC) 10 MG tablet    losartan (COZAAR) 25 MG tablet    losartan (COZAAR) 50 MG tablet    sertraline (ZOLOFT) 50 MG tablet     No current facility-administered medications for this visit.       Pertinent PMHx of hypertension, obstructive sleep apnea, anxiety.    STOP-BANG score of 4, with unknown neck circumference.  [place] score of 8.  GALEN: 14    BMI of Estimated body mass index is 32.14 kg/m  as calculated from the following:    Height as of 10/31/23: 1.676 m (5' 6\").    Weight as of 12/29/23: 90.3 kg (199 lb 2 oz).     Chief concern per questionnaire: \"Recommended by my Dr. \"    Duration of symptoms:  \"It has been several years \"    Goals for visit per questionnaire: \"To find out if there is anything that can be done to help me sleep better. \"    Sleep pattern:  Workdays.  9pm - 5am.  Weekends.  9-11pm to 7-8am.  Time to fall asleep: ~30-60 minutes.  Awakenings: 2-3 times per night, 60 minutes to return to sleep.  Average total sleep time:  4-5 hours  Napping.  1-2 days per week, ? hours per nap.    No for RLS screen.  No for sleep walking.  No for dream enactment behavior.  No for bruxism.    No for morning headaches.  Yes for snoring.  No for " observed apnea.  No for FHx of NAVIN.    Caffeine use:  No for 3+ per day.  Yes for within 6 hours of bed.    A/P:    1.)  High likelihood of NAVIN with STOP-BANG score of 4.   - Would appear to be candidate for either home sleep testing or in-lab PSG.    ---  This note was written with the assistance of the Dragon voice-dictation technology software. The final document, although reviewed, may contain errors. For corrections, please contact the office.    Maxwell Corrigan MD    Sleep Medicine  [facility] [place], [place]  Main Office: [phone]  [facility] [address], [address]  Schedule visits: [phone]  Main Office: [phone]  Fax: [phone]

## 2024-02-27 ENCOUNTER — TELEPHONE (OUTPATIENT)
Dept: PULMONOLOGY | Facility: OTHER | Age: 49
End: 2024-02-27

## 2024-02-28 DIAGNOSIS — I10 BENIGN ESSENTIAL HYPERTENSION: ICD-10-CM

## 2024-02-28 DIAGNOSIS — F41.9 ANXIETY: ICD-10-CM

## 2024-02-28 RX ORDER — LOSARTAN POTASSIUM 50 MG/1
50 TABLET ORAL DAILY
Qty: 30 TABLET | Refills: 0 | Status: SHIPPED | OUTPATIENT
Start: 2024-02-28 | End: 2024-04-01

## 2024-02-28 NOTE — TELEPHONE ENCOUNTER
Losartan (Cozaar) 50 MG tablet    Last Written Prescription Date:  01/31/2024  Last Fill Quantity: 30,   # refills: 0  Last Office Visit: 12/29/2023      Sertraline (Zoloft) 50 MG tablet    Last Written Prescription Date:  01/31/2024  Last Fill Quantity: 30,   # refills: 0

## 2024-03-29 DIAGNOSIS — F41.9 ANXIETY: ICD-10-CM

## 2024-03-29 DIAGNOSIS — I10 BENIGN ESSENTIAL HYPERTENSION: ICD-10-CM

## 2024-04-01 RX ORDER — LOSARTAN POTASSIUM 50 MG/1
50 TABLET ORAL DAILY
Qty: 30 TABLET | Refills: 5 | Status: SHIPPED | OUTPATIENT
Start: 2024-04-01 | End: 2024-09-13

## 2024-04-08 ENCOUNTER — HOSPITAL ENCOUNTER (EMERGENCY)
Facility: HOSPITAL | Age: 49
Discharge: HOME | End: 2024-04-08
Payer: COMMERCIAL

## 2024-04-08 VITALS
RESPIRATION RATE: 18 BRPM | TEMPERATURE: 99.3 F | OXYGEN SATURATION: 96 % | WEIGHT: 190 LBS | HEART RATE: 86 BPM | BODY MASS INDEX: 30.67 KG/M2 | SYSTOLIC BLOOD PRESSURE: 171 MMHG | DIASTOLIC BLOOD PRESSURE: 109 MMHG

## 2024-04-08 DIAGNOSIS — J01.90 ACUTE SINUSITIS WITH SYMPTOMS > 10 DAYS: ICD-10-CM

## 2024-04-08 DIAGNOSIS — H66.93 BILATERAL ACUTE OTITIS MEDIA: ICD-10-CM

## 2024-04-08 PROCEDURE — 99213 OFFICE O/P EST LOW 20 MIN: CPT

## 2024-04-08 PROCEDURE — G0463 HOSPITAL OUTPT CLINIC VISIT: HCPCS

## 2024-04-08 RX ORDER — FLUTICASONE PROPIONATE 50 MCG
1 SPRAY, SUSPENSION (ML) NASAL DAILY
Qty: 16 G | Refills: 0 | Status: SHIPPED | OUTPATIENT
Start: 2024-04-08 | End: 2024-04-13

## 2024-04-08 ASSESSMENT — ENCOUNTER SYMPTOMS
COUGH: 1
HEADACHES: 1
SINUS PAIN: 1
FEVER: 1
APPETITE CHANGE: 0
SORE THROAT: 0
ACTIVITY CHANGE: 0
SINUS PRESSURE: 1

## 2024-04-08 NOTE — ED TRIAGE NOTES
Pt presents with c/o having increased sinus pain and pressure   Pt reports bilateral ear and is now having a headache   S/x started a week in a half ago   Pt reports does take meds for b/p but has not yet today   No otc meds taken today

## 2024-04-08 NOTE — DISCHARGE INSTRUCTIONS
Antibiotics 2 times daily for 7 days. Probiotics while taking to support gut lorenzo.   Flonase once daily for 5 days.   Push fluids.   Tylenol and ibuprofen as needed.   Return with any new or concerning symptoms. Follow up in the clinic as needed.

## 2024-04-08 NOTE — ED PROVIDER NOTES
History     Chief Complaint   Patient presents with    Sinusitis     HPI  Jay Jay Chatman is a 48 year old male who presents to the urgent care with a 10 day history of bilateral ear pain, sinus pressure, and headaches. He denies chest pressure, abd pain, and n/v/d. No recent abx or OTC medications.     Allergies:  No Known Allergies    Problem List:    Patient Active Problem List    Diagnosis Date Noted    Anxiety 11/03/2023     Priority: Medium    S/P right hemicolectomy 11/03/2023     Priority: Medium    Benign essential hypertension 03/11/2020     Priority: Medium    NAVIN (obstructive sleep apnea) 03/11/2020     Priority: Medium    Other chest pain 03/11/2020     Priority: Medium    Left axis deviation 03/11/2020     Priority: Medium    Lumbar disc herniation 05/23/2016     Priority: Medium    Low back pain, left L4-L5 radiculopathy 02/24/2016     Priority: Medium    Seasonal allergic rhinitis 08/25/2015     Priority: Medium        Past Medical History:    Past Medical History:   Diagnosis Date    Acute sinusitis, unspecified 10/09/2006    Polyp of ascending colon        Past Surgical History:    Past Surgical History:   Procedure Laterality Date    ADENOIDECTOMY  04/01/2006    COLONOSCOPY N/A 09/14/2023    Procedure: COLONOSCOPY WITH POLYPECTOMY;  Surgeon: Jez Alvarez MD;  Location: [facility] OR    DISKECTOMY, LUMBAR, ADDTNL SP      FESS  02/01/2010    TONSILLECTOMY  04/01/2006    ventilation tubes, bilateral x 2  04/01/2006       Family History:    Family History   Problem Relation Age of Onset    Diabetes Mother     Cerebrovascular Disease Mother     Heart Disease Mother         stents    Diabetes Father     Other - See Comments Brother 4        myocardial infarction    Heart Disease Brother         MI    Other - See Comments Sister         kidney problems; status post nephrectomy       Social History:  Marital Status:   [2]  Social History     Tobacco Use    Smoking status: Some Days     Types:  Cigarettes    Smokeless tobacco: Never    Tobacco comments:     no passive exposure   Vaping Use    Vaping Use: Never used   Substance Use Topics    Alcohol use: No    Drug use: No        Medications:    amoxicillin-clavulanate (AUGMENTIN) 875-125 MG tablet  cetirizine (ZYRTEC) 10 MG tablet  fluticasone (FLONASE) 50 MCG/ACT nasal spray  losartan (COZAAR) 50 MG tablet  sertraline (ZOLOFT) 50 MG tablet  losartan (COZAAR) 25 MG tablet          Review of Systems   Constitutional:  Positive for fever. Negative for activity change and appetite change.   HENT:  Positive for congestion, ear pain, sinus pressure and sinus pain. Negative for sore throat.    Respiratory:  Positive for cough.    Cardiovascular:  Negative for chest pain.   Neurological:  Positive for headaches.   All other systems reviewed and are negative.      Physical Exam   BP: (!) 171/109  Pulse: 86  Temp: 99.3  F (37.4  C)  Resp: 18  Weight: 86.2 kg (190 lb)  SpO2: 96 %      Physical Exam  Vitals and nursing note reviewed.   Constitutional:       General: He is not in acute distress.     Appearance: Normal appearance. He is ill-appearing. He is not toxic-appearing or diaphoretic.   HENT:      Right Ear: Tympanic membrane is erythematous.      Left Ear: Tympanic membrane is erythematous.      Nose: Congestion present.      Right Sinus: Maxillary sinus tenderness and frontal sinus tenderness present.      Left Sinus: Maxillary sinus tenderness and frontal sinus tenderness present.      Mouth/Throat:      Mouth: Mucous membranes are moist.      Pharynx: Oropharynx is clear. No oropharyngeal exudate or posterior oropharyngeal erythema.   Cardiovascular:      Rate and Rhythm: Normal rate and regular rhythm.      Pulses: Normal pulses.      Heart sounds: Normal heart sounds.   Pulmonary:      Effort: Pulmonary effort is normal. No respiratory distress.      Breath sounds: Normal breath sounds. No stridor. No wheezing, rhonchi or rales.   Neurological:       Mental Status: He is alert.         ED Course        Procedures                No results found for this or any previous visit (from the past 24 hour(s)).    Medications - No data to display    Assessments & Plan (with Medical Decision Making)     I have reviewed the nursing notes.    I have reviewed the findings, diagnosis, plan and need for follow up with the patient.  Jay Jay Chatman is a 48 year old male who presents to the urgent care with a 10 day history of bilateral ear pain, sinus pressure, and headaches. He denies chest pressure, abd pain, and n/v/d. No recent abx or OTC medications.     MDM: vital signs normal with exception of elevated BP, has not taken losartan today. Denies chest pressure. Non toxic in appearance with no noted distress. Lungs clear, heart tones regular. Given his length of symptoms of sinus pressure along with bilateral erythema to TMs, augmentin and flonase prescribed. Supportive measures and return precautions discussed. He is in agreement with plan.     (H66.93) Bilateral acute otitis media, (J01.90) Acute sinusitis with symptoms > 10 days  Plan: Antibiotics 2 times daily for 7 days. Probiotics while taking to support gut lorenzo.   Flonase once daily for 5 days.   Push fluids.   Tylenol and ibuprofen as needed.   Return with any new or concerning symptoms. Follow up in the clinic as needed. Understanding verbalized.     New Prescriptions    AMOXICILLIN-CLAVULANATE (AUGMENTIN) 875-125 MG TABLET    Take 1 tablet by mouth 2 times daily for 7 days    FLUTICASONE (FLONASE) 50 MCG/ACT NASAL SPRAY    Spray 1 spray into both nostrils daily for 5 days       Final diagnoses:   Bilateral acute otitis media   Acute sinusitis with symptoms > 10 days       4/8/2024   [facility]       Azul Nicole NP  04/08/24 1728

## 2024-04-15 DIAGNOSIS — R06.83 SNORING: Primary | ICD-10-CM

## 2024-04-16 ENCOUNTER — VIRTUAL VISIT (OUTPATIENT)
Dept: SLEEP MEDICINE | Facility: HOSPITAL | Age: 49
End: 2024-04-16
Attending: FAMILY MEDICINE
Payer: COMMERCIAL

## 2024-04-16 DIAGNOSIS — G47.33 OSA (OBSTRUCTIVE SLEEP APNEA): Primary | ICD-10-CM

## 2024-04-16 NOTE — PROGRESS NOTES
Patient was provided both verbal and written education and instructions on use of Watch PAT device. Watch PAT device has been registered and shipped via [employer] on 4/16/2024. Patient was notified that package was mailed out. Watch PAT serial number: [id number]    Picked up at [facility].

## 2024-04-23 ENCOUNTER — DOCUMENTATION ONLY (OUTPATIENT)
Dept: SLEEP MEDICINE | Facility: HOSPITAL | Age: 49
End: 2024-04-23
Attending: FAMILY MEDICINE
Payer: COMMERCIAL

## 2024-04-23 DIAGNOSIS — R06.83 SNORING: ICD-10-CM

## 2024-04-23 PROCEDURE — 95800 SLP STDY UNATTENDED: CPT | Mod: 26

## 2024-04-23 PROCEDURE — 95800 SLP STDY UNATTENDED: CPT

## 2024-04-23 NOTE — PROCEDURES
"WatchPAT - HOME SLEEP STUDY INTERPRETATION    Patient: Jay Jay Chatman  MRN: [id number]  YOB: 1975  Study Date: 4/20/2024  Referring Provider: Liliana Braga  Ordering Provider: Maxwell Corrigan MD, MD    Chain of custody patient verification was not enabled.       Indications for Home Study: Jay Jay Chatman is a 48 year old male with a history of hypertension, obstructive sleep apnea, anxiety  who presents with symptoms suggestive of obstructive sleep apnea.    Estimated body mass index is 30.67 kg/m  as calculated from the following:    Height as of 10/31/23: 1.676 m (5' 6\").    Weight as of 4/8/24: 86.2 kg (190 lb).  Total score - [place]: 8 (2/24/2024  3:22 PM)  Total Score: 5 (2/24/2024  3:21 PM)    Data: A full night home sleep study was performed recording the standard physiologic parameters including peripheral arterial tonometry (PAT), sound/snoring, body position,  movement, sound, and oxygen saturation by pulse oximetry. Pulse rate was estimated by oximetry recording. Sleep staging (wake, REM, light, and deep sleep) was derived from PAT signal.  This study was considered adequate based on > 4 hours of quality oximetry and respiratory recording. As specified by the AASM Manual for the Scoring of Sleep and Associated events, version 2.3, Rule VIII.D 1B, 4% oxygen desaturation scoring for hypopneas is used as a standard of care on all home sleep apnea testing.    Total Recording Time: 9 hrs, 43 min  Total Sleep Time: 9 hrs, 1 min  % of Sleep Time REM: 18%    Respiratory:  Snoring: Snoring was present.  Respiratory events: The PAT respiratory disturbance index [pRDI] was 8.8 events per hour.  The PAT apnea/hypopnea index [pAHI] was 6.7 events per hour.  LIBERTAD was 6 events per hour.  During REM sleep the pAHI was 8.7.  Sleep Associated Hypoxemia: sustained hypoxemia was not present. Mean oxygen saturation was 93%.  Minimum was 88%.  Time with saturation less than 88% was 0 minutes.    Heart Rate: " By pulse oximetry normal rate was noted.     Position: Percent of time spent: supine - 64.5%, prone - 24%, on right - 11.5%, on left - 0%.  pAHI was 9.5 per hour supine, 1.4 per hour prone, 2.1 per hour on right side, and - per hour on left side.     Assessment:   Mild obstructive sleep apnea.  Sleep associated hypoxemia was not present.  Strong positional component with supine pAHI 9.5 with ~65% of sleep time, non-supine pAHI 1.6.    Recommendations:  Consider auto-CPAP at 5-15 cmH2O, oral appliance therapy, positional therapy, or polysomnography with full night PAP titration.  Suggest optimizing sleep hygiene and avoiding sleep deprivation.  Weight management.    Diagnosis Code(s): Obstructive Sleep Apnea G47.33    Maxwell Corrigan MD, MD, April 23, 2024   Diplomate, American Board of Family Medicine, Sleep Medicine

## 2024-04-23 NOTE — PROGRESS NOTES
WatchPAT data has been received and has been scored using rule 1B, 4%. Patient to follow up with provider to determine appropriate therapy.    Pat AHI: 6.7    Ordering Provider: Dr Maxwell Corrigan      Sleep Technician/Technologist: Shirley THOMPSON

## 2024-05-06 NOTE — PROGRESS NOTES
"Jay Jay Chatman is a 48 year old male who is being evaluated via a billable video visit.       The patient has been notified of following:      \"This video visit will be conducted via a call between you and your physician/provider. We have found that certain health care needs can be provided without the need for an in-person physical exam.  This service lets us provide the care you need with a video conversation.  If a prescription is necessary we can send it directly to your pharmacy.  If lab work is needed we can place an order for that and you can then stop by our lab to have the test done at a later time.     Video visits are billed at different rates depending on your insurance coverage.  Please reach out to your insurance provider with any questions.     If during the course of the call the physician/provider feels a video visit is not appropriate, you will not be charged for this service.\"     Patient has given verbal consent for Video visit? Yes  How would you like to obtain your AVS? Mail a copy  If you are dropped from the video visit, the video invite should be resent to: Text to cell phone: -  Will anyone else be joining your video visit? No  If patient encounters technical issues they should call [phone]      Video-Visit Details     Type of service:  Video Visit     Start Time:  1100  End Time:  1120    Originating Location (pt. Location): Home     Distant Location (provider location):  Off-site, [facility]       Platform used for Video Visit: [employer]    Virtual visit for review of sleep testing results.     A/P:     1.)  Mild NAVIN (AHI 6.7) without sleep-associated hypoxemia.    Co-morbid HTN, anxiety.    Goals for treatment of preventing snoring / observed apnea, decrease awakenings, reduce fatigue.    Discussed treatment options of CPAP, oral appliance, weight management.    Plan to proceed with CPAP auto-titrate 5-15 cm H2O.    Plan for follow-up in 4-6 weeks after " "starting CPAP.    SUBJECTIVE:  Jay Jay Chatman is a 48 year old male.    48 year old male who is referred for sleep disordered breathing.     Pertinent PMHx of hypertension, obstructive sleep apnea, anxiety.     STOP-BANG score of 4, with unknown neck circumference.  Billings score of 8.  GALEN: 14     BMI of Estimated body mass index is 32.14 kg/m  as calculated from the following:    Height as of 10/31/23: 1.676 m (5' 6\").    Weight as of 12/29/23: 90.3 kg (199 lb 2 oz).      Today - Concerns of night time awakenings, but more recently of HTN and newly observed apnea per his spouse.    His sleep pattern has changed over the past few days since his job recently ended.    Chief concern per questionnaire: \"Recommended by my Dr. \"     Duration of symptoms:  \"It has been several years \"     Goals for visit per questionnaire: \"To find out if there is anything that can be done to help me sleep better. \"     Sleep pattern:  Workdays.  9pm - 5am.  Weekends.  9-11pm to 7-8am.  Time to fall asleep: ~30-60 minutes.  Awakenings: 2-3 times per night, 60 minutes to return to sleep.  Average total sleep time:  4-5 hours  Napping.  1-2 days per week, ? hours per nap.     No for RLS screen.  No for sleep walking.  No for dream enactment behavior.  No for bruxism.     No for morning headaches.  Yes for snoring.  No for observed apnea.  No for FHx of NAVIN.     Caffeine use:  No for 3+ per day.  Yes for within 6 hours of bed.    WatchPAT - HOME SLEEP STUDY INTERPRETATION     Patient: Jay Jay Chatman  MRN: [id number]  YOB: 1975  Study Date: 4/20/2024  Referring Provider: Liliana Braga  Ordering Provider: Maxwell Corrigan MD, MD     Chain of custody patient verification was not enabled.       Indications for Home Study: Jay Jay Chatman is a 48 year old male with a history of hypertension, obstructive sleep apnea, anxiety  who presents with symptoms suggestive of obstructive sleep apnea.     Estimated body mass index is 30.67 " "kg/m  as calculated from the following:    Height as of 10/31/23: 1.676 m (5' 6\").    Weight as of 4/8/24: 86.2 kg (190 lb).  Total score - [place]: 8 (2/24/2024  3:22 PM)  Total Score: 5 (2/24/2024  3:21 PM)     Data: A full night home sleep study was performed recording the standard physiologic parameters including peripheral arterial tonometry (PAT), sound/snoring, body position,  movement, sound, and oxygen saturation by pulse oximetry. Pulse rate was estimated by oximetry recording. Sleep staging (wake, REM, light, and deep sleep) was derived from PAT signal.  This study was considered adequate based on > 4 hours of quality oximetry and respiratory recording. As specified by the AASM Manual for the Scoring of Sleep and Associated events, version 2.3, Rule VIII.D 1B, 4% oxygen desaturation scoring for hypopneas is used as a standard of care on all home sleep apnea testing.     Total Recording Time: 9 hrs, 43 min  Total Sleep Time: 9 hrs, 1 min  % of Sleep Time REM: 18%     Respiratory:  Snoring: Snoring was present.  Respiratory events: The PAT respiratory disturbance index [pRDI] was 8.8 events per hour.  The PAT apnea/hypopnea index [pAHI] was 6.7 events per hour.  LIBERTAD was 6 events per hour.  During REM sleep the pAHI was 8.7.  Sleep Associated Hypoxemia: sustained hypoxemia was not present. Mean oxygen saturation was 93%.  Minimum was 88%.  Time with saturation less than 88% was 0 minutes.     Heart Rate: By pulse oximetry normal rate was noted.      Position: Percent of time spent: supine - 64.5%, prone - 24%, on right - 11.5%, on left - 0%.  pAHI was 9.5 per hour supine, 1.4 per hour prone, 2.1 per hour on right side, and - per hour on left side.      Assessment:   Mild obstructive sleep apnea.  Sleep associated hypoxemia was not present.  Strong positional component with supine pAHI 9.5 with ~65% of sleep time, non-supine pAHI 1.6.     Recommendations:  Consider auto-CPAP at 5-15 cmH2O, oral appliance " therapy, positional therapy, or polysomnography with full night PAP titration.  Suggest optimizing sleep hygiene and avoiding sleep deprivation.  Weight management.     Diagnosis Code(s): Obstructive Sleep Apnea G47.33     Maxwell Corrigan MD, MD, April 23, 2024   Diplomate, American Board of Family Medicine, Sleep Medicine    Past medical history:    Patient Active Problem List    Diagnosis Date Noted    Anxiety 11/03/2023     Priority: Medium    S/P right hemicolectomy 11/03/2023     Priority: Medium    Benign essential hypertension 03/11/2020     Priority: Medium    NAVNI (obstructive sleep apnea) 03/11/2020     Priority: Medium    Other chest pain 03/11/2020     Priority: Medium    Left axis deviation 03/11/2020     Priority: Medium    Lumbar disc herniation 05/23/2016     Priority: Medium    Low back pain, left L4-L5 radiculopathy 02/24/2016     Priority: Medium    Seasonal allergic rhinitis 08/25/2015     Priority: Medium       10 point ROS of systems including Constitutional, Eyes, Respiratory, Cardiovascular, Gastroenterology, Genitourinary, Integumentary, Muscularskeletal, Psychiatric were all negative except for pertinent positives noted in my HPI.    Current Outpatient Medications   Medication Sig Dispense Refill    cetirizine (ZYRTEC) 10 MG tablet Take 10 mg by mouth daily as needed for allergies      losartan (COZAAR) 25 MG tablet Take 25 mg by mouth every evening      losartan (COZAAR) 50 MG tablet TAKE 1 TABLET BY MOUTH DAILY 30 tablet 5    sertraline (ZOLOFT) 50 MG tablet TAKE 1 TABLET BY MOUTH AT BEDTIME 30 tablet 5       OBJECTIVE:  There were no vitals taken for this visit.    Physical Exam     ---  This note was written with the assistance of the Dragon voice-dictation technology software. The final document, although reviewed, may contain errors. For corrections, please contact the office.    Total time spent preparing to see the patient, review of chart, obtaining history and physical  examination, review of sleep testing, review of treatment options, education, discussion with patient and documenting in Epic / EMR was 25 minutes.  All time involved was spent on the day of service for the patient (the same day as the patient's appointment).    Maxwell Corrigan MD    Sleep Medicine  [place], [place]  Main Office: [phone]  [facility] [address], [address]  Schedule visits: [phone]  Main Office: [phone]  Fax: [phone]

## 2024-05-07 ENCOUNTER — VIRTUAL VISIT (OUTPATIENT)
Dept: PULMONOLOGY | Facility: OTHER | Age: 49
End: 2024-05-07
Attending: FAMILY MEDICINE
Payer: COMMERCIAL

## 2024-05-07 VITALS — WEIGHT: 190 LBS | BODY MASS INDEX: 31.65 KG/M2 | HEIGHT: 65 IN

## 2024-05-07 DIAGNOSIS — I10 BENIGN ESSENTIAL HYPERTENSION: Chronic | ICD-10-CM

## 2024-05-07 DIAGNOSIS — G47.33 OSA (OBSTRUCTIVE SLEEP APNEA): Primary | ICD-10-CM

## 2024-05-07 PROCEDURE — 99213 OFFICE O/P EST LOW 20 MIN: CPT | Mod: 95 | Performed by: FAMILY MEDICINE

## 2024-05-07 ASSESSMENT — PAIN SCALES - GENERAL: PAINLEVEL: NO PAIN (0)

## 2024-05-07 NOTE — NURSING NOTE
Is the patient currently in the state of [place]? YES    Visit mode:VIDEO    If the visit is dropped, the patient can be reconnected by: VIDEO VISIT: Text to cell phone:   Telephone Information:   Mobile [phone]       Will anyone else be joining the visit? NO  (If patient encounters technical issues they should call [phone]    How would you like to obtain your AVS? MyChart    Are changes needed to the allergy or medication list? No    Are refills needed on medications prescribed by this physician? NO    Reason for visit: RECHECK    Michael AGUIRRE

## 2024-05-07 NOTE — PROGRESS NOTES
"Virtual Visit Details    Type of service:  Video Visit     Originating Location (pt. Location): {video visit patient location:[id number]::\"Home\"}  {PROVIDER LOCATION On-site should be selected for visits conducted from your clinic location or adjoining [facility] hospital, academic office, or other nearby [facility] building. Off-site should be selected for all other provider locations, including home:[id number]}  Distant Location (provider location):  {virtual location provider:850137}  Platform used for Video Visit: {Virtual Visit Platforms:556822::\"[employer]\"}    "

## 2024-06-26 ENCOUNTER — PATIENT OUTREACH (OUTPATIENT)
Dept: GASTROENTEROLOGY | Facility: CLINIC | Age: 49
End: 2024-06-26

## 2024-06-26 DIAGNOSIS — Z12.11 SPECIAL SCREENING FOR MALIGNANT NEOPLASMS, COLON: Primary | ICD-10-CM

## 2024-06-26 NOTE — PROGRESS NOTES
"Patient has a history of adenomatous polyp requiring hemicolectomy and surveillance colonoscopy is due Oct 2024. Patient meets criteria for CRC high risk standing order protocol.    CRC Screening Colonoscopy Referral Review    Patient meets the inclusion criteria for screening colonoscopy standing order.    Ordering/Referring Provider:  Liliana Braga MD    BMI: Estimated body mass index is 31.62 kg/m  as calculated from the following:    Height as of 5/7/24: 1.651 m (5' 5\").    Weight as of 5/7/24: 86.2 kg (190 lb).     Sedation:  Does patient have any of the following conditions affecting sedation?  Hx of severe PTSD, anxiety, or psychosis: MAC sedation recommended    Previous Scopes:  Any previous recommendations or follow up needs based on previous scope?  na / No recommendations.    Medical Concerns to Postpone Order:  Does patient have any of the following medical concerns that should postpone/delay colonoscopy referral?  No medical conditions affecting colonoscopy referral.    Final Referral Details:  Based on patient's medical history patient is appropriate for referral order with MAC/deep sedation.   BMI<= 45 45 < BMI <= 48 48 < BMI < = 50  BMI > 50   No Restrictions No [facility]  [facility]  [facility] with exceptions Hospital Only OR Only     "

## 2024-08-12 ENCOUNTER — DOCUMENTATION ONLY (OUTPATIENT)
Dept: HOME HEALTH SERVICES | Facility: CLINIC | Age: 49
End: 2024-08-12

## 2024-08-12 NOTE — PROGRESS NOTES
Patient was offered choice of vendor and chose [facility].  Patient Jay Jay Chatman was set up at Outside Vendor [place], [place] on August 12, 2024. Patient received a Resmed Airsense 11 Pressures were set at  5-15 cm H2O.   Patient s ramp is Off and FLEX/EPR is EPR, 2.  Patient received a Resmed Mask name: AirFit N30i  Nasal mask size Standard, heated tubing and heated humidifier.  Patient has the following compliance requirements: using and visit requirements  Patient has a follow up on 10/17/24 with Dr. Corrigan.    Anderson Abreu

## 2024-09-13 ENCOUNTER — OFFICE VISIT (OUTPATIENT)
Dept: FAMILY MEDICINE | Facility: OTHER | Age: 49
End: 2024-09-13
Attending: STUDENT IN AN ORGANIZED HEALTH CARE EDUCATION/TRAINING PROGRAM
Payer: COMMERCIAL

## 2024-09-13 VITALS
HEART RATE: 62 BPM | DIASTOLIC BLOOD PRESSURE: 84 MMHG | WEIGHT: 208.5 LBS | RESPIRATION RATE: 18 BRPM | TEMPERATURE: 97.9 F | BODY MASS INDEX: 34.7 KG/M2 | OXYGEN SATURATION: 99 % | SYSTOLIC BLOOD PRESSURE: 138 MMHG

## 2024-09-13 DIAGNOSIS — F41.9 ANXIETY: ICD-10-CM

## 2024-09-13 DIAGNOSIS — G47.33 OSA ON CPAP: Primary | ICD-10-CM

## 2024-09-13 DIAGNOSIS — I10 BENIGN ESSENTIAL HYPERTENSION: ICD-10-CM

## 2024-09-13 PROCEDURE — 99214 OFFICE O/P EST MOD 30 MIN: CPT | Performed by: STUDENT IN AN ORGANIZED HEALTH CARE EDUCATION/TRAINING PROGRAM

## 2024-09-13 RX ORDER — LOSARTAN POTASSIUM 50 MG/1
50 TABLET ORAL DAILY
Qty: 30 TABLET | Refills: 5 | Status: SHIPPED | OUTPATIENT
Start: 2024-09-13

## 2024-09-13 ASSESSMENT — PAIN SCALES - GENERAL: PAINLEVEL: NO PAIN (0)

## 2024-09-13 NOTE — PROGRESS NOTES
"  Assessment & Plan     Benign essential hypertension  BP in good range today  Continue losartan 50 mg daily  Recommend DASH diet and continued home BP monitoring  - losartan (COZAAR) 50 MG tablet; Take 1 tablet (50 mg) by mouth daily.    Anxiety  Stable.    On 50 mg zoloft and tolerating well  Continue present management  No SI/HI  - sertraline (ZOLOFT) 50 MG tablet; Take 1 tablet (50 mg) by mouth at bedtime.      BMI  Estimated body mass index is 34.7 kg/m  as calculated from the following:    Height as of 5/7/24: 1.651 m (5' 5\").    Weight as of this encounter: 94.6 kg (208 lb 8 oz).   Weight management plan: Discussed healthy diet and exercise guidelines        No follow-ups on file.    Isaias Goyal is a 49 year old, presenting for the following health issues:  Hypertension        9/13/2024     9:18 AM   Additional Questions   Roomed by Alysa SILVA   Accompanied by Wife     History of Present Illness       Hypertension: He presents for follow up of hypertension.  He does not check blood pressure  regularly outside of the clinic. Outpatient blood pressures have not been over 140/90. He does not follow a low salt diet.     He eats 2-3 servings of fruits and vegetables daily.He consumes 2 sweetened beverage(s) daily.He exercises with enough effort to increase his heart rate 20 to 29 minutes per day.  He exercises with enough effort to increase his heart rate 5 days per week. He is missing 1 dose(s) of medications per week.       Hypertension Follow-up    Do you check your blood pressure regularly outside of the clinic? No   Are you following a low salt diet? No  Are your blood pressures ever more than 140 on the top number (systolic) OR more   than 90 on the bottom number (diastolic), for example 140/90? No not recently           Review of Systems  Constitutional, HEENT, cardiovascular, pulmonary, GI, , musculoskeletal, neuro, skin, endocrine and psych systems are negative, except as otherwise noted.    "   Objective    /84 (BP Location: Right arm, Patient Position: Sitting, Cuff Size: Adult Large)   Pulse 62   Temp 97.9  F (36.6  C) (Tympanic)   Resp 18   Wt 94.6 kg (208 lb 8 oz)   SpO2 99%   BMI 34.70 kg/m    Body mass index is 34.7 kg/m .  Physical Exam   GENERAL: alert and no distress  EYES: Eyes grossly normal to inspection, PERRL and conjunctivae and sclerae normal  HENT: ear canals and TM's normal, nose and mouth without ulcers or lesions  NECK: no adenopathy, no asymmetry, masses, or scars  RESP: lungs clear to auscultation - no rales, rhonchi or wheezes  CV: regular rate and rhythm, normal S1 S2, no S3 or S4, no murmur, click or rub, no peripheral edema  ABDOMEN: soft, nontender, no hepatosplenomegaly, no masses and bowel sounds normal  MS: no gross musculoskeletal defects noted, no edema  SKIN: no suspicious lesions or rashes  NEURO: Normal strength and tone, mentation intact and speech normal  PSYCH: mentation appears normal, affect normal/bright          Signed Electronically by: Liliana Braga MD

## 2024-11-05 ENCOUNTER — TELEPHONE (OUTPATIENT)
Dept: FAMILY MEDICINE | Facility: OTHER | Age: 49
End: 2024-11-05

## 2024-11-05 NOTE — TELEPHONE ENCOUNTER
Screening Questions for the Scheduling of Screening Colonoscopies     (If Colonoscopy is diagnostic, Provider should review the chart before scheduling.)    Are you younger than 50 or older than 80?  No    Do you take aspirin or fish oil?  No (if yes, tell patient to stop 1 week prior to Colonoscopy)    Do you take warfarin (Coumadin), clopidogrel (Plavix), apixaban (Eliquis), dabigatram (Pradaxa), rivaroxaban (Xarelto) or any blood thinner? No    Do you use oxygen at home?  No    Do you have kidney disease? No    Are you on dialysis? No    Have you had a stroke or heart attack in the last year? No    Have you had a stent in your heart or any blood vessel in the last year? No    Have you had a transplant of any organ?  No    Have you had a colonoscopy or upper endoscopy (EGD) before?  YES. Date-         Date of scheduled Colonoscopy: 2/20/25    Provider: Dr. JACKSON Alvarez     [facility]

## 2024-12-02 NOTE — PROGRESS NOTES
"    Virtual Visit Details    Type of service:  Video Visit     Originating Location (pt. Location): Home    Distant Location (provider location):  Off-site  Platform used for Video Visit: [employer]    Jay Jay Chatman is a 49 year old male who is being evaluated via a billable video visit.       The patient has been notified of following:      \"This video visit will be conducted via a call between you and your physician/provider. We have found that certain health care needs can be provided without the need for an in-person physical exam.  This service lets us provide the care you need with a video conversation.  If a prescription is necessary we can send it directly to your pharmacy.  If lab work is needed we can place an order for that and you can then stop by our lab to have the test done at a later time.     Video visits are billed at different rates depending on your insurance coverage.  Please reach out to your insurance provider with any questions.     If during the course of the call the physician/provider feels a video visit is not appropriate, you will not be charged for this service.\"     Patient has given verbal consent for Video visit? Yes  How would you like to obtain your AVS? Mail a copy  If you are dropped from the video visit, the video invite should be resent to: Text to cell phone: -  Will anyone else be joining your video visit? No  If patient encounters technical issues they should call [phone]      Video-Visit Details     Type of service:  Video Visit     Start Time:  3:30pm  End Time:  3:40pm    Originating Location (pt. Location): Home     Distant Location (provider location):  Off-site, [facility] [place]       Platform used for Video Visit: [employer]    Virtual visit for CPAP compliance follow-up.     A/P:     1.)  Mild NAVIN (AHI 6.7) without sleep-associated hypoxemia.     Co-morbid HTN, anxiety.     Goals for treatment of preventing snoring / observed apnea, decrease " "awakenings, reduce fatigue.     Discussed treatment options of CPAP, oral appliance, weight management.     Appears to be well controlled with adequate compliance with CPAP auto-titrate 5-15 cm H2O.     He does appear to report benefit of improved sleep quality, as well as improvements and hypertension.    Plan to continue CPAP on current settings with routine follow-up in 1 year or sooner if questions or concerns.    The longitudinal plan of care for the diagnosis(es)/condition(s) as documented were addressed during this visit. Due to the added complexity in care, I will continue to support Jay Jay in the subsequent management and with ongoing continuity of care.      SUBJECTIVE:  Jay Jay Chatman is a 49 year old male.    Pertinent PMHx of hypertension, obstructive sleep apnea, anxiety.     Prior Sleep Testin2024 - WatchPAT with weight 190 lbs, BMI 30.7.  pAHI 6.7.  sustained hypoxemia was not present. Mean oxygen saturation was 93%. Minimum was 88%. Time with saturation less than 88% was 0 minutes     2024 - Concerns of night time awakenings, but more recently of HTN and newly observed apnea per his spouse.     His sleep pattern has changed over the past few days since his job recently ended.     Chief concern per questionnaire: \"Recommended by my Dr. \"     Duration of symptoms:  \"It has been several years \"     Goals for visit per questionnaire: \"To find out if there is anything that can be done to help me sleep better. \"     Sleep pattern:  Workdays.  9pm - 5am.  Weekends.  9-11pm to 7-8am.  Time to fall asleep: ~30-60 minutes.  Awakenings: 2-3 times per night, 60 minutes to return to sleep.  Average total sleep time:  4-5 hours  Napping.  1-2 days per week, ? hours per nap.     No for RLS screen.  No for sleep walking.  No for dream enactment behavior.  No for bruxism.     No for morning headaches.  Yes for snoring.  No for observed apnea.  No for FHx of NAVIN.     Caffeine use:  No for 3+ per day.  Yes " for within 6 hours of bed.    A/P to start CPAP auto 5-15.    Today -overall, he feels that the CPAP is going well.  He has no specific concerns with it, pressure feels comfortable.  He does seem to report some sleep benefit, as well as improvements in his blood pressure control.    CPAP download reviewed over past 30 days on auto-titrate 5-15 cm H2O.  Average daily usage 262 minutes.  AHI 0.5.    Past medical history:    Patient Active Problem List    Diagnosis Date Noted    Anxiety 11/03/2023     Priority: Medium    S/P right hemicolectomy 11/03/2023     Priority: Medium    Benign essential hypertension 03/11/2020     Priority: Medium    NAVIN (obstructive sleep apnea) 03/11/2020     Priority: Medium    Other chest pain 03/11/2020     Priority: Medium    Left axis deviation 03/11/2020     Priority: Medium    Lumbar disc herniation 05/23/2016     Priority: Medium    Low back pain, left L4-L5 radiculopathy 02/24/2016     Priority: Medium    Seasonal allergic rhinitis 08/25/2015     Priority: Medium       10 point ROS of systems including Constitutional, Eyes, Respiratory, Cardiovascular, Gastroenterology, Genitourinary, Integumentary, Muscularskeletal, Psychiatric were all negative except for pertinent positives noted in my HPI.    Current Outpatient Medications   Medication Sig Dispense Refill    cetirizine (ZYRTEC) 10 MG tablet Take 10 mg by mouth daily as needed for allergies      losartan (COZAAR) 50 MG tablet Take 1 tablet (50 mg) by mouth daily. 30 tablet 5    sertraline (ZOLOFT) 50 MG tablet Take 1 tablet (50 mg) by mouth at bedtime. 30 tablet 5       OBJECTIVE:  There were no vitals taken for this visit.    Physical Exam     ---  This note was written with the assistance of the Dragon voice-dictation technology software. The final document, although reviewed, may contain errors. For corrections, please contact the office.    Total time spent preparing to see the patient, review of chart, obtaining history and  physical examination, review of sleep testing, review of treatment options, education, discussion with patient and documenting in Epic / EMR was 15 minutes.  All time involved was spent on the day of service for the patient (the same day as the patient's appointment).    Maxwell Corrigan MD    Sleep Medicine  [place], [place]  Main Office: [phone]  [facility] [address], [address]  Schedule visits: [phone]  Main Office: [phone]  Fax: [phone]

## 2024-12-05 ENCOUNTER — VIRTUAL VISIT (OUTPATIENT)
Dept: PULMONOLOGY | Facility: OTHER | Age: 49
End: 2024-12-05
Attending: FAMILY MEDICINE
Payer: COMMERCIAL

## 2024-12-05 VITALS — WEIGHT: 190 LBS | BODY MASS INDEX: 30.53 KG/M2 | HEIGHT: 66 IN

## 2024-12-05 DIAGNOSIS — G47.33 OSA (OBSTRUCTIVE SLEEP APNEA): ICD-10-CM

## 2024-12-05 DIAGNOSIS — I10 BENIGN ESSENTIAL HYPERTENSION: Primary | ICD-10-CM

## 2024-12-05 ASSESSMENT — PAIN SCALES - GENERAL: PAINLEVEL_OUTOF10: NO PAIN (0)

## 2024-12-05 NOTE — NURSING NOTE
Current patient location: [address] [place] [id number]    Is the patient currently in the state of [place]? YES    Visit mode:VIDEO    If the visit is dropped, the patient can be reconnected by:VIDEO VISIT: Text to cell phone:   Telephone Information:   Mobile [phone]       Will anyone else be joining the visit? NO  (If patient encounters technical issues they should call [phone]    Are changes needed to the allergy or medication list? No    Are refills needed on medications prescribed by this physician? NO    Rooming Documentation:  Questionnaire(s) completed    Reason for visit: RECHECK    Davina FRIEDF

## 2024-12-05 NOTE — PROGRESS NOTES
CPAP Compliance Visit:       Name: Jay Jay Chatman MRN# [id number]   Age: 49 year old YOB: 1975     Date of Consultation: December 5, 2024  Primary care provider: Liliana Braga    Compliance:   73 % of days with >4 hours of use.   1days/30 with no use   Average use: 4hours 33minutes per day   95%ile leak 11 L/min   CPAP 95% pressure 9.6 cm   AHI 0.5 events/hour   MIRNA 0  PB 0%     Impression:   Patient is {CPAP Compliance :981605}

## 2024-12-17 ENCOUNTER — OFFICE VISIT (OUTPATIENT)
Dept: FAMILY MEDICINE | Facility: OTHER | Age: 49
End: 2024-12-17
Attending: STUDENT IN AN ORGANIZED HEALTH CARE EDUCATION/TRAINING PROGRAM
Payer: COMMERCIAL

## 2024-12-17 VITALS
DIASTOLIC BLOOD PRESSURE: 92 MMHG | BODY MASS INDEX: 32.63 KG/M2 | HEART RATE: 72 BPM | SYSTOLIC BLOOD PRESSURE: 136 MMHG | RESPIRATION RATE: 16 BRPM | TEMPERATURE: 97.6 F | OXYGEN SATURATION: 98 % | WEIGHT: 207.9 LBS | HEIGHT: 67 IN

## 2024-12-17 DIAGNOSIS — I10 BENIGN ESSENTIAL HYPERTENSION: ICD-10-CM

## 2024-12-17 DIAGNOSIS — G47.33 OSA ON CPAP: ICD-10-CM

## 2024-12-17 DIAGNOSIS — E78.5 HYPERLIPIDEMIA LDL GOAL <100: ICD-10-CM

## 2024-12-17 DIAGNOSIS — E66.09 CLASS 1 OBESITY DUE TO EXCESS CALORIES WITH SERIOUS COMORBIDITY AND BODY MASS INDEX (BMI) OF 32.0 TO 32.9 IN ADULT: ICD-10-CM

## 2024-12-17 DIAGNOSIS — Z00.00 ROUTINE HISTORY AND PHYSICAL EXAMINATION OF ADULT: Primary | ICD-10-CM

## 2024-12-17 DIAGNOSIS — F17.200 SMOKER: ICD-10-CM

## 2024-12-17 DIAGNOSIS — Z90.49 S/P RIGHT HEMICOLECTOMY: ICD-10-CM

## 2024-12-17 DIAGNOSIS — F41.9 ANXIETY: ICD-10-CM

## 2024-12-17 DIAGNOSIS — E66.811 CLASS 1 OBESITY DUE TO EXCESS CALORIES WITH SERIOUS COMORBIDITY AND BODY MASS INDEX (BMI) OF 32.0 TO 32.9 IN ADULT: ICD-10-CM

## 2024-12-17 LAB
ALBUMIN SERPL BCG-MCNC: 4.3 G/DL (ref 3.5–5.2)
ALP SERPL-CCNC: 110 U/L (ref 40–150)
ALT SERPL W P-5'-P-CCNC: 28 U/L (ref 0–70)
ANION GAP SERPL CALCULATED.3IONS-SCNC: 9 MMOL/L (ref 7–15)
AST SERPL W P-5'-P-CCNC: 24 U/L (ref 0–45)
BASOPHILS # BLD AUTO: 0 10E3/UL (ref 0–0.2)
BASOPHILS NFR BLD AUTO: 0 %
BILIRUB SERPL-MCNC: 0.6 MG/DL
BUN SERPL-MCNC: 12.7 MG/DL (ref 6–20)
CALCIUM SERPL-MCNC: 9.2 MG/DL (ref 8.8–10.4)
CHLORIDE SERPL-SCNC: 102 MMOL/L (ref 98–107)
CHOLEST SERPL-MCNC: 192 MG/DL
CREAT SERPL-MCNC: 0.97 MG/DL (ref 0.67–1.17)
CREAT UR-MCNC: 217.6 MG/DL
EGFRCR SERPLBLD CKD-EPI 2021: >90 ML/MIN/1.73M2
EOSINOPHIL # BLD AUTO: 0.2 10E3/UL (ref 0–0.7)
EOSINOPHIL NFR BLD AUTO: 2 %
ERYTHROCYTE [DISTWIDTH] IN BLOOD BY AUTOMATED COUNT: 13.2 % (ref 10–15)
EST. AVERAGE GLUCOSE BLD GHB EST-MCNC: 117 MG/DL
FASTING STATUS PATIENT QL REPORTED: YES
FASTING STATUS PATIENT QL REPORTED: YES
GLUCOSE SERPL-MCNC: 109 MG/DL (ref 70–99)
HBA1C MFR BLD: 5.7 %
HCO3 SERPL-SCNC: 29 MMOL/L (ref 22–29)
HCT VFR BLD AUTO: 47.5 % (ref 40–53)
HDLC SERPL-MCNC: 46 MG/DL
HGB BLD-MCNC: 15.8 G/DL (ref 13.3–17.7)
IMM GRANULOCYTES # BLD: 0 10E3/UL
IMM GRANULOCYTES NFR BLD: 0 %
LDLC SERPL CALC-MCNC: 121 MG/DL
LYMPHOCYTES # BLD AUTO: 1.9 10E3/UL (ref 0.8–5.3)
LYMPHOCYTES NFR BLD AUTO: 27 %
MCH RBC QN AUTO: 28.4 PG (ref 26.5–33)
MCHC RBC AUTO-ENTMCNC: 33.3 G/DL (ref 31.5–36.5)
MCV RBC AUTO: 85 FL (ref 78–100)
MICROALBUMIN UR-MCNC: 44.3 MG/L
MICROALBUMIN/CREAT UR: 20.36 MG/G CR (ref 0–17)
MONOCYTES # BLD AUTO: 0.6 10E3/UL (ref 0–1.3)
MONOCYTES NFR BLD AUTO: 8 %
NEUTROPHILS # BLD AUTO: 4.4 10E3/UL (ref 1.6–8.3)
NEUTROPHILS NFR BLD AUTO: 62 %
NONHDLC SERPL-MCNC: 146 MG/DL
NRBC # BLD AUTO: 0 10E3/UL
NRBC BLD AUTO-RTO: 0 /100
PLATELET # BLD AUTO: 215 10E3/UL (ref 150–450)
POTASSIUM SERPL-SCNC: 3.9 MMOL/L (ref 3.4–5.3)
PROT SERPL-MCNC: 7.1 G/DL (ref 6.4–8.3)
RBC # BLD AUTO: 5.57 10E6/UL (ref 4.4–5.9)
SODIUM SERPL-SCNC: 140 MMOL/L (ref 135–145)
TRIGL SERPL-MCNC: 123 MG/DL
WBC # BLD AUTO: 7.1 10E3/UL (ref 4–11)

## 2024-12-17 RX ORDER — LOSARTAN POTASSIUM 100 MG/1
100 TABLET ORAL DAILY
Qty: 30 TABLET | Refills: 0 | Status: SHIPPED | OUTPATIENT
Start: 2024-12-17

## 2024-12-17 SDOH — HEALTH STABILITY: PHYSICAL HEALTH: ON AVERAGE, HOW MANY DAYS PER WEEK DO YOU ENGAGE IN MODERATE TO STRENUOUS EXERCISE (LIKE A BRISK WALK)?: 5 DAYS

## 2024-12-17 SDOH — HEALTH STABILITY: PHYSICAL HEALTH: ON AVERAGE, HOW MANY MINUTES DO YOU ENGAGE IN EXERCISE AT THIS LEVEL?: 40 MIN

## 2024-12-17 ASSESSMENT — SOCIAL DETERMINANTS OF HEALTH (SDOH): HOW OFTEN DO YOU GET TOGETHER WITH FRIENDS OR RELATIVES?: ONCE A WEEK

## 2024-12-17 ASSESSMENT — PAIN SCALES - GENERAL: PAINLEVEL_OUTOF10: NO PAIN (0)

## 2024-12-17 NOTE — PROGRESS NOTES
Preventive Care Visit  [facility]  Liliana Braga MD, Family Medicine  Dec 17, 2024      Assessment & Plan     Routine history and physical examination of adult  Due for routine fasting labs  Declines immunizations  Agrees to annual PSA, declines JENNIFER  Declines HIV and Hep C screening  Tobacco cessation counseling done today, he smokes a few cigarettes a day.  Does not qualify for low dose CT scan of chest to screen for lung cancer  Chronic conditions reviewed  VSS and PE reassuring  RTC in 3 months for BP check      Benign essential hypertension  BP is above goal.  140/96 on my repeat.  It was actually borderline at the last appointment  We will increase losartan to 100 mg daily  Continue home BP monitoring  Recommend DASH diet  Encourage lifestyle and dietary changes to promote weight loss  RTC In 3 months for BP check  - losartan (COZAAR) 100 MG tablet; Take 1 tablet (100 mg) by mouth daily.  - CBC with platelets and differential; Future  - Comprehensive metabolic panel; Future  - Hemoglobin A1c; Future  - Lipid Profile (Chol, Trig, HDL, LDL calc); Future  - Albumin Random Urine Quantitative with Creat Ratio; Future    Smoker  Social smoker.  Significantly less than 20 pack years.  Does not qualify for low dose CT scan  Encourage smoking cessation  He will try on his won    NAVIN on CPAP  Compliant with CPAP    Class 1 obesity due to excess calories with serious comorbidity and body mass index (BMI) of 32.0 to 32.9 in adult  BMI of 32.  Encourage weight loss of 15-20 pounds to help lower BP.    Hyperlipidemia LDL goal <100  The 10-year ASCVD risk score (Melania PATEL, et al., 2019) is: 10.7%    Values used to calculate the score:      Age: 49 years      Sex: Male      Is Non- : No      Diabetic: No      Tobacco smoker: Yes      Systolic Blood Pressure: 136 mmHg      Is BP treated: Yes      HDL Cholesterol: 50 mg/dL      Total Cholesterol: 203 mg/dL  Intermediate risk based on labs  from 2023  Due for updated labs today.  As intermediate risk, may benefit from statin therapy    S/P right hemicolectomy  Due for repeat colonoscopy in 2025  His abdominal wound is well healed and he reports no new GI symptoms    Anxiety  Well controlled on zoloft 50 mg daily.  He is tolerating medication well with no reported adverse effects      Nicotine/Tobacco Cessation  He reports that he has been smoking cigarettes. He has been exposed to tobacco smoke. He has never used smokeless tobacco.  Nicotine/Tobacco Cessation Plan  Self help information given to patient      Counseling  Appropriate preventive services were addressed with this patient via screening, questionnaire, or discussion as appropriate for fall prevention, nutrition, physical activity, Tobacco-use cessation, social engagement, weight loss and cognition.  Checklist reviewing preventive services available has been given to the patient.  Reviewed patient's diet, addressing concerns and/or questions.         No follow-ups on file.    Isaias Goyal is a 49 year old, presenting for the following:  Physical        12/17/2024     8:46 AM   Additional Questions   Roomed by Manjula keyes   Accompanied by self         12/17/2024     8:46 AM   Patient Reported Additional Medications   Patient reports taking the following new medications None        Via the Health Maintenance questionnaire, the patient has reported the following services have been completed -Colonscopy: Michele 2023-09-18, this information has not been sent to the abstraction team.    AGUS Chatman presents to clinic today for his annual physical  He is doing well overall  He denies any subjective complaints    Health Care Directive  Patient does not have a Health Care Directive: Discussed advance care planning with patient; however, patient declined at this time.      12/17/2024   General Health   How would you rate your overall physical health? Good   Feel stress (tense, anxious, or  unable to sleep) Only a little      (!) STRESS CONCERN      12/17/2024   Nutrition   Three or more servings of calcium each day? Yes   Diet: Regular (no restrictions)   How many servings of fruit and vegetables per day? (!) 2-3   How many sweetened beverages each day? (!) 2            12/17/2024   Exercise   Days per week of moderate/strenous exercise 5 days   Average minutes spent exercising at this level 40 min            12/17/2024   Social Factors   Frequency of gathering with friends or relatives Once a week   Worry food won't last until get money to buy more No   Food not last or not have enough money for food? No   Do you have housing? (Housing is defined as stable permanent housing and does not include staying ouside in a car, in a tent, in an abandoned building, in an overnight shelter, or couch-surfing.) Yes   Are you worried about losing your housing? No   Lack of transportation? No   Unable to get utilities (heat,electricity)? No            12/17/2024   Fall Risk   Fallen 2 or more times in the past year? No    Trouble with walking or balance? No        Patient-reported          12/17/2024   Dental   Dentist two times every year? Yes            12/17/2024   TB Screening   Were you born outside of the [place]? No              12/17/2024   Substance Use   Alcohol more than 3/day or more than 7/wk No   Do you use any other substances recreationally? No        Social History     Tobacco Use    Smoking status: Some Days     Types: Cigarettes     Passive exposure: Past    Smokeless tobacco: Never    Tobacco comments:     no passive exposure   Vaping Use    Vaping status: Never Used   Substance Use Topics    Alcohol use: No    Drug use: No             12/17/2024   One time HIV Screening   Previous HIV test? No          12/17/2024   STI Screening   New sexual partner(s) since last STI/HIV test? No      ASCVD Risk   The 10-year ASCVD risk score (Melania PATEL, et al., 2019) is: 10.7%    Values used to calculate  "the score:      Age: 49 years      Sex: Male      Is Non- : No      Diabetic: No      Tobacco smoker: Yes      Systolic Blood Pressure: 136 mmHg      Is BP treated: Yes      HDL Cholesterol: 50 mg/dL      Total Cholesterol: 203 mg/dL      Reviewed and updated as needed this visit by Provider                    Past Medical History:   Diagnosis Date    Acute sinusitis, unspecified 10/09/2006    Polyp of ascending colon      Past Surgical History:   Procedure Laterality Date    ADENOIDECTOMY  04/01/2006    COLONOSCOPY N/A 09/14/2023    Procedure: COLONOSCOPY WITH POLYPECTOMY;  Surgeon: Jez Alvarez MD;  Location: [facility]    DISKECTOMY, LUMBAR, ADDTNL SP      FESS  02/01/2010    TONSILLECTOMY  04/01/2006    ventilation tubes, bilateral x 2  04/01/2006         Review of Systems  Constitutional, HEENT, cardiovascular, pulmonary, GI, , musculoskeletal, neuro, skin, endocrine and psych systems are negative, except as otherwise noted.     Objective    Exam  BP (!) 136/92   Pulse 72   Temp 97.6  F (36.4  C) (Tympanic)   Resp 16   Ht 1.702 m (5' 7\")   Wt 94.3 kg (207 lb 14.4 oz)   SpO2 98%   BMI 32.56 kg/m     Estimated body mass index is 32.56 kg/m  as calculated from the following:    Height as of this encounter: 1.702 m (5' 7\").    Weight as of this encounter: 94.3 kg (207 lb 14.4 oz).    Physical Exam  GENERAL: alert and no distress  EYES: Eyes grossly normal to inspection, PERRL and conjunctivae and sclerae normal  HENT: ear canals and TM's normal, nose and mouth without ulcers or lesions  NECK: no adenopathy, no asymmetry, masses, or scars  RESP: lungs clear to auscultation - no rales, rhonchi or wheezes  CV: regular rate and rhythm, normal S1 S2, no S3 or S4, no murmur, click or rub, no peripheral edema  ABDOMEN: soft, nontender, no hepatosplenomegaly, no masses and bowel sounds normal  MS: no gross musculoskeletal defects noted, no edema  SKIN: no suspicious lesions or " anjali  NEURO: Normal strength and tone, mentation intact and speech normal  PSYCH: mentation appears normal, affect normal/bright        Signed Electronically by: Liliana Braga MD

## 2025-01-21 ENCOUNTER — OFFICE VISIT (OUTPATIENT)
Dept: FAMILY MEDICINE | Facility: OTHER | Age: 50
End: 2025-01-21
Attending: STUDENT IN AN ORGANIZED HEALTH CARE EDUCATION/TRAINING PROGRAM
Payer: COMMERCIAL

## 2025-01-21 VITALS
DIASTOLIC BLOOD PRESSURE: 94 MMHG | TEMPERATURE: 97.8 F | OXYGEN SATURATION: 96 % | WEIGHT: 204.5 LBS | SYSTOLIC BLOOD PRESSURE: 138 MMHG | HEIGHT: 67 IN | BODY MASS INDEX: 32.1 KG/M2 | RESPIRATION RATE: 15 BRPM | HEART RATE: 67 BPM

## 2025-01-21 DIAGNOSIS — F41.9 ANXIETY: ICD-10-CM

## 2025-01-21 DIAGNOSIS — I10 BENIGN ESSENTIAL HYPERTENSION: ICD-10-CM

## 2025-01-21 PROCEDURE — 99214 OFFICE O/P EST MOD 30 MIN: CPT | Performed by: STUDENT IN AN ORGANIZED HEALTH CARE EDUCATION/TRAINING PROGRAM

## 2025-01-21 RX ORDER — LOSARTAN POTASSIUM 100 MG/1
100 TABLET ORAL DAILY
Qty: 30 TABLET | Refills: 0 | Status: CANCELLED | OUTPATIENT
Start: 2025-01-21

## 2025-01-21 RX ORDER — LOSARTAN POTASSIUM AND HYDROCHLOROTHIAZIDE 12.5; 1 MG/1; MG/1
1 TABLET ORAL DAILY
Qty: 30 TABLET | Refills: 1 | Status: SHIPPED | OUTPATIENT
Start: 2025-01-21

## 2025-01-21 ASSESSMENT — PAIN SCALES - GENERAL: PAINLEVEL_OUTOF10: NO PAIN (0)

## 2025-01-21 NOTE — PROGRESS NOTES
Assessment & Plan     Anxiety  Reports feeling well overall.  He is on 50 mg of sertraline and tolerating this well  He denies any adverse effects  He is requesting a refill of this  He is currently not in therapy and declines this at this time  He denies any SI/HI/AVH  Follow-up Q3 months for this  - sertraline (ZOLOFT) 50 MG tablet; Take 1 tablet (50 mg) by mouth at bedtime.    Benign essential hypertension  BP is improved from prior but still not optimized  It is down from 170s/100s to 130/90s which is a marked improvement  Recommend adding additional agent for better control  Will add low dose HCTZ- discussed possible side effects including electrolyte derangements and decreased renal function.  Will will check BMP in 1-2 weeks.  Patient will also self monitor  Emphasized DASH diet  Continue daily home BP monitoring and recording  Encourage healthy lifestyle and dietary changes and weight loss. Encourage to lose 10-15 pounds which may help BP control as well  RTC In 2 weeks for BP check  - losartan-hydrochlorothiazide (HYZAAR) 100-12.5 MG tablet; Take 1 tablet by mouth daily.  - Basic metabolic panel; Future      Return in about 4 weeks (around 2/18/2025) for Follow up.    Subjective   Jay Jay is a 49 year old, presenting for the following health issues:  No chief complaint on file.        1/21/2025     4:01 PM   Additional Questions   Roomed by Sariah Cali   Accompanied by self         1/21/2025     4:01 PM   Patient Reported Additional Medications   Patient reports taking the following new medications none       Via the Health Maintenance questionnaire, the patient has reported the following services have been completed -Colonscopy: Claiborne 2023-09-14, this information has not been sent to the abstraction team.  History of Present Illness       Hypertension: He presents for follow up of hypertension.  He does not check blood pressure  regularly outside of the clinic. Outside blood pressures have been over  "140/90. He does not follow a low salt diet.     He eats 2-3 servings of fruits and vegetables daily.He consumes 2 sweetened beverage(s) daily.He exercises with enough effort to increase his heart rate 30 to 60 minutes per day.  He exercises with enough effort to increase his heart rate 5 days per week. He is missing 1 dose(s) of medications per week.  He is not taking prescribed medications regularly due to remembering to take.         Review of Systems  Constitutional, HEENT, cardiovascular, pulmonary, GI, , musculoskeletal, neuro, skin, endocrine and psych systems are negative, except as otherwise noted.      Objective    BP (!) 138/94 (BP Location: Left arm, Patient Position: Sitting, Cuff Size: Adult Large)   Pulse 67   Temp 97.8  F (36.6  C) (Tympanic)   Resp 15   Ht 1.702 m (5' 7\")   Wt 92.8 kg (204 lb 8 oz)   SpO2 96%   BMI 32.03 kg/m    Body mass index is 32.03 kg/m .  Physical Exam   GENERAL: alert and no distress  EYES: Eyes grossly normal to inspection, PERRL and conjunctivae and sclerae normal  HENT: ear canals and TM's normal, nose and mouth without ulcers or lesions  NECK: no adenopathy, no asymmetry, masses, or scars  RESP: lungs clear to auscultation - no rales, rhonchi or wheezes  CV: regular rate and rhythm, normal S1 S2, no S3 or S4, no murmur, click or rub, no peripheral edema  ABDOMEN: soft, nontender, no hepatosplenomegaly, no masses and bowel sounds normal  MS: no gross musculoskeletal defects noted, no edema  SKIN: no suspicious lesions or rashes  NEURO: Normal strength and tone, mentation intact and speech normal  PSYCH: mentation appears normal, affect normal/bright          Signed Electronically by: Liliana Braga MD    "

## 2025-03-21 DIAGNOSIS — I10 BENIGN ESSENTIAL HYPERTENSION: ICD-10-CM

## 2025-03-24 RX ORDER — LOSARTAN POTASSIUM AND HYDROCHLOROTHIAZIDE 12.5; 1 MG/1; MG/1
1 TABLET ORAL DAILY
Qty: 30 TABLET | Refills: 0 | Status: SHIPPED | OUTPATIENT
Start: 2025-03-24

## 2025-03-24 NOTE — TELEPHONE ENCOUNTER
Hyzaar      Last Written Prescription Date:  1/21/25  Last Fill Quantity: 30,   # refills: 1  Last Office Visit: 1/21/25  Future Office visit:       Routing refill request to provider for review/approval because:

## 2025-03-24 NOTE — TELEPHONE ENCOUNTER
Routing refill request to provider for review/approval because:  Angiotensin-II Receptors Failed    Rerun Protocol (3/21/2025 1:08 PM)    Most recent blood pressure under 140/90 in past 12 months        BP Readings from Last 3 Encounters:   01/21/25 (!) 138/94   12/17/24 (!) 136/92   09/13/24 138/84      No data recorded

## 2025-03-28 ENCOUNTER — ANESTHESIA EVENT (OUTPATIENT)
Dept: SURGERY | Facility: HOSPITAL | Age: 50
End: 2025-03-28
Payer: COMMERCIAL

## 2025-03-28 ASSESSMENT — LIFESTYLE VARIABLES: TOBACCO_USE: 1

## 2025-03-28 NOTE — ANESTHESIA PREPROCEDURE EVALUATION
Anesthesia Pre-Procedure Evaluation    Patient: Jay Jay Chatman   MRN: [id number] : 1975        Procedure : Procedure(s):  COLONOSCOPY          Past Medical History:   Diagnosis Date     Acute sinusitis, unspecified 10/09/2006     Polyp of ascending colon       Past Surgical History:   Procedure Laterality Date     ADENOIDECTOMY  2006     COLONOSCOPY N/A 2023    Procedure: COLONOSCOPY WITH POLYPECTOMY;  Surgeon: Jez Alvarez MD;  Location: [facility]     DISKECTOMY, LUMBAR, ADDTNL SP       FESS  2010     TONSILLECTOMY  2006     ventilation tubes, bilateral x 2  2006      No Known Allergies   Social History     Tobacco Use     Smoking status: Some Days     Types: Cigarettes     Passive exposure: Past     Smokeless tobacco: Never     Tobacco comments:     no passive exposure   Substance Use Topics     Alcohol use: No     Comment: rarely      Wt Readings from Last 1 Encounters:   25 92.8 kg (204 lb 8 oz)        Anesthesia Evaluation   Pt has had prior anesthetic. Type: General and MAC.    No history of anesthetic complications       ROS/MED HX  ENT/Pulmonary: Comment: 2023: sleep study - mild NAVIN    (+) sleep apnea, mild, uses CPAP,         allergic rhinitis,     tobacco use, Current use,   patient smoked within 24 hours,                    Neurologic:  - neg neurologic ROS     Cardiovascular:     (+)  hypertension-range: HCTZ, losartan/ Peripheral Vascular Disease (ED)-- Other.   -  - -                                 Previous cardiac testing   Echo: Date: Results:    Stress Test:  Date: 2020 Results:       The nuclear stress test is negative for inducible myocardial ischemia or infarction.     Left ventricular function is normal.     The left ventricular ejection fraction at rest is 72%.  The left ventricular ejection fraction at stress is 68%.     A prior study was conducted on 2013.  This study has no change when compared with the prior study.    ECG Reviewed:  " Date: 9/2023 Results:  HR 70  Sinus rhythm   Possible Left atrial enlargement   Incomplete right bundle branch block   Borderline ECG   No previous ECGs available   Confirmed by MD Venkatesh, [facility] ([id number]) on 9/16/2023 4:31:34 PM     Cath:  Date: Results:      METS/Exercise Tolerance: >4 METS    Hematologic:  - neg hematologic  ROS     Musculoskeletal: Comment: Low back pain  History of spine surgery - neg musculoskeletal ROS     GI/Hepatic: Comment: S/p right hemicolectomy    (+)        bowel prep,            Renal/Genitourinary:  - neg Renal ROS     Endo:     (+)               Obesity,       Psychiatric/Substance Use:     (+) psychiatric history anxiety       Infectious Disease:  - neg infectious disease ROS     Malignancy: Comment: Hx tubular adenoma  (+) Malignancy, History of GI.GI CA  status post Surgery.      Other:  - neg other ROS          Physical Exam    Airway        Mallampati: IV   TM distance: > 3 FB   Neck ROM: full   Mouth opening: > 3 cm    Respiratory Devices and Support         Dental       (+) Modest Abnormalities - crowns, retainers, 1 or 2 missing teeth      Cardiovascular          Rhythm and rate: regular and normal     Pulmonary           breath sounds clear to auscultation       OUTSIDE LABS:  CBC:   Lab Results   Component Value Date    WBC 7.1 12/17/2024    WBC 6.7 12/13/2023    HGB 15.8 12/17/2024    HGB 15.9 12/13/2023    HCT 47.5 12/17/2024    HCT 46.7 12/13/2023     12/17/2024     12/13/2023     BMP:   Lab Results   Component Value Date     12/17/2024     12/13/2023    POTASSIUM 3.9 12/17/2024    POTASSIUM 4.2 12/13/2023    CHLORIDE 102 12/17/2024    CHLORIDE 103 12/13/2023    CO2 29 12/17/2024    CO2 25 12/13/2023    BUN 12.7 12/17/2024    BUN 12.5 12/13/2023    CR 0.97 12/17/2024    CR 0.84 12/13/2023     (H) 12/17/2024     (H) 12/13/2023     COAGS: No results found for: \"PTT\", \"INR\", \"FIBR\"  POC: No results found for: \"BGM\", \"HCG\", " "\"HCGS\"  HEPATIC:   Lab Results   Component Value Date    ALBUMIN 4.3 12/17/2024    PROTTOTAL 7.1 12/17/2024    ALT 28 12/17/2024    AST 24 12/17/2024    ALKPHOS 110 12/17/2024    BILITOTAL 0.6 12/17/2024     OTHER:   Lab Results   Component Value Date    LACT 1.3 02/13/2020    A1C 5.7 (H) 12/17/2024    LAKSHMI 9.2 12/17/2024    PHOS 2.1 (L) 10/23/2023    MAG 1.7 10/23/2023    LIPASE 62 (L) 02/13/2020    TSH 1.21 03/31/2021       Anesthesia Plan    ASA Status:  2    NPO Status:  NPO Appropriate    Anesthesia Type: MAC.     - Reason for MAC: straight local not clinically adequate              Consents    Anesthesia Plan(s) and associated risks, benefits, and realistic alternatives discussed. Questions answered and patient/representative(s) expressed understanding.     - Discussed: Risks, Benefits and Alternatives for BOTH SEDATION and the PROCEDURE were discussed     - Discussed with:  Patient      - Extended Intubation/Ventilatory Support Discussed: No.      - Patient is DNR/DNI Status: No     Use of blood products discussed: Yes.     - Discussed with: Patient.     Postoperative Care            Comments:    Other Comments: Chart reviewed hl - same day surg            JASMYNE Mcdaniel CNP    Clinically Significant Risk Factors Present on Admission                   # Hypertension: Noted on problem list                        "

## 2025-03-31 NOTE — OR NURSING
Patient called back and notes that he is feeling much better  states he has not had a cough for 2 days; denies fever and/or any other symptoms.  Note to anesthesia.

## 2025-03-31 NOTE — OR NURSING
Per anesthesia request called pt to ask about lingering cough he noted last week during PAT call.  No answer. Message left for pt to call back.

## 2025-04-03 ENCOUNTER — HOSPITAL ENCOUNTER (OUTPATIENT)
Facility: HOSPITAL | Age: 50
Discharge: HOME | End: 2025-04-03
Attending: SURGERY | Admitting: SURGERY
Payer: COMMERCIAL

## 2025-04-03 ENCOUNTER — ANESTHESIA (OUTPATIENT)
Dept: SURGERY | Facility: HOSPITAL | Age: 50
End: 2025-04-03
Payer: COMMERCIAL

## 2025-04-03 VITALS
SYSTOLIC BLOOD PRESSURE: 109 MMHG | DIASTOLIC BLOOD PRESSURE: 73 MMHG | TEMPERATURE: 97 F | HEART RATE: 60 BPM | RESPIRATION RATE: 16 BRPM | OXYGEN SATURATION: 95 %

## 2025-04-03 PROCEDURE — 258N000003 HC RX IP 258 OP 636: Performed by: NURSE PRACTITIONER

## 2025-04-03 PROCEDURE — 45380 COLONOSCOPY AND BIOPSY: CPT | Mod: PT | Performed by: SURGERY

## 2025-04-03 PROCEDURE — 88305 TISSUE EXAM BY PATHOLOGIST: CPT | Mod: TC | Performed by: SURGERY

## 2025-04-03 PROCEDURE — 250N000011 HC RX IP 250 OP 636: Performed by: NURSE ANESTHETIST, CERTIFIED REGISTERED

## 2025-04-03 PROCEDURE — 370N000017 HC ANESTHESIA TECHNICAL FEE, PER MIN: Performed by: SURGERY

## 2025-04-03 PROCEDURE — 88305 TISSUE EXAM BY PATHOLOGIST: CPT | Mod: 26 | Performed by: PATHOLOGY

## 2025-04-03 PROCEDURE — 999N000141 HC STATISTIC PRE-PROCEDURE NURSING ASSESSMENT: Performed by: SURGERY

## 2025-04-03 PROCEDURE — 250N000009 HC RX 250: Performed by: NURSE ANESTHETIST, CERTIFIED REGISTERED

## 2025-04-03 PROCEDURE — 360N000075 HC SURGERY LEVEL 2, PER MIN: Performed by: SURGERY

## 2025-04-03 PROCEDURE — 710N000012 HC RECOVERY PHASE 2, PER MINUTE: Performed by: SURGERY

## 2025-04-03 PROCEDURE — 272N000001 HC OR GENERAL SUPPLY STERILE: Performed by: SURGERY

## 2025-04-03 RX ORDER — NALOXONE HYDROCHLORIDE 0.4 MG/ML
0.1 INJECTION, SOLUTION INTRAMUSCULAR; INTRAVENOUS; SUBCUTANEOUS
Status: DISCONTINUED | OUTPATIENT
Start: 2025-04-03 | End: 2025-04-03 | Stop reason: HOSPADM

## 2025-04-03 RX ORDER — OXYCODONE HYDROCHLORIDE 5 MG/1
5 TABLET ORAL
Status: CANCELLED | OUTPATIENT
Start: 2025-04-03

## 2025-04-03 RX ORDER — LIDOCAINE 40 MG/G
CREAM TOPICAL
Status: DISCONTINUED | OUTPATIENT
Start: 2025-04-03 | End: 2025-04-03 | Stop reason: HOSPADM

## 2025-04-03 RX ORDER — LIDOCAINE HYDROCHLORIDE 20 MG/ML
INJECTION, SOLUTION INFILTRATION; PERINEURAL PRN
Status: DISCONTINUED | OUTPATIENT
Start: 2025-04-03 | End: 2025-04-03

## 2025-04-03 RX ORDER — ONDANSETRON 2 MG/ML
4 INJECTION INTRAMUSCULAR; INTRAVENOUS EVERY 30 MIN PRN
Status: DISCONTINUED | OUTPATIENT
Start: 2025-04-03 | End: 2025-04-03 | Stop reason: HOSPADM

## 2025-04-03 RX ORDER — SODIUM CHLORIDE, SODIUM LACTATE, POTASSIUM CHLORIDE, CALCIUM CHLORIDE 600; 310; 30; 20 MG/100ML; MG/100ML; MG/100ML; MG/100ML
INJECTION, SOLUTION INTRAVENOUS CONTINUOUS
Status: DISCONTINUED | OUTPATIENT
Start: 2025-04-03 | End: 2025-04-03 | Stop reason: HOSPADM

## 2025-04-03 RX ORDER — ONDANSETRON 4 MG/1
4 TABLET, ORALLY DISINTEGRATING ORAL EVERY 30 MIN PRN
Status: DISCONTINUED | OUTPATIENT
Start: 2025-04-03 | End: 2025-04-03 | Stop reason: HOSPADM

## 2025-04-03 RX ORDER — DEXAMETHASONE SODIUM PHOSPHATE 10 MG/ML
4 INJECTION, SOLUTION INTRAMUSCULAR; INTRAVENOUS
Status: DISCONTINUED | OUTPATIENT
Start: 2025-04-03 | End: 2025-04-03 | Stop reason: HOSPADM

## 2025-04-03 RX ORDER — PROPOFOL 10 MG/ML
INJECTION, EMULSION INTRAVENOUS PRN
Status: DISCONTINUED | OUTPATIENT
Start: 2025-04-03 | End: 2025-04-03

## 2025-04-03 RX ADMIN — PROPOFOL 50 MG: 10 INJECTION, EMULSION INTRAVENOUS at 08:15

## 2025-04-03 RX ADMIN — SODIUM CHLORIDE, SODIUM LACTATE, POTASSIUM CHLORIDE, AND CALCIUM CHLORIDE: .6; .31; .03; .02 INJECTION, SOLUTION INTRAVENOUS at 08:14

## 2025-04-03 RX ADMIN — LIDOCAINE HYDROCHLORIDE 40 MG: 20 INJECTION, SOLUTION INFILTRATION; PERINEURAL at 08:15

## 2025-04-03 RX ADMIN — PROPOFOL 200 MCG/KG/MIN: 10 INJECTION, EMULSION INTRAVENOUS at 08:16

## 2025-04-03 ASSESSMENT — ACTIVITIES OF DAILY LIVING (ADL)
ADLS_ACUITY_SCORE: 54
ADLS_ACUITY_SCORE: 54

## 2025-04-03 NOTE — H&P
HISTORY AND PHYSICAL - ENDOSCOPY   4/3/2025    Patient : Jay Jay Chatman    Planned Procedures : Colonoscopy    This is a 49 year old male with a need for a colonoscopy for History of Colon Polyps and status post right hemicolectomy .    Past Medical History:  Past Medical History:   Diagnosis Date    Acute sinusitis, unspecified 10/09/2006    Polyp of ascending colon        Past Surgical History:  Past Surgical History:   Procedure Laterality Date    ADENOIDECTOMY  04/01/2006    COLONOSCOPY N/A 09/14/2023    Procedure: COLONOSCOPY WITH POLYPECTOMY;  Surgeon: Jez Alvarez MD;  Location: [facility]    DISKECTOMY, LUMBAR, ADDTNL SP      FESS  02/01/2010    TONSILLECTOMY  04/01/2006    ventilation tubes, bilateral x 2  04/01/2006       Family History History:  Family History   Problem Relation Age of Onset    Diabetes Mother     Cerebrovascular Disease Mother     Heart Disease Mother         stents    Diabetes Father     Other - See Comments Brother 4        myocardial infarction    Heart Disease Brother         MI    Other - See Comments Sister         kidney problems; status post nephrectomy       History of Tobacco Use:  History   Smoking Status    Some Days    Types: Cigarettes   Smokeless Tobacco    Never       Current Medications:  No current outpatient medications on file.       Allergies:  No Known Allergies    ROS:  Pertinent items are noted in HPI.  All other systems are negative.    PHYSICAL EXAM:     Vital signs: /84   Temp 97.6  F (36.4  C) (Tympanic)   Resp 16   SpO2 96%    Weight: [unfilled]   BMI: There is no height or weight on file to calculate BMI.   General: Normal, healthy, cooperative, in no acute distress, alert   Skin: no jaundice   HEENT: PERRLA and EOMI   Neck: supple   Lungs: clear to auscultation   CV: Regular rate and rhythm without murmer   Abdominal: abdomen is soft without significant tenderness, masses, organomegaly or guarding   Extremities: No cyanosis, clubbing or edema noted  bilaterally in Upper and Lower Extremities   Neurological: without deficit    Assessment:   49 year old male with need of a colonoscopy for History of Colon Polyps and status post right hemicolectomy :    Plan:   Will plan on doing a colonoscopy.      The risks, benefits, and alternatives to the planned procedure were fully discussed with the patient and/or the patient's representative(s). The risks of bleeding, infection, death, missing pathology, the need for additional procedures intra-operatively, the possible need for intra-operative consults, the possible need for transfusion therapy, cardiopulmonary compromise, the possible need for additional surgery for a complication were discussed with the patient and/or the patient's representative(s). The patient's and/or patient's representative(s) questions were addressed and answered. Informed consent was obtained from the patient and/or the patient's representative(s). The patient and/or the patient's representative(s) consent to proceed.    Specific risks:  Risks include but are not limited to bleeding, perforation, missing lesions, need for additional procedures, reaction to anesthesia.  All the patients questions were answered.  The patient consents to proceed.  The procedures will be scheduled.

## 2025-04-03 NOTE — ANESTHESIA CARE TRANSFER NOTE
Patient: Jay Jay Chatman    Procedure: Procedure(s):  COLONOSCOPY WITH POLYPECTOMY       Diagnosis: Encounter for screening colonoscopy [Z12.11]  Diagnosis Additional Information: No value filed.    Anesthesia Type:   MAC     Note:    Oropharynx: oropharynx clear of all foreign objects  Level of Consciousness: drowsy  Oxygen Supplementation: room air    Independent Airway: airway patency satisfactory and stable  Dentition: dentition unchanged  Vital Signs Stable: post-procedure vital signs reviewed and stable  Report to RN Given: handoff report given  Patient transferred to: Phase II    Handoff Report: Identifed the Patient, Identified the Reponsible Provider, Reviewed the pertinent medical history, Discussed the surgical course, Reviewed Intra-OP anesthesia mangement and issues during anesthesia, Set expectations for post-procedure period and Allowed opportunity for questions and acknowledgement of understanding  Vitals:  Vitals Value Taken Time   BP     Temp     Pulse     Resp     SpO2 93 % 04/03/25 0829   Vitals shown include unfiled device data.    Electronically Signed By: JASMYNE Latham CRNA  April 3, 2025  8:30 AM

## 2025-04-03 NOTE — ANESTHESIA POSTPROCEDURE EVALUATION
Patient: Jay Jay Chatman    Procedure: Procedure(s):  COLONOSCOPY WITH POLYPECTOMY       Anesthesia Type:  MAC    Note:  Disposition: Outpatient   Postop Pain Control: Uneventful            Sign Out: Well controlled pain   PONV: No   Neuro/Psych: Uneventful            Sign Out: Acceptable/Baseline neuro status   Airway/Respiratory: Uneventful            Sign Out: Acceptable/Baseline resp. status   CV/Hemodynamics: Uneventful            Sign Out: Acceptable CV status; No obvious hypovolemia; No obvious fluid overload   Other NRE: NONE   DID A NON-ROUTINE EVENT OCCUR? No       Last vitals:  Vitals Value Taken Time   /73 04/03/25 0910   Temp 97  F (36.1  C) 04/03/25 0910   Pulse 60 04/03/25 0910   Resp 16 04/03/25 0910   SpO2 95 % 04/03/25 0910       Electronically Signed By: JASMYNE Martin CRNA  April 3, 2025  9:59 AM

## 2025-04-03 NOTE — OP NOTE
REPORT OF OPERATION  DATE OF PROCEDURE: 4/3/2025    PATIENT: Jay Jay Chatman    SURGERY PERFORMED:   Colonoscopy     with biopsy      PREOPERATIVE DIAGNOSIS: History of Colon Polyps and status post right hemicolectomy    POSTOPERATIVE DIAGNOSIS:    Same   Colon polyps, 30 cm   Diverticulosis was identified.   Hemorrhoids  were  identified.    SURGEON: Jez Alvarez MD    ASSISTANTS: None    ANESTHESIA: Monitored Anesthesia Care    COMPLICATIONS: None apparent    TRANSFUSIONS: None     TISSUE TO PATHOLOGY: Polyps from  30 cm were sent to pathology for pathological diagnosis.    FINDINGS: Colon polyps, 30 cm.  Diverticulosis was identified.  Hemorrhoids  were  identified.    INDICATIONS: This is a 49 year old male in need of a colonoscopy for History of Colon Polyps and status post right hemicolectomy .  The patient will be taken to the endoscopy suite for that procedure.    DESCRIPTIONS OF PROCEDURE IN DETAIL: After consent was obtained the patient was taken to the endoscopy suite and placed in the left lateral decubitus position.  The patient was identified and the correct patient was confirmed.  Monitored Anesthesia Care was given.  A time out was performed verifying the correct patient and the correct procedure.  The entire operative team was in agreement.  All necessary equipment and supplies were in the room.    Rectal exam was performed and no lesions of the anal canal were noted.  The colonoscope was inserted into the anus and passed without difficulty to the anastomosis of the small bowel to the colon.  Upon withdrawal all walls of the colon were visualized.  Polyps were identified at  30 cm.  These were removed by cold biopsy forceps.  Tattooing their of the location was not done.  Large colon masses and colitis were not seen.colon  Diverticulosis was seen.  Upon reaching the rectum the scope was retroflexed and internal hemorrhoids  were  seen.  The scope was straightened back out and removed from the  patient.  The patient was then taken to the recovery room in stable condition tolerating the procedure well.      Prep: fair    Withdrawal time was 5 minutes.    It is recommended that the patient have another colonoscopy in 3-5 years.

## 2025-04-03 NOTE — OR NURSING
Patient and responsible adult given discharge instructions with no questions regarding instructions. Letitia score 20/20. Pain level 0/10.  Discharged from unit via ambulation. Patient discharged to home with staff.

## 2025-04-22 ENCOUNTER — TELEPHONE (OUTPATIENT)
Dept: FAMILY MEDICINE | Facility: OTHER | Age: 50
End: 2025-04-22

## 2025-04-22 DIAGNOSIS — I10 BENIGN ESSENTIAL HYPERTENSION: ICD-10-CM

## 2025-04-22 NOTE — TELEPHONE ENCOUNTER
losartan-hydrochlorothiazide (HYZAAR) 100-12.5 MG tablet       Last Written Prescription Date:  3/24/25  Last Fill Quantity: 30,   # refills: 0  Last Office Visit: 1/21/25  Future Office visit:       Routing refill request to provider for review/approval because:

## 2025-04-22 NOTE — TELEPHONE ENCOUNTER
Attempt # 1  Outcome: Left Message   Comment: LVM for patient to call to schedule a med review-was due for a 1 mo follow-up after starting medication with Dr. Braga.

## 2025-04-23 NOTE — TELEPHONE ENCOUNTER
Attempt # 2  Outcome: Left Message   Comment: LVM for patient to call to schedule a med review with Dr. Braga.

## 2025-04-25 NOTE — TELEPHONE ENCOUNTER
Attempt # 3  Outcome: Left Message   Comment: LVM for patient to call to schedule a med review/follow-up with Dr. Braga.  Sending a letter.

## 2025-04-25 NOTE — TELEPHONE ENCOUNTER
Patient spouse called clinic to schedule medication review. Patient is scheduled for 06- with Dr. Braga. Will need a refill until patient can be seen.

## 2025-04-28 RX ORDER — LOSARTAN POTASSIUM AND HYDROCHLOROTHIAZIDE 12.5; 1 MG/1; MG/1
1 TABLET ORAL DAILY
Qty: 30 TABLET | Refills: 0 | Status: SHIPPED | OUTPATIENT
Start: 2025-04-28

## 2025-06-02 ENCOUNTER — OFFICE VISIT (OUTPATIENT)
Dept: FAMILY MEDICINE | Facility: OTHER | Age: 50
End: 2025-06-02
Attending: STUDENT IN AN ORGANIZED HEALTH CARE EDUCATION/TRAINING PROGRAM
Payer: COMMERCIAL

## 2025-06-02 ENCOUNTER — RESULTS FOLLOW-UP (OUTPATIENT)
Dept: FAMILY MEDICINE | Facility: OTHER | Age: 50
End: 2025-06-02

## 2025-06-02 VITALS
RESPIRATION RATE: 16 BRPM | HEIGHT: 67 IN | HEART RATE: 63 BPM | WEIGHT: 203.5 LBS | BODY MASS INDEX: 31.94 KG/M2 | DIASTOLIC BLOOD PRESSURE: 74 MMHG | OXYGEN SATURATION: 97 % | TEMPERATURE: 97.5 F | SYSTOLIC BLOOD PRESSURE: 122 MMHG

## 2025-06-02 DIAGNOSIS — F17.200 SMOKER: Primary | ICD-10-CM

## 2025-06-02 DIAGNOSIS — E78.5 HYPERLIPIDEMIA LDL GOAL <100: ICD-10-CM

## 2025-06-02 DIAGNOSIS — F41.9 ANXIETY: ICD-10-CM

## 2025-06-02 DIAGNOSIS — G47.33 OSA ON CPAP: ICD-10-CM

## 2025-06-02 DIAGNOSIS — I10 BENIGN ESSENTIAL HYPERTENSION: ICD-10-CM

## 2025-06-02 LAB
ANION GAP SERPL CALCULATED.3IONS-SCNC: 12 MMOL/L (ref 7–15)
BUN SERPL-MCNC: 14.5 MG/DL (ref 6–20)
CALCIUM SERPL-MCNC: 9.2 MG/DL (ref 8.8–10.4)
CHLORIDE SERPL-SCNC: 105 MMOL/L (ref 98–107)
CREAT SERPL-MCNC: 0.89 MG/DL (ref 0.67–1.17)
EGFRCR SERPLBLD CKD-EPI 2021: >90 ML/MIN/1.73M2
GLUCOSE SERPL-MCNC: 157 MG/DL (ref 70–99)
HCO3 SERPL-SCNC: 24 MMOL/L (ref 22–29)
POTASSIUM SERPL-SCNC: 3.9 MMOL/L (ref 3.4–5.3)
SODIUM SERPL-SCNC: 141 MMOL/L (ref 135–145)

## 2025-06-02 RX ORDER — LOSARTAN POTASSIUM AND HYDROCHLOROTHIAZIDE 12.5; 1 MG/1; MG/1
1 TABLET ORAL DAILY
Qty: 90 TABLET | Refills: 1 | Status: SHIPPED | OUTPATIENT
Start: 2025-06-02

## 2025-06-02 ASSESSMENT — PAIN SCALES - GENERAL: PAINLEVEL_OUTOF10: NO PAIN (0)

## 2025-06-02 NOTE — PROGRESS NOTES
"  Assessment & Plan     Benign essential hypertension  BP in target range  Emphasized DASH diet  Emphasized physical activity and weight loss  Continue home BP monitoring  Continue hyzaar 100.12.5    Smoker  At 1/4 PPD.  Encourage smoking cessation    NAVIN on CPAP  Compliant with CPAP, sleep is good.    Hyperlipidemia LDL goal <100  The 10-year ASCVD risk score (Melania PATEL, et al., 2019) is: 9%    Values used to calculate the score:      Age: 49 years      Sex: Male      Is Non- : No      Diabetic: No      Tobacco smoker: Yes      Systolic Blood Pressure: 122 mmHg      Is BP treated: Yes      HDL Cholesterol: 46 mg/dL      Total Cholesterol: 192 mg/dL    Anxiety  On Zoloft 50 mg and tolerating this well  No panic attacks  Continue  Does not have a therapist and does not want one      BMI  Estimated body mass index is 31.87 kg/m  as calculated from the following:    Height as of this encounter: 1.702 m (5' 7\").    Weight as of this encounter: 92.3 kg (203 lb 8 oz).   Weight management plan: Discussed healthy diet and exercise guidelines        Isaias Goyal is a 49 year old, presenting for the following health issues:  Hypertension and Medication Follow-up        6/2/2025     8:50 AM   Additional Questions   Roomed by Manjula keyes   Accompanied by Spouse         6/2/2025     8:50 AM   Patient Reported Additional Medications   Patient reports taking the following new medications None     History of Present Illness       Hypertension: He presents for follow up of hypertension.  He does not check blood pressure  regularly outside of the clinic. Outside blood pressures have been over 140/90. He does not follow a low salt diet.     He eats 2-3 servings of fruits and vegetables daily.He consumes 1 sweetened beverage(s) daily.He exercises with enough effort to increase his heart rate 30 to 60 minutes per day.  He exercises with enough effort to increase his heart rate 6 days per week.   He is taking " "medications regularly.        Hypertension Follow-up    Do you check your blood pressure regularly outside of the clinic? No   Are you following a low salt diet? No  Are your blood pressures ever more than 140 on the top number (systolic) OR more   than 90 on the bottom number (diastolic), for example 140/90? Yes    BP Readings from Last 2 Encounters:   06/02/25 122/74   04/03/25 109/73         Review of Systems  Constitutional, HEENT, cardiovascular, pulmonary, GI, , musculoskeletal, neuro, skin, endocrine and psych systems are negative, except as otherwise noted.      Objective    /74 (BP Location: Left arm, Patient Position: Sitting, Cuff Size: Adult Large)   Pulse 63   Temp 97.5  F (36.4  C) (Tympanic)   Resp 16   Ht 1.702 m (5' 7\")   Wt 92.3 kg (203 lb 8 oz)   SpO2 97%   BMI 31.87 kg/m    Body mass index is 31.87 kg/m .  Physical Exam   GENERAL: alert and no distress  EYES: Eyes grossly normal to inspection, PERRL and conjunctivae and sclerae normal  HENT: ear canals and TM's normal, nose and mouth without ulcers or lesions  NECK: no adenopathy, no asymmetry, masses, or scars  RESP: lungs clear to auscultation - no rales, rhonchi or wheezes  CV: regular rate and rhythm, normal S1 S2, no S3 or S4, no murmur, click or rub, no peripheral edema  ABDOMEN: soft, nontender, no hepatosplenomegaly, no masses and bowel sounds normal  MS: no gross musculoskeletal defects noted, no edema  SKIN: no suspicious lesions or rashes  NEURO: Normal strength and tone, mentation intact and speech normal  PSYCH: mentation appears normal, affect normal/bright            Signed Electronically by: Liliana Braga MD    "

## 2025-07-25 PROBLEM — D12.6 COLON ADENOMAS: Status: ACTIVE | Noted: 2025-07-25

## (undated) DEVICE — SOL WATER IRRIG 1000ML BOTTLE 2F7114

## (undated) DEVICE — PREP CHLORAPREP 26ML TINTED HI-LITE ORANGE 930815

## (undated) DEVICE — SUCTION MANIFOLD NEPTUNE 2 SYS 1 PORT 702-025-000

## (undated) DEVICE — PACK BASIN SET UP SUTCNBSBBA

## (undated) DEVICE — FORCEPS BIOPSY RADIAL JAW 4 LARGE W/NEEDLE 240CM M00513332

## (undated) DEVICE — DRESSING MEPILEX AG SILVER 4X8 395890

## (undated) DEVICE — CANISTER SUCTION MEDI-VAC GUARDIAN 2000ML 90D 65651-220

## (undated) DEVICE — STPL SKIN 35W 6.9MM  PXW35

## (undated) DEVICE — GLOVE 8.5 PROTEXIS PI CLSC PF BD CUF STRL LF 12IN 2D72PL85X

## (undated) DEVICE — SLEEVE SCD EXPRESS KNEE LENGTH MED 9529

## (undated) DEVICE — SU SILK 3-0 SH CR 8X18" C013D

## (undated) DEVICE — TUBING SUCTION 20FT N620A

## (undated) DEVICE — CONNECTOR ERBEFLO 2 PORT 20325-215

## (undated) DEVICE — DRESSING MEPILEX BORDER AG 3" X 3" (7.5CM X 7.5CM) 395290

## (undated) DEVICE — SYSTEM CLEARIFY VISUALIZATION 21-345

## (undated) DEVICE — ESU GROUND PAD ADULT W/CORD E7507

## (undated) DEVICE — TUBING INSUFFLATION INSUFFLATOR FILTER HIGH FLOW 031322-01

## (undated) DEVICE — CATH TRAY 16FR METER W/STATLOCK LATEX] A902916

## (undated) DEVICE — SOL NACL 0.9% IRRIG 1000ML BOTTLE 2F7124

## (undated) DEVICE — COVER LT HANDLE 2/PK 5160-2FG

## (undated) DEVICE — BLANKET BAIR HUGGER UPPER BODY 42268

## (undated) DEVICE — SU VICRYL 0 UR-6 27" J603H

## (undated) DEVICE — ENDO TROCAR FIRST ENTRY KII FIOS ADV FIX 05X100MM CFF03

## (undated) DEVICE — SU MONOCRYL 4-0 PS-2 18" UND Y496G

## (undated) DEVICE — ESU LIGASURE MARYLAND LAPAROSCOPIC SLR/DVDR 5MMX37CM LF1937

## (undated) DEVICE — ENDO GELPORT 100/120MM C8XX2

## (undated) DEVICE — DRSG STERI STRIP 1/2X4" R1547

## (undated) DEVICE — ENDO TROCAR SLEEVE KII ADV FIXATION 05X100MM CFS02

## (undated) DEVICE — STPL LINEAR CUT 75MM TLC75

## (undated) DEVICE — APPLICATOR BNZN TNCT RYN SWBSTK NWVN SNGL APLS1106

## (undated) DEVICE — EVAC SYSTEM CLEAR FLOW SC082500

## (undated) DEVICE — SU VICRYL 3-0 SH 27" UND J416H

## (undated) DEVICE — SUCTION STRYKERFLOW II 250-070-500

## (undated) DEVICE — DRSG GAUZE 4X4" 3033

## (undated) DEVICE — LABEL STERILE PREPRINTED FOR OR FRRH01-2M

## (undated) DEVICE — STPL RELOAD LINEAR CUT 75MM TCR75

## (undated) DEVICE — EYE PREP BETADINE 5% SOLUTION 30ML 0065-0411-30

## (undated) DEVICE — PACK LAPAROSCOPY CUSTOM SBACNLSMBF

## (undated) DEVICE — SU PDS II 1 TP-1 48" Z880G

## (undated) DEVICE — STPL LINEAR CUT 60X3.5MM TX60B

## (undated) RX ORDER — FENTANYL CITRATE 50 UG/ML
INJECTION, SOLUTION INTRAMUSCULAR; INTRAVENOUS
Status: DISPENSED
Start: 2023-10-16

## (undated) RX ORDER — EPHEDRINE SULFATE 50 MG/ML
INJECTION, SOLUTION INTRAMUSCULAR; INTRAVENOUS; SUBCUTANEOUS
Status: DISPENSED
Start: 2023-10-16

## (undated) RX ORDER — DEXAMETHASONE SODIUM PHOSPHATE 10 MG/ML
INJECTION, SOLUTION INTRAMUSCULAR; INTRAVENOUS
Status: DISPENSED
Start: 2023-10-16

## (undated) RX ORDER — FENTANYL CITRATE-0.9 % NACL/PF 10 MCG/ML
PLASTIC BAG, INJECTION (ML) INTRAVENOUS
Status: DISPENSED
Start: 2023-10-16

## (undated) RX ORDER — PROPOFOL 10 MG/ML
INJECTION, EMULSION INTRAVENOUS
Status: DISPENSED
Start: 2023-10-10

## (undated) RX ORDER — ONDANSETRON 2 MG/ML
INJECTION INTRAMUSCULAR; INTRAVENOUS
Status: DISPENSED
Start: 2023-10-16

## (undated) RX ORDER — PROPOFOL 10 MG/ML
INJECTION, EMULSION INTRAVENOUS
Status: DISPENSED
Start: 2025-04-03

## (undated) RX ORDER — PROPOFOL 10 MG/ML
INJECTION, EMULSION INTRAVENOUS
Status: DISPENSED
Start: 2023-09-14

## (undated) RX ORDER — PROPOFOL 10 MG/ML
INJECTION, EMULSION INTRAVENOUS
Status: DISPENSED
Start: 2023-10-16